# Patient Record
Sex: FEMALE | Race: WHITE | NOT HISPANIC OR LATINO | URBAN - NONMETROPOLITAN AREA
[De-identification: names, ages, dates, MRNs, and addresses within clinical notes are randomized per-mention and may not be internally consistent; named-entity substitution may affect disease eponyms.]

---

## 2017-01-04 ENCOUNTER — OFFICE VISIT (OUTPATIENT)
Dept: FAMILY MEDICINE CLINIC | Facility: CLINIC | Age: 57
End: 2017-01-04

## 2017-01-04 VITALS
BODY MASS INDEX: 36.02 KG/M2 | HEART RATE: 76 BPM | HEIGHT: 64 IN | WEIGHT: 211 LBS | DIASTOLIC BLOOD PRESSURE: 75 MMHG | OXYGEN SATURATION: 97 % | TEMPERATURE: 99 F | SYSTOLIC BLOOD PRESSURE: 115 MMHG

## 2017-01-04 DIAGNOSIS — M25.552 LEFT HIP PAIN: Primary | ICD-10-CM

## 2017-01-04 PROCEDURE — 99213 OFFICE O/P EST LOW 20 MIN: CPT | Performed by: NURSE PRACTITIONER

## 2017-01-04 PROCEDURE — 96372 THER/PROPH/DIAG INJ SC/IM: CPT | Performed by: NURSE PRACTITIONER

## 2017-01-04 RX ORDER — KETOROLAC TROMETHAMINE 30 MG/ML
60 INJECTION, SOLUTION INTRAMUSCULAR; INTRAVENOUS ONCE
Status: COMPLETED | OUTPATIENT
Start: 2017-01-04 | End: 2017-01-04

## 2017-01-04 RX ORDER — DEXAMETHASONE SODIUM PHOSPHATE 4 MG/ML
8 INJECTION, SOLUTION INTRA-ARTICULAR; INTRALESIONAL; INTRAMUSCULAR; INTRAVENOUS; SOFT TISSUE ONCE
Status: COMPLETED | OUTPATIENT
Start: 2017-01-04 | End: 2017-01-04

## 2017-01-04 RX ADMIN — DEXAMETHASONE SODIUM PHOSPHATE 8 MG: 4 INJECTION, SOLUTION INTRA-ARTICULAR; INTRALESIONAL; INTRAMUSCULAR; INTRAVENOUS; SOFT TISSUE at 12:22

## 2017-01-04 RX ADMIN — KETOROLAC TROMETHAMINE 60 MG: 30 INJECTION, SOLUTION INTRAMUSCULAR; INTRAVENOUS at 12:23

## 2017-01-04 NOTE — PROGRESS NOTES
"Alesia Horne is a 56 y.o. female.     Chief Complaint   Patient presents with   • Hip Pain     left        History of Present Illness     Left hip pain-recurrent in nature but no recent exacerbation.  She did get a joint injection per DEVI Coffman that helped with the discomfort.  She is having problems with weight bearing, prolonged sitting, and lying on there side during the night, and negotiating stairs are becoming difficult.  She is unable to take NSAIDS due to gastric bypass surgery.  She has tolerated Toradol in the past.  She has not had any recent injury.  Past Xray positive for arthritis changes.  She reports she did take Zanaflex last night that did help give her some relief.  Not at goal.     The following portions of the patient's history were reviewed and updated as appropriate: allergies, current medications, past family history, past medical history, past social history, past surgical history and problem list.    Review of Systems   Constitutional: Negative for appetite change and unexpected weight change.   Respiratory: Negative for cough and shortness of breath.    Cardiovascular: Negative for chest pain.   Gastrointestinal:        No changes in usual habits   Genitourinary:        No changes in urinary habits   Musculoskeletal: Positive for arthralgias (left hip.  but reports right shoulder, hands and elbow with generalized achiness). Negative for gait problem.   Neurological: Positive for numbness.   Psychiatric/Behavioral: Positive for sleep disturbance (due to discomfort).   All other systems reviewed and are negative.      Objective     Visit Vitals   • /75 (BP Location: Left arm, Patient Position: Sitting)   • Pulse 76   • Temp 99 °F (37.2 °C) (Tympanic)   • Ht 64\" (162.6 cm)   • Wt 211 lb (95.7 kg)   • SpO2 97%   • BMI 36.22 kg/m2       Physical Exam   Constitutional: She is oriented to person, place, and time. She appears well-developed and well-nourished.   HENT:   Head: " Normocephalic and atraumatic.   Nose: Nose normal.   Mouth/Throat: Oropharynx is clear and moist.   Eyes: Conjunctivae and EOM are normal. Pupils are equal, round, and reactive to light.   Neck: Normal range of motion. Neck supple. No tracheal deviation present. No thyromegaly present.   Cardiovascular: Normal rate, regular rhythm, normal heart sounds and intact distal pulses.    No murmur heard.  Pulmonary/Chest: Effort normal and breath sounds normal. No respiratory distress. She has no wheezes.   Abdominal: Soft. Bowel sounds are normal. There is no tenderness. There is no guarding.   Musculoskeletal: She exhibits no edema.        Left hip: She exhibits decreased range of motion and tenderness.   Lymphadenopathy:     She has no cervical adenopathy.   Neurological: She is alert and oriented to person, place, and time.   Skin: Skin is warm and dry. No rash noted.   Psychiatric: She has a normal mood and affect. Her behavior is normal.   Nursing note and vitals reviewed.      Assessment/Plan     Jennifer was seen today for hip pain.    Diagnoses and all orders for this visit:    Left hip pain  -     diclofenac (FLECTOR) 1.3 % patch patch; Apply 1 patch topically 2 (Two) Times a Day.  -     ketorolac (TORADOL) injection 60 mg; Inject 60 mg into the shoulder, thigh, or buttocks 1 (One) Time.  -     dexamethasone (DECADRON) injection 8 mg; Inject 2 mL into the shoulder, thigh, or buttocks 1 (One) Time.    Injections today for acute pain relief.  Will arrange for large joint injection with DEVI Coffman.    Understands disease processes and need for medications.  Understands reasons for urgent and emergent care.  Patient (& family) verbalized agreement for treatment plan.   No NSAIDS due to surgery.   RTC PRN 3-5 days for worsening or non resolving symptoms

## 2017-01-04 NOTE — PROGRESS NOTES
"Subjective   Jennifer Horne is a 56 y.o. female.     Chief Complaint   Patient presents with   • Hip Pain     left        History of Present Illness     {Common H&P Review Areas:76559}    Review of Systems    Objective     Visit Vitals   • /75 (BP Location: Left arm, Patient Position: Sitting)   • Pulse 76   • Temp 99 °F (37.2 °C) (Tympanic)   • Ht 64\" (162.6 cm)   • Wt 211 lb (95.7 kg)   • SpO2 97%   • BMI 36.22 kg/m2       Physical Exam    Assessment/Plan     {Assess/PlanSmartLinks:89419}           "

## 2017-01-10 ENCOUNTER — OFFICE VISIT (OUTPATIENT)
Dept: FAMILY MEDICINE CLINIC | Facility: CLINIC | Age: 57
End: 2017-01-10

## 2017-01-10 VITALS
WEIGHT: 210 LBS | HEART RATE: 98 BPM | SYSTOLIC BLOOD PRESSURE: 110 MMHG | DIASTOLIC BLOOD PRESSURE: 60 MMHG | TEMPERATURE: 99.6 F | BODY MASS INDEX: 35.85 KG/M2 | HEIGHT: 64 IN | OXYGEN SATURATION: 98 %

## 2017-01-10 DIAGNOSIS — M70.72 HIP BURSITIS, LEFT: Primary | ICD-10-CM

## 2017-01-10 PROCEDURE — 20610 DRAIN/INJ JOINT/BURSA W/O US: CPT | Performed by: PHYSICIAN ASSISTANT

## 2017-01-10 PROCEDURE — 99213 OFFICE O/P EST LOW 20 MIN: CPT | Performed by: PHYSICIAN ASSISTANT

## 2017-01-10 NOTE — MR AVS SNAPSHOT
Jennifer Horne   1/10/2017 1:00 PM   Office Visit    Dept Phone:  [phone]   Encounter #:  [id number]    Provider:  MAIK An   Department:  [facility]                Your Full Care Plan              Your Updated Medication List          This list is accurate as of: 1/10/17  1:44 PM.  Always use your most recent med list.                amoxicillin-clavulanate 875-125 MG per tablet   Commonly known as:  AUGMENTIN   Take 1 tablet by mouth 2 (two) times a day.       diclofenac 1 % gel gel   Commonly known as:  VOLTAREN       diclofenac 1.3 % patch patch   Commonly known as:  FLECTOR   Apply 1 patch topically 2 (Two) Times a Day.       levocetirizine 5 MG tablet   Commonly known as:  XYZAL   Take 1 tablet by mouth every evening.       pantoprazole 40 MG EC tablet   Commonly known as:  PROTONIX   TAKE ONE TABLET BY MOUTH DAILY       tiZANidine 4 MG tablet   Commonly known as:  ZANAFLEX   Take 1 tablet by mouth at night as needed for muscle spasms.               Instructions     None    Patient Instructions History      Upcoming Appointments     Visit Type Date Time Department    OFFICE VISIT 1/10/2017  1:00 PM [facility]    OFFICE VISIT 2/3/2017  9:00 AM [facility]      MyChart Signup     Our records indicate that you have declined [facility] sign[facility]. If you would like to sign up for [facility], please email [email or web address] or call [phone] to obtain an activation code.             Other Info from Your Visit           Your Appointments     Feb 03, 2017  9:00 AM EST   Office Visit with MAIK An   [facility] (--)    [address] [place] [id number] [phone]           Please arrive 10 minutes early, bring a complete list of all medications and bring any previous records or diagnostic testing results.              Allergies     Compazine  "[Prochlorperazine]  Myalgia      Vital Signs     Blood Pressure Pulse Temperature Height Weight Oxygen Saturation    110/60 98 99.6 °F (37.6 °C) (Tympanic) 64\" (162.6 cm) 210 lb (95.3 kg) 98%    Body Mass Index Smoking Status                36.05 kg/m2 Former Smoker            "

## 2017-01-10 NOTE — PROGRESS NOTES
Subjective   Jennifer Horne is a 56 y.o. female.     History of Present Illness     Left hip pain-  She is here today with complaints of left hip pain that began 6 days ago.  Minimal relief with Toradol injection last week.  Pain is described as moderate and achy for the past month.  Worse with going up or down stairs and with getting in or out of a car.    The following portions of the patient's history were reviewed and updated as appropriate: allergies, current medications, past family history, past medical history, past social history, past surgical history and problem list.    Review of Systems   Constitutional: Negative for activity change, appetite change and fever.   HENT: Negative for ear pain, sinus pressure and sore throat.    Eyes: Negative for pain and visual disturbance.   Respiratory: Negative for cough and chest tightness.    Cardiovascular: Negative for chest pain and palpitations.   Gastrointestinal: Negative for abdominal pain, constipation, diarrhea, nausea and vomiting.   Endocrine: Negative for polydipsia and polyuria.   Genitourinary: Negative for dysuria and frequency.   Musculoskeletal: Positive for arthralgias (left hip pain). Negative for back pain and myalgias.   Skin: Negative for color change and rash.   Allergic/Immunologic: Negative for food allergies and immunocompromised state.   Neurological: Negative for dizziness, syncope and headaches.   Hematological: Negative for adenopathy. Does not bruise/bleed easily.   Psychiatric/Behavioral: Negative for hallucinations and suicidal ideas. The patient is not nervous/anxious.        Objective   Physical Exam   Constitutional: She is oriented to person, place, and time. She appears well-developed and well-nourished.   Cardiovascular: Normal rate, regular rhythm and normal heart sounds.    No murmur heard.  Pulmonary/Chest: Effort normal and breath sounds normal. She has no wheezes.   Musculoskeletal: She exhibits tenderness.   Left hip tender  laterally.  Decreased range of joint motion in left hip with abduction flexion and extension.   Neurological: She is alert and oriented to person, place, and time.   Skin: Skin is warm and dry.   Psychiatric: She has a normal mood and affect. Her behavior is normal.   Nursing note and vitals reviewed.      Assessment/Plan   Diagnoses and all orders for this visit:    Hip bursitis, left     procedure: Left hip injection/large joint injection  The procedure risks and benefits were explained to patient she gave verbal consent to have the procedure performed.    Indication: Left hip bursitis  Provider: Aisha Coffman PA-C  Description: The patient was placed on her right side.  An injection was given into the left hip joint using 2 cc of 1% lidocaine and 1 cc of Kenalog.  Pressure was held and a sterile dressing was placed.  No complications  Estimated blood loss: Minimal  Patient tolerated the procedure well.    1% lidocaine 200 mg per 20 mL NDC #0409-427 6-1 6  Kenalog 400 mg per 10 mL NDC #0003-029 3-2 8

## 2017-02-03 ENCOUNTER — OFFICE VISIT (OUTPATIENT)
Dept: FAMILY MEDICINE CLINIC | Facility: CLINIC | Age: 57
End: 2017-02-03

## 2017-02-03 VITALS
BODY MASS INDEX: 35.68 KG/M2 | OXYGEN SATURATION: 98 % | DIASTOLIC BLOOD PRESSURE: 82 MMHG | WEIGHT: 209 LBS | SYSTOLIC BLOOD PRESSURE: 138 MMHG | TEMPERATURE: 98.4 F | HEART RATE: 74 BPM | HEIGHT: 64 IN

## 2017-02-03 DIAGNOSIS — E55.9 VITAMIN D DEFICIENCY: ICD-10-CM

## 2017-02-03 DIAGNOSIS — M15.9 PRIMARY OSTEOARTHRITIS INVOLVING MULTIPLE JOINTS: Primary | ICD-10-CM

## 2017-02-03 DIAGNOSIS — K21.9 GERD WITHOUT ESOPHAGITIS: ICD-10-CM

## 2017-02-03 DIAGNOSIS — J30.1 NON-SEASONAL ALLERGIC RHINITIS DUE TO POLLEN: ICD-10-CM

## 2017-02-03 DIAGNOSIS — Z11.59 NEED FOR HEPATITIS C SCREENING TEST: ICD-10-CM

## 2017-02-03 PROCEDURE — 99214 OFFICE O/P EST MOD 30 MIN: CPT | Performed by: PHYSICIAN ASSISTANT

## 2017-02-03 PROCEDURE — 90715 TDAP VACCINE 7 YRS/> IM: CPT | Performed by: PHYSICIAN ASSISTANT

## 2017-02-03 PROCEDURE — 90471 IMMUNIZATION ADMIN: CPT | Performed by: PHYSICIAN ASSISTANT

## 2017-02-03 RX ORDER — PANTOPRAZOLE SODIUM 40 MG/1
40 TABLET, DELAYED RELEASE ORAL DAILY
Qty: 90 TABLET | Refills: 3 | Status: SHIPPED | OUTPATIENT
Start: 2017-02-03 | End: 2017-08-18 | Stop reason: SDUPTHER

## 2017-02-03 NOTE — PROGRESS NOTES
Subjective   Jennifer Horne is a 57 y.o. female.     History of Present Illness     Left hip pain-  She complains of intermittent moderate achy left hip pain that is worse when going up and down stairs.  Some relief with Kenalog injection in the recent past.  Minimal relief with Voltaren gel.  Onset was over a year ago.  No known injury.  X-rays have confirmed osteoarthritis in the left hip in 2016.  Not at goal    GERD-stable with the use of pantoprazole    Allergic rhinitis-chronic and stable with his eyes all daily    The following portions of the patient's history were reviewed and updated as appropriate: allergies, current medications, past family history, past medical history, past social history, past surgical history and problem list.    Review of Systems   Constitutional: Negative for activity change, appetite change and fever.   HENT: Negative for ear pain, sinus pressure and sore throat.    Eyes: Negative for pain and visual disturbance.   Respiratory: Negative for cough and chest tightness.    Cardiovascular: Negative for chest pain and palpitations.   Gastrointestinal: Negative for abdominal pain, constipation, diarrhea, nausea and vomiting.   Endocrine: Negative for polydipsia and polyuria.   Genitourinary: Negative for dysuria and frequency.   Musculoskeletal: Positive for arthralgias. Negative for back pain and myalgias.   Skin: Negative for color change and rash.   Allergic/Immunologic: Negative for food allergies and immunocompromised state.   Neurological: Negative for dizziness, syncope and headaches.   Hematological: Negative for adenopathy. Does not bruise/bleed easily.   Psychiatric/Behavioral: Negative for hallucinations and suicidal ideas. The patient is not nervous/anxious.        Objective   Physical Exam   Constitutional: She is oriented to person, place, and time. She appears well-developed and well-nourished.   HENT:   Head: Normocephalic and atraumatic.   Nose: Nose normal.    Mouth/Throat: Oropharynx is clear and moist.   Eyes: Conjunctivae and EOM are normal. Pupils are equal, round, and reactive to light.   Neck: Normal range of motion. Neck supple. No tracheal deviation present. No thyromegaly present.   Cardiovascular: Normal rate, regular rhythm, normal heart sounds and intact distal pulses.    No murmur heard.  Pulmonary/Chest: Effort normal and breath sounds normal. No respiratory distress. She has no wheezes.   Abdominal: Soft. Bowel sounds are normal. There is no tenderness. There is no guarding.   Musculoskeletal: Normal range of motion. She exhibits no edema or tenderness.   Lymphadenopathy:     She has no cervical adenopathy.   Neurological: She is alert and oriented to person, place, and time.   Skin: Skin is warm and dry. No rash noted.   Psychiatric: She has a normal mood and affect. Her behavior is normal.   Nursing note and vitals reviewed.      Assessment/Plan   Diagnoses and all orders for this visit:    Primary osteoarthritis involving multiple joints  -     Comprehensive Metabolic Panel  -     Lipid Panel    GERD without esophagitis  -     pantoprazole (PROTONIX) 40 MG EC tablet; Take 1 tablet by mouth Daily.  -     CBC & Differential    Non-seasonal allergic rhinitis due to pollen    Need for hepatitis C screening test  -     Hepatitis C Antibody; Future    Vitamin D deficiency  -     Vitamin D 25 Hydroxy    Other orders  -     Tdap Vaccine Greater Than or Equal To 8yo IM     prescription was written for compounded pain therapy including Nabumetone, gabapentin, lidocaine, and prilocaine.

## 2017-05-30 ENCOUNTER — HOSPITAL ENCOUNTER (OUTPATIENT)
Dept: GENERAL RADIOLOGY | Facility: HOSPITAL | Age: 57
Discharge: HOME | End: 2017-05-30
Admitting: PHYSICIAN ASSISTANT

## 2017-05-30 DIAGNOSIS — M79.672 LEFT FOOT PAIN: Primary | ICD-10-CM

## 2017-05-30 DIAGNOSIS — M79.672 LEFT FOOT PAIN: ICD-10-CM

## 2017-05-30 PROCEDURE — 73630 X-RAY EXAM OF FOOT: CPT

## 2017-05-30 PROCEDURE — 73630 X-RAY EXAM OF FOOT: CPT | Performed by: RADIOLOGY

## 2017-05-31 ENCOUNTER — TELEPHONE (OUTPATIENT)
Dept: FAMILY MEDICINE CLINIC | Facility: CLINIC | Age: 57
End: 2017-05-31

## 2017-07-28 ENCOUNTER — LAB (OUTPATIENT)
Dept: FAMILY MEDICINE CLINIC | Facility: CLINIC | Age: 57
End: 2017-07-28

## 2017-07-28 DIAGNOSIS — Z11.59 NEED FOR HEPATITIS C SCREENING TEST: ICD-10-CM

## 2017-07-28 LAB
25(OH)D3 SERPL-MCNC: 45 NG/ML
ALBUMIN SERPL-MCNC: 4.8 G/DL (ref 3.5–5)
ALBUMIN/GLOB SERPL: 1.6 G/DL (ref 1.5–2.5)
ALP SERPL-CCNC: 98 U/L (ref 35–104)
ALT SERPL W P-5'-P-CCNC: 25 U/L (ref 10–36)
ANION GAP SERPL CALCULATED.3IONS-SCNC: 5.5 MMOL/L (ref 3.6–11.2)
AST SERPL-CCNC: 29 U/L (ref 10–30)
BASOPHILS # BLD AUTO: 0.02 10*3/MM3 (ref 0–0.3)
BASOPHILS NFR BLD AUTO: 0.3 % (ref 0–2)
BILIRUB SERPL-MCNC: 0.5 MG/DL (ref 0.2–1.8)
BUN BLD-MCNC: 10 MG/DL (ref 7–21)
BUN/CREAT SERPL: 14.7 (ref 7–25)
CALCIUM SPEC-SCNC: 9.8 MG/DL (ref 7.7–10)
CHLORIDE SERPL-SCNC: 107 MMOL/L (ref 99–112)
CHOLEST SERPL-MCNC: 188 MG/DL (ref 0–200)
CO2 SERPL-SCNC: 32.5 MMOL/L (ref 24.3–31.9)
CREAT BLD-MCNC: 0.68 MG/DL (ref 0.43–1.29)
DEPRECATED RDW RBC AUTO: 41.9 FL (ref 37–54)
EOSINOPHIL # BLD AUTO: 0.12 10*3/MM3 (ref 0–0.7)
EOSINOPHIL NFR BLD AUTO: 2.1 % (ref 0–5)
ERYTHROCYTE [DISTWIDTH] IN BLOOD BY AUTOMATED COUNT: 13.6 % (ref 11.5–14.5)
GFR SERPL CREATININE-BSD FRML MDRD: 90 ML/MIN/1.73
GLOBULIN UR ELPH-MCNC: 3 GM/DL
GLUCOSE BLD-MCNC: 98 MG/DL (ref 70–110)
HCT VFR BLD AUTO: 41.3 % (ref 37–47)
HCV AB SER DONR QL: NORMAL
HDLC SERPL-MCNC: 62 MG/DL (ref 60–100)
HGB BLD-MCNC: 13.1 G/DL (ref 12–16)
IMM GRANULOCYTES # BLD: 0.01 10*3/MM3 (ref 0–0.03)
IMM GRANULOCYTES NFR BLD: 0.2 % (ref 0–0.5)
LDLC SERPL CALC-MCNC: 109 MG/DL (ref 0–100)
LDLC/HDLC SERPL: 1.75 {RATIO}
LYMPHOCYTES # BLD AUTO: 1.43 10*3/MM3 (ref 1–3)
LYMPHOCYTES NFR BLD AUTO: 24.4 % (ref 21–51)
MCH RBC QN AUTO: 27.6 PG (ref 27–33)
MCHC RBC AUTO-ENTMCNC: 31.7 G/DL (ref 33–37)
MCV RBC AUTO: 86.9 FL (ref 80–94)
MONOCYTES # BLD AUTO: 0.48 10*3/MM3 (ref 0.1–0.9)
MONOCYTES NFR BLD AUTO: 8.2 % (ref 0–10)
NEUTROPHILS # BLD AUTO: 3.79 10*3/MM3 (ref 1.4–6.5)
NEUTROPHILS NFR BLD AUTO: 64.8 % (ref 30–70)
OSMOLALITY SERPL CALC.SUM OF ELEC: 287.7 MOSM/KG (ref 273–305)
PLATELET # BLD AUTO: 234 10*3/MM3 (ref 130–400)
PMV BLD AUTO: 11.2 FL (ref 6–10)
POTASSIUM BLD-SCNC: 4.2 MMOL/L (ref 3.5–5.3)
PROT SERPL-MCNC: 7.8 G/DL (ref 6–8)
RBC # BLD AUTO: 4.75 10*6/MM3 (ref 4.2–5.4)
SODIUM BLD-SCNC: 145 MMOL/L (ref 135–153)
TRIGL SERPL-MCNC: 87 MG/DL (ref 0–150)
VLDLC SERPL-MCNC: 17.4 MG/DL
WBC NRBC COR # BLD: 5.85 10*3/MM3 (ref 4.5–12.5)

## 2017-07-28 PROCEDURE — 80053 COMPREHEN METABOLIC PANEL: CPT | Performed by: PHYSICIAN ASSISTANT

## 2017-07-28 PROCEDURE — 82306 VITAMIN D 25 HYDROXY: CPT | Performed by: PHYSICIAN ASSISTANT

## 2017-07-28 PROCEDURE — 86803 HEPATITIS C AB TEST: CPT | Performed by: PHYSICIAN ASSISTANT

## 2017-07-28 PROCEDURE — 80061 LIPID PANEL: CPT | Performed by: PHYSICIAN ASSISTANT

## 2017-07-28 PROCEDURE — 36415 COLL VENOUS BLD VENIPUNCTURE: CPT

## 2017-07-28 PROCEDURE — 85025 COMPLETE CBC W/AUTO DIFF WBC: CPT | Performed by: PHYSICIAN ASSISTANT

## 2017-08-18 ENCOUNTER — OFFICE VISIT (OUTPATIENT)
Dept: FAMILY MEDICINE CLINIC | Facility: CLINIC | Age: 57
End: 2017-08-18

## 2017-08-18 VITALS
DIASTOLIC BLOOD PRESSURE: 78 MMHG | HEIGHT: 64 IN | WEIGHT: 213 LBS | SYSTOLIC BLOOD PRESSURE: 128 MMHG | HEART RATE: 80 BPM | BODY MASS INDEX: 36.37 KG/M2 | OXYGEN SATURATION: 97 % | TEMPERATURE: 98.2 F

## 2017-08-18 DIAGNOSIS — M15.9 PRIMARY OSTEOARTHRITIS INVOLVING MULTIPLE JOINTS: Primary | ICD-10-CM

## 2017-08-18 DIAGNOSIS — M62.838 MUSCLE SPASM OF BOTH LOWER LEGS: ICD-10-CM

## 2017-08-18 DIAGNOSIS — J30.1 NON-SEASONAL ALLERGIC RHINITIS DUE TO POLLEN: ICD-10-CM

## 2017-08-18 DIAGNOSIS — K21.9 GERD WITHOUT ESOPHAGITIS: ICD-10-CM

## 2017-08-18 PROCEDURE — 99214 OFFICE O/P EST MOD 30 MIN: CPT | Performed by: PHYSICIAN ASSISTANT

## 2017-08-18 RX ORDER — PANTOPRAZOLE SODIUM 40 MG/1
40 TABLET, DELAYED RELEASE ORAL DAILY
Qty: 90 TABLET | Refills: 3 | Status: SHIPPED | OUTPATIENT
Start: 2017-08-18 | End: 2018-02-16 | Stop reason: SDUPTHER

## 2017-08-18 RX ORDER — LEVOCETIRIZINE DIHYDROCHLORIDE 5 MG/1
5 TABLET, FILM COATED ORAL EVERY EVENING
Qty: 90 TABLET | Refills: 3 | Status: SHIPPED | OUTPATIENT
Start: 2017-08-18 | End: 2018-02-16 | Stop reason: SDUPTHER

## 2017-08-18 RX ORDER — TIZANIDINE 4 MG/1
4 TABLET ORAL NIGHTLY PRN
Qty: 90 TABLET | Refills: 3 | Status: SHIPPED | OUTPATIENT
Start: 2017-08-18 | End: 2018-02-16 | Stop reason: SDUPTHER

## 2017-08-23 NOTE — PROGRESS NOTES
Subjective   Jennifer Horne is a 57 y.o. female.     History of Present Illness     Allergic rhinitis-  She complains of runny nose and allergies.  Stable with Xyzal    GERD - stable with protonix    Muscle spasm- She complains of bilateral leg moderate throbbing with associated muscle spasms. Onsets > 1 month. Improved with zanaflex qhs.    OA- joint pain stable with otc tylenol prn.    The following portions of the patient's history were reviewed and updated as appropriate: allergies, current medications, past family history, past medical history, past social history, past surgical history and problem list.    Review of Systems   Constitutional: Negative for activity change, appetite change and fever.   HENT: Negative for ear pain, sinus pressure and sore throat.    Eyes: Negative for pain and visual disturbance.   Respiratory: Negative for cough and chest tightness.    Cardiovascular: Negative for chest pain and palpitations.   Gastrointestinal: Negative for abdominal pain, constipation, diarrhea, nausea and vomiting.   Endocrine: Negative for polydipsia and polyuria.   Genitourinary: Negative for dysuria and frequency.   Musculoskeletal: Negative for back pain and myalgias.   Skin: Negative for color change and rash.   Allergic/Immunologic: Negative for food allergies and immunocompromised state.   Neurological: Negative for dizziness, syncope and headaches.   Hematological: Negative for adenopathy. Does not bruise/bleed easily.   Psychiatric/Behavioral: Negative for hallucinations and suicidal ideas. The patient is not nervous/anxious.         Objective   Physical Exam   Constitutional: She is oriented to person, place, and time. She appears well-developed and well-nourished.   HENT:   Head: Normocephalic and atraumatic.   Nose: Nose normal.   Mouth/Throat: Oropharynx is clear and moist.   Eyes: Conjunctivae and EOM are normal. Pupils are equal, round, and reactive to light.   Neck: Normal range of motion. Neck  supple. No tracheal deviation present. No thyromegaly present.   Cardiovascular: Normal rate, regular rhythm, normal heart sounds and intact distal pulses.    No murmur heard.  Pulmonary/Chest: Effort normal and breath sounds normal. No respiratory distress. She has no wheezes.   Abdominal: Soft. Bowel sounds are normal. There is no tenderness. There is no guarding.   Musculoskeletal: Normal range of motion. She exhibits no edema or tenderness.   Lymphadenopathy:     She has no cervical adenopathy.   Neurological: She is alert and oriented to person, place, and time.   Skin: Skin is warm and dry. No rash noted.   Psychiatric: She has a normal mood and affect. Her behavior is normal.   Nursing note and vitals reviewed.      Assessment/Plan   Diagnoses and all orders for this visit:    Primary osteoarthritis involving multiple joints    Non-seasonal allergic rhinitis due to pollen  -     levocetirizine (XYZAL) 5 MG tablet; Take 1 tablet by mouth Every Evening.    GERD without esophagitis  -     pantoprazole (PROTONIX) 40 MG EC tablet; Take 1 tablet by mouth Daily.    Muscle spasm of both lower legs  -     tiZANidine (ZANAFLEX) 4 MG tablet; Take 1 tablet by mouth At Night As Needed for Muscle Spasms.

## 2017-11-13 ENCOUNTER — OFFICE VISIT (OUTPATIENT)
Dept: FAMILY MEDICINE CLINIC | Facility: CLINIC | Age: 57
End: 2017-11-13

## 2017-11-13 VITALS
DIASTOLIC BLOOD PRESSURE: 78 MMHG | TEMPERATURE: 98.4 F | HEART RATE: 85 BPM | BODY MASS INDEX: 36.7 KG/M2 | SYSTOLIC BLOOD PRESSURE: 126 MMHG | WEIGHT: 215 LBS | HEIGHT: 64 IN | OXYGEN SATURATION: 96 %

## 2017-11-13 DIAGNOSIS — J01.10 ACUTE NON-RECURRENT FRONTAL SINUSITIS: Primary | ICD-10-CM

## 2017-11-13 PROCEDURE — 99213 OFFICE O/P EST LOW 20 MIN: CPT | Performed by: PHYSICIAN ASSISTANT

## 2017-11-13 RX ORDER — FLUCONAZOLE 150 MG/1
150 TABLET ORAL ONCE
Qty: 1 TABLET | Refills: 0 | Status: SHIPPED | OUTPATIENT
Start: 2017-11-13 | End: 2017-11-13

## 2017-11-13 RX ORDER — LEVOFLOXACIN 500 MG/1
500 TABLET, FILM COATED ORAL DAILY
Qty: 10 TABLET | Refills: 0 | Status: SHIPPED | OUTPATIENT
Start: 2017-11-13 | End: 2017-11-23

## 2017-11-13 NOTE — PROGRESS NOTES
Subjective   Jennifer Horne is a 57 y.o. female.     History of Present Illness     Acute illness-  She is here today with complaints of headache, bilateral frontal sinus tenderness, dry cough and fatigue for the past week.  Minimal relief with Flonase or DayQuil.    The following portions of the patient's history were reviewed and updated as appropriate: allergies, current medications, past family history, past medical history, past social history, past surgical history and problem list.    Review of Systems   Constitutional: Negative for activity change, appetite change and fever.   HENT: Positive for congestion, sinus pain and sinus pressure. Negative for ear pain and sore throat.    Eyes: Negative for pain and visual disturbance.   Respiratory: Positive for cough. Negative for chest tightness.    Cardiovascular: Negative for chest pain and palpitations.   Gastrointestinal: Negative for abdominal pain, constipation, diarrhea, nausea and vomiting.   Endocrine: Negative for polydipsia and polyuria.   Genitourinary: Negative for dysuria and frequency.   Musculoskeletal: Negative for back pain and myalgias.   Skin: Negative for color change and rash.   Allergic/Immunologic: Negative for food allergies and immunocompromised state.   Neurological: Positive for headaches. Negative for dizziness and syncope.   Hematological: Negative for adenopathy. Does not bruise/bleed easily.   Psychiatric/Behavioral: Negative for hallucinations and suicidal ideas. The patient is not nervous/anxious.        Objective   Physical Exam   Constitutional: She is oriented to person, place, and time. She appears well-developed and well-nourished. No distress.   HENT:   Head: Normocephalic and atraumatic.   Right Ear: Tympanic membrane, external ear and ear canal normal.   Left Ear: Tympanic membrane, external ear and ear canal normal.   Mouth/Throat: Oropharynx is clear and moist.   Frontal sinus tenderness to palpation noted   Neck: Normal  range of motion. Neck supple. No thyromegaly present.   Cardiovascular: Normal rate and regular rhythm.    No murmur heard.  Pulmonary/Chest: Effort normal. She has no wheezes. She has no rales.   Bronchovesicular breath sounds noted bilaterally   Neurological: She is alert and oriented to person, place, and time.   Skin: Skin is warm and dry.   Psychiatric: She has a normal mood and affect. Her behavior is normal.       Assessment/Plan   Diagnoses and all orders for this visit:    Acute non-recurrent frontal sinusitis  -     levoFLOXacin (LEVAQUIN) 500 MG tablet; Take 1 tablet by mouth Daily for 10 days.    Other orders  -     fluconazole (DIFLUCAN) 150 MG tablet; Take 1 tablet by mouth 1 (One) Time for 1 dose.

## 2018-02-16 ENCOUNTER — OFFICE VISIT (OUTPATIENT)
Dept: FAMILY MEDICINE CLINIC | Facility: CLINIC | Age: 58
End: 2018-02-16

## 2018-02-16 VITALS
HEIGHT: 64 IN | HEART RATE: 78 BPM | BODY MASS INDEX: 36.37 KG/M2 | SYSTOLIC BLOOD PRESSURE: 140 MMHG | OXYGEN SATURATION: 96 % | WEIGHT: 213 LBS | DIASTOLIC BLOOD PRESSURE: 80 MMHG | TEMPERATURE: 98.5 F

## 2018-02-16 DIAGNOSIS — E55.9 VITAMIN D DEFICIENCY: ICD-10-CM

## 2018-02-16 DIAGNOSIS — J30.89 CHRONIC NONSEASONAL ALLERGIC RHINITIS DUE TO POLLEN: ICD-10-CM

## 2018-02-16 DIAGNOSIS — R73.9 HYPERGLYCEMIA: ICD-10-CM

## 2018-02-16 DIAGNOSIS — Z12.31 ENCOUNTER FOR SCREENING MAMMOGRAM FOR BREAST CANCER: ICD-10-CM

## 2018-02-16 DIAGNOSIS — K21.9 GERD WITHOUT ESOPHAGITIS: Primary | ICD-10-CM

## 2018-02-16 DIAGNOSIS — Z13.220 SCREENING FOR HYPERLIPIDEMIA: ICD-10-CM

## 2018-02-16 DIAGNOSIS — M62.838 MUSCLE SPASM OF BOTH LOWER LEGS: ICD-10-CM

## 2018-02-16 PROCEDURE — 99214 OFFICE O/P EST MOD 30 MIN: CPT | Performed by: PHYSICIAN ASSISTANT

## 2018-02-16 RX ORDER — LEVOCETIRIZINE DIHYDROCHLORIDE 5 MG/1
5 TABLET, FILM COATED ORAL EVERY EVENING
Qty: 90 TABLET | Refills: 3 | Status: SHIPPED | OUTPATIENT
Start: 2018-02-16 | End: 2018-08-17 | Stop reason: SDUPTHER

## 2018-02-16 RX ORDER — TIZANIDINE 4 MG/1
4 TABLET ORAL NIGHTLY PRN
Qty: 90 TABLET | Refills: 3 | Status: SHIPPED | OUTPATIENT
Start: 2018-02-16 | End: 2018-08-17 | Stop reason: SDUPTHER

## 2018-02-16 RX ORDER — PANTOPRAZOLE SODIUM 40 MG/1
40 TABLET, DELAYED RELEASE ORAL DAILY
Qty: 90 TABLET | Refills: 3 | Status: SHIPPED | OUTPATIENT
Start: 2018-02-16 | End: 2018-08-17 | Stop reason: SDUPTHER

## 2018-02-17 NOTE — PROGRESS NOTES
"Subjective   Jennifer Horne is a 58 y.o. female.     Chief complaint-acid reflux disease    History of Present Illness     Vanda soft reflux disease-  She's here today to follow-up on chronic issues including GERD.  Stable with pantoprazole    Chronic allergic rhinitis-stable with Xyzal    Muscle spasms in lower legs-stable with use of Zanaflex when necessary    Vitamin D deficiency-stable with vitamin D supplementation    The following portions of the patient's history were reviewed and updated as appropriate: allergies, current medications, past family history, past medical history, past social history, past surgical history and problem list.    Review of Systems   Constitutional: Negative for activity change, appetite change and fever.   HENT: Negative for ear pain, sinus pressure and sore throat.    Eyes: Negative for pain and visual disturbance.   Respiratory: Negative for cough and chest tightness.    Cardiovascular: Negative for chest pain and palpitations.   Gastrointestinal: Negative for abdominal pain, constipation, diarrhea, nausea and vomiting.   Endocrine: Negative for polydipsia and polyuria.   Genitourinary: Negative for dysuria and frequency.   Musculoskeletal: Negative for back pain and myalgias.   Skin: Negative for color change and rash.   Allergic/Immunologic: Negative for food allergies and immunocompromised state.   Neurological: Negative for dizziness, syncope and headaches.   Hematological: Negative for adenopathy. Does not bruise/bleed easily.   Psychiatric/Behavioral: Negative for hallucinations and suicidal ideas. The patient is not nervous/anxious.        /80  Pulse 78  Temp 98.5 °F (36.9 °C) (Oral)   Ht 162.6 cm (64.02\")  Wt 96.6 kg (213 lb)  SpO2 96%  BMI 36.54 kg/m2  Lab on 07/28/2017   Component Date Value Ref Range Status   • Hepatitis C Ab 07/28/2017 Non-Reactive  Non-Reactive Final       Physical Exam   Constitutional: She is oriented to person, place, and time. She " appears well-developed and well-nourished.   HENT:   Head: Normocephalic and atraumatic.   Nose: Nose normal.   Mouth/Throat: Oropharynx is clear and moist.   Eyes: Conjunctivae and EOM are normal. Pupils are equal, round, and reactive to light.   Neck: Normal range of motion. Neck supple. No tracheal deviation present. No thyromegaly present.   Cardiovascular: Normal rate, regular rhythm, normal heart sounds and intact distal pulses.    No murmur heard.  Pulmonary/Chest: Effort normal and breath sounds normal. No respiratory distress. She has no wheezes.   Abdominal: Soft. Bowel sounds are normal. There is no tenderness. There is no guarding.   Musculoskeletal: Normal range of motion. She exhibits no edema or tenderness.   Lymphadenopathy:     She has no cervical adenopathy.   Neurological: She is alert and oriented to person, place, and time.   Skin: Skin is warm and dry. No rash noted.   Psychiatric: She has a normal mood and affect. Her behavior is normal.   Nursing note and vitals reviewed.      Assessment/Plan     Diagnoses and all orders for this visit:    GERD without esophagitis  -     pantoprazole (PROTONIX) 40 MG EC tablet; Take 1 tablet by mouth Daily.  -     Comprehensive Metabolic Panel; Future    Chronic nonseasonal allergic rhinitis due to pollen  -     levocetirizine (XYZAL) 5 MG tablet; Take 1 tablet by mouth Every Evening.    Muscle spasm of both lower legs  -     tiZANidine (ZANAFLEX) 4 MG tablet; Take 1 tablet by mouth At Night As Needed for Muscle Spasms.    Encounter for screening mammogram for breast cancer  -     Mammo Screening Digital Tomosynthesis Bilateral With CAD; Future    Vitamin D deficiency  -     Vitamin D 25 Hydroxy; Future    Hyperglycemia  -     Hemoglobin A1c; Future    Screening for hyperlipidemia  -     Lipid Panel; Future                 This document has been electronically signed by:  Aisha Coffman PA-C

## 2018-03-19 ENCOUNTER — APPOINTMENT (OUTPATIENT)
Dept: MAMMOGRAPHY | Facility: HOSPITAL | Age: 58
End: 2018-03-19

## 2018-04-11 ENCOUNTER — HOSPITAL ENCOUNTER (OUTPATIENT)
Dept: MAMMOGRAPHY | Facility: HOSPITAL | Age: 58
Discharge: HOME | End: 2018-04-11
Admitting: PHYSICIAN ASSISTANT

## 2018-04-11 DIAGNOSIS — Z12.31 ENCOUNTER FOR SCREENING MAMMOGRAM FOR BREAST CANCER: ICD-10-CM

## 2018-04-11 PROCEDURE — 77067 SCR MAMMO BI INCL CAD: CPT

## 2018-04-11 PROCEDURE — 77063 BREAST TOMOSYNTHESIS BI: CPT | Performed by: RADIOLOGY

## 2018-04-11 PROCEDURE — 77067 SCR MAMMO BI INCL CAD: CPT | Performed by: RADIOLOGY

## 2018-04-11 PROCEDURE — 77063 BREAST TOMOSYNTHESIS BI: CPT

## 2018-04-23 ENCOUNTER — TELEPHONE (OUTPATIENT)
Dept: FAMILY MEDICINE CLINIC | Facility: CLINIC | Age: 58
End: 2018-04-23

## 2018-04-23 ENCOUNTER — OFFICE VISIT (OUTPATIENT)
Dept: FAMILY MEDICINE CLINIC | Facility: CLINIC | Age: 58
End: 2018-04-23

## 2018-04-23 VITALS
BODY MASS INDEX: 36.19 KG/M2 | HEIGHT: 64 IN | TEMPERATURE: 98.2 F | HEART RATE: 77 BPM | DIASTOLIC BLOOD PRESSURE: 60 MMHG | WEIGHT: 212 LBS | SYSTOLIC BLOOD PRESSURE: 116 MMHG | OXYGEN SATURATION: 97 %

## 2018-04-23 DIAGNOSIS — E66.09 CLASS 2 OBESITY DUE TO EXCESS CALORIES WITHOUT SERIOUS COMORBIDITY WITH BODY MASS INDEX (BMI) OF 36.0 TO 36.9 IN ADULT: Primary | ICD-10-CM

## 2018-04-23 PROCEDURE — 99214 OFFICE O/P EST MOD 30 MIN: CPT | Performed by: PHYSICIAN ASSISTANT

## 2018-04-23 NOTE — TELEPHONE ENCOUNTER
---I called adriana informed her mammogram was normal      -- Message from MAIK An sent at 4/20/2018  5:16 PM EDT -----  Informed patient that her mammogram was benign

## 2018-04-24 NOTE — PROGRESS NOTES
"Subjective   Jennifer Horne is a 58 y.o. female.     Chief complaint-obesity    History of Present Illness     Obesity-  She is here today to follow-up on her medically managed weight loss program.  She has lost 1 pound in the past month with the use of Contrave.  She reports that she just started the maintenance dose within the past week.  She does report side effect of constipation and heartburn.  Some relief with MiraLAX.  She states that she has been eating healthier food options and has noticed in the past week that her food portions have decreased.  Not at goal    The following portions of the patient's history were reviewed and updated as appropriate: allergies, current medications, past family history, past medical history, past social history, past surgical history and problem list.    Review of Systems   Constitutional: Positive for activity change and appetite change. Negative for fever.   HENT: Negative for ear pain, sinus pressure and sore throat.    Eyes: Negative for pain and visual disturbance.   Respiratory: Negative for cough and chest tightness.    Cardiovascular: Negative for chest pain and palpitations.   Gastrointestinal: Negative for abdominal pain, constipation, diarrhea, nausea and vomiting.   Endocrine: Negative for polydipsia and polyuria.   Genitourinary: Negative for dysuria and frequency.   Musculoskeletal: Negative for back pain and myalgias.   Skin: Negative for color change and rash.   Allergic/Immunologic: Negative for food allergies and immunocompromised state.   Neurological: Negative for dizziness, syncope and headaches.   Hematological: Negative for adenopathy. Does not bruise/bleed easily.   Psychiatric/Behavioral: Negative for hallucinations and suicidal ideas. The patient is not nervous/anxious.        /60   Pulse 77   Temp 98.2 °F (36.8 °C) (Oral)   Ht 162.6 cm (64.02\")   Wt 96.2 kg (212 lb)   SpO2 97%   BMI 36.37 kg/m²     Physical Exam   Constitutional: She is " oriented to person, place, and time. She appears well-developed and well-nourished.   Cardiovascular: Normal rate, regular rhythm and normal heart sounds.    No murmur heard.  Pulmonary/Chest: Effort normal and breath sounds normal. She has no wheezes.   Abdominal: Soft. She exhibits distension (slight). There is no tenderness.   Neurological: She is alert and oriented to person, place, and time.   Skin: Skin is warm. No rash noted. No erythema.   Psychiatric: She has a normal mood and affect. Her behavior is normal.   Nursing note and vitals reviewed.      Assessment/Plan     Diagnoses and all orders for this visit:    Class 2 obesity due to excess calories without serious comorbidity with body mass index (BMI) of 36.0 to 36.9 in adult  -     naltrexone-bupropion ER (CONTRAVE) 8-90 MG tablet; Take 2 tablets by mouth Every 12 (Twelve) Hours.    Constipation-  She was advised to take MiraLAX and Colace as needed for constipation    15 minutes of total face to face 25 minute office visit spent counseling patient on diet and exercise as well as the use of Contrave.             This document has been electronically signed by:  Aisha Coffman PA-C

## 2018-05-11 ENCOUNTER — OFFICE VISIT (OUTPATIENT)
Dept: FAMILY MEDICINE CLINIC | Facility: CLINIC | Age: 58
End: 2018-05-11

## 2018-05-11 VITALS
OXYGEN SATURATION: 98 % | WEIGHT: 210 LBS | HEART RATE: 87 BPM | HEIGHT: 64 IN | BODY MASS INDEX: 35.85 KG/M2 | DIASTOLIC BLOOD PRESSURE: 70 MMHG | TEMPERATURE: 98.3 F | SYSTOLIC BLOOD PRESSURE: 120 MMHG

## 2018-05-11 DIAGNOSIS — E66.09 CLASS 2 OBESITY DUE TO EXCESS CALORIES WITHOUT SERIOUS COMORBIDITY WITH BODY MASS INDEX (BMI) OF 36.0 TO 36.9 IN ADULT: Primary | ICD-10-CM

## 2018-05-11 PROCEDURE — 99213 OFFICE O/P EST LOW 20 MIN: CPT | Performed by: PHYSICIAN ASSISTANT

## 2018-05-11 NOTE — PROGRESS NOTES
"Subjective   Jennifer Horne is a 58 y.o. female.     Chief complaint-obesity    History of Present Illness     Obesity-  She is here today to follow-up on her medically managed weight loss program.  She has lost 2 more pounds in the past month with the use of Contrave.  She states that she has been eating healthier food options and smaller food portions.  Not at goal    The following portions of the patient's history were reviewed and updated as appropriate: allergies, current medications, past family history, past medical history, past social history, past surgical history and problem list.    Review of Systems   Constitutional: Positive for activity change and appetite change. Negative for fever.   HENT: Negative for ear pain, sinus pressure and sore throat.    Eyes: Negative for pain and visual disturbance.   Respiratory: Negative for cough and chest tightness.    Cardiovascular: Negative for chest pain and palpitations.   Gastrointestinal: Negative for abdominal pain, constipation, diarrhea, nausea and vomiting.   Endocrine: Negative for polydipsia and polyuria.   Genitourinary: Negative for dysuria and frequency.   Musculoskeletal: Negative for back pain and myalgias.   Skin: Negative for color change and rash.   Allergic/Immunologic: Negative for food allergies and immunocompromised state.   Neurological: Negative for dizziness, syncope and headaches.   Hematological: Negative for adenopathy. Does not bruise/bleed easily.   Psychiatric/Behavioral: Negative for hallucinations and suicidal ideas. The patient is not nervous/anxious.        /70   Pulse 87   Temp 98.3 °F (36.8 °C) (Oral)   Ht 162.6 cm (64.02\")   Wt 95.3 kg (210 lb)   SpO2 98%   BMI 36.03 kg/m²     Physical Exam   Constitutional: She is oriented to person, place, and time. She appears well-developed and well-nourished.   Cardiovascular: Normal rate, regular rhythm and normal heart sounds.    No murmur heard.  Pulmonary/Chest: Effort " normal and breath sounds normal. She has no wheezes.   Abdominal: Soft. She exhibits no distension. There is no tenderness.   Neurological: She is alert and oriented to person, place, and time.   Skin: Skin is warm. No rash noted. No erythema.   Psychiatric: She has a normal mood and affect. Her behavior is normal.   Nursing note and vitals reviewed.      Assessment/Plan     Diagnoses and all orders for this visit:    Class 2 obesity due to excess calories without serious comorbidity with body mass index (BMI) of 36.0 to 36.9 in adult    continue contrave  Continue diet and exercise         This document has been electronically signed by:  Aisha Coffman PA-C

## 2018-08-02 DIAGNOSIS — R63.4 WEIGHT LOSS: Primary | ICD-10-CM

## 2018-08-02 DIAGNOSIS — Z13.220 SCREENING FOR HYPERLIPIDEMIA: ICD-10-CM

## 2018-08-02 DIAGNOSIS — K21.00 GASTROESOPHAGEAL REFLUX DISEASE WITH ESOPHAGITIS: ICD-10-CM

## 2018-08-02 DIAGNOSIS — E55.9 VITAMIN D DEFICIENCY: ICD-10-CM

## 2018-08-10 ENCOUNTER — LAB (OUTPATIENT)
Dept: FAMILY MEDICINE CLINIC | Facility: CLINIC | Age: 58
End: 2018-08-10

## 2018-08-10 DIAGNOSIS — Z13.220 SCREENING FOR HYPERLIPIDEMIA: ICD-10-CM

## 2018-08-10 DIAGNOSIS — R73.9 HYPERGLYCEMIA: ICD-10-CM

## 2018-08-10 DIAGNOSIS — R63.4 WEIGHT LOSS: ICD-10-CM

## 2018-08-10 DIAGNOSIS — K21.9 GERD WITHOUT ESOPHAGITIS: ICD-10-CM

## 2018-08-10 DIAGNOSIS — E55.9 VITAMIN D DEFICIENCY: ICD-10-CM

## 2018-08-10 LAB
25(OH)D3 SERPL-MCNC: 41 NG/ML
ALBUMIN SERPL-MCNC: 4.4 G/DL (ref 3.5–5)
ALBUMIN/GLOB SERPL: 1.5 G/DL (ref 1.5–2.5)
ALP SERPL-CCNC: 82 U/L (ref 35–104)
ALT SERPL W P-5'-P-CCNC: 24 U/L (ref 10–36)
ANION GAP SERPL CALCULATED.3IONS-SCNC: 3.1 MMOL/L (ref 3.6–11.2)
AST SERPL-CCNC: 24 U/L (ref 10–30)
BASOPHILS # BLD AUTO: 0.02 10*3/MM3 (ref 0–0.3)
BASOPHILS NFR BLD AUTO: 0.4 % (ref 0–2)
BILIRUB SERPL-MCNC: 0.4 MG/DL (ref 0.2–1.8)
BUN BLD-MCNC: 10 MG/DL (ref 7–21)
BUN/CREAT SERPL: 14.3 (ref 7–25)
CALCIUM SPEC-SCNC: 9.4 MG/DL (ref 7.7–10)
CHLORIDE SERPL-SCNC: 107 MMOL/L (ref 99–112)
CHOLEST SERPL-MCNC: 161 MG/DL (ref 0–200)
CO2 SERPL-SCNC: 30.9 MMOL/L (ref 24.3–31.9)
CREAT BLD-MCNC: 0.7 MG/DL (ref 0.43–1.29)
DEPRECATED RDW RBC AUTO: 42.1 FL (ref 37–54)
EOSINOPHIL # BLD AUTO: 0.1 10*3/MM3 (ref 0–0.7)
EOSINOPHIL NFR BLD AUTO: 1.9 % (ref 0–5)
ERYTHROCYTE [DISTWIDTH] IN BLOOD BY AUTOMATED COUNT: 13.6 % (ref 11.5–14.5)
GFR SERPL CREATININE-BSD FRML MDRD: 87 ML/MIN/1.73
GLOBULIN UR ELPH-MCNC: 2.9 GM/DL
GLUCOSE BLD-MCNC: 99 MG/DL (ref 70–110)
HBA1C MFR BLD: 5.6 % (ref 4.5–5.7)
HCT VFR BLD AUTO: 39.7 % (ref 37–47)
HDLC SERPL-MCNC: 61 MG/DL (ref 60–100)
HGB BLD-MCNC: 13 G/DL (ref 12–16)
IMM GRANULOCYTES # BLD: 0.01 10*3/MM3 (ref 0–0.03)
IMM GRANULOCYTES NFR BLD: 0.2 % (ref 0–0.5)
LDLC SERPL CALC-MCNC: 85 MG/DL (ref 0–100)
LDLC/HDLC SERPL: 1.39 {RATIO}
LYMPHOCYTES # BLD AUTO: 1.29 10*3/MM3 (ref 1–3)
LYMPHOCYTES NFR BLD AUTO: 25.1 % (ref 21–51)
MCH RBC QN AUTO: 28.3 PG (ref 27–33)
MCHC RBC AUTO-ENTMCNC: 32.7 G/DL (ref 33–37)
MCV RBC AUTO: 86.3 FL (ref 80–94)
MONOCYTES # BLD AUTO: 0.44 10*3/MM3 (ref 0.1–0.9)
MONOCYTES NFR BLD AUTO: 8.6 % (ref 0–10)
NEUTROPHILS # BLD AUTO: 3.27 10*3/MM3 (ref 1.4–6.5)
NEUTROPHILS NFR BLD AUTO: 63.8 % (ref 30–70)
OSMOLALITY SERPL CALC.SUM OF ELEC: 280.3 MOSM/KG (ref 273–305)
PLATELET # BLD AUTO: 229 10*3/MM3 (ref 130–400)
PMV BLD AUTO: 10.8 FL (ref 6–10)
POTASSIUM BLD-SCNC: 4.5 MMOL/L (ref 3.5–5.3)
PROT SERPL-MCNC: 7.3 G/DL (ref 6–8)
RBC # BLD AUTO: 4.6 10*6/MM3 (ref 4.2–5.4)
SODIUM BLD-SCNC: 141 MMOL/L (ref 135–153)
TRIGL SERPL-MCNC: 76 MG/DL (ref 0–150)
TSH SERPL DL<=0.05 MIU/L-ACNC: 0.98 MIU/ML (ref 0.55–4.78)
VLDLC SERPL-MCNC: 15.2 MG/DL
WBC NRBC COR # BLD: 5.13 10*3/MM3 (ref 4.5–12.5)

## 2018-08-10 PROCEDURE — 80061 LIPID PANEL: CPT | Performed by: PHYSICIAN ASSISTANT

## 2018-08-10 PROCEDURE — 82306 VITAMIN D 25 HYDROXY: CPT | Performed by: PHYSICIAN ASSISTANT

## 2018-08-10 PROCEDURE — 36415 COLL VENOUS BLD VENIPUNCTURE: CPT

## 2018-08-10 PROCEDURE — 85025 COMPLETE CBC W/AUTO DIFF WBC: CPT | Performed by: PHYSICIAN ASSISTANT

## 2018-08-10 PROCEDURE — 83036 HEMOGLOBIN GLYCOSYLATED A1C: CPT | Performed by: PHYSICIAN ASSISTANT

## 2018-08-10 PROCEDURE — 84443 ASSAY THYROID STIM HORMONE: CPT | Performed by: PHYSICIAN ASSISTANT

## 2018-08-10 PROCEDURE — 80053 COMPREHEN METABOLIC PANEL: CPT | Performed by: PHYSICIAN ASSISTANT

## 2018-08-17 ENCOUNTER — OFFICE VISIT (OUTPATIENT)
Dept: FAMILY MEDICINE CLINIC | Facility: CLINIC | Age: 58
End: 2018-08-17

## 2018-08-17 VITALS
OXYGEN SATURATION: 97 % | HEIGHT: 64 IN | DIASTOLIC BLOOD PRESSURE: 62 MMHG | TEMPERATURE: 97.2 F | BODY MASS INDEX: 35 KG/M2 | SYSTOLIC BLOOD PRESSURE: 116 MMHG | HEART RATE: 76 BPM | WEIGHT: 205 LBS

## 2018-08-17 DIAGNOSIS — M62.838 MUSCLE SPASM OF BOTH LOWER LEGS: ICD-10-CM

## 2018-08-17 DIAGNOSIS — K21.9 GERD WITHOUT ESOPHAGITIS: ICD-10-CM

## 2018-08-17 DIAGNOSIS — G47.62 NOCTURNAL LEG CRAMPS: Primary | ICD-10-CM

## 2018-08-17 DIAGNOSIS — J30.89 CHRONIC NONSEASONAL ALLERGIC RHINITIS DUE TO POLLEN: ICD-10-CM

## 2018-08-17 PROCEDURE — 99214 OFFICE O/P EST MOD 30 MIN: CPT | Performed by: PHYSICIAN ASSISTANT

## 2018-08-17 PROCEDURE — 93000 ELECTROCARDIOGRAM COMPLETE: CPT | Performed by: PHYSICIAN ASSISTANT

## 2018-08-17 PROCEDURE — 90471 IMMUNIZATION ADMIN: CPT | Performed by: PHYSICIAN ASSISTANT

## 2018-08-17 PROCEDURE — 90632 HEPA VACCINE ADULT IM: CPT | Performed by: PHYSICIAN ASSISTANT

## 2018-08-17 RX ORDER — LEVOCETIRIZINE DIHYDROCHLORIDE 5 MG/1
5 TABLET, FILM COATED ORAL EVERY EVENING
Qty: 90 TABLET | Refills: 3 | Status: SHIPPED | OUTPATIENT
Start: 2018-08-17 | End: 2019-02-08 | Stop reason: SDUPTHER

## 2018-08-17 RX ORDER — QUININE SULFATE 324 MG/1
648 CAPSULE ORAL NIGHTLY PRN
Qty: 30 CAPSULE | Refills: 3 | Status: SHIPPED | OUTPATIENT
Start: 2018-08-17 | End: 2018-11-29

## 2018-08-17 RX ORDER — PANTOPRAZOLE SODIUM 40 MG/1
40 TABLET, DELAYED RELEASE ORAL DAILY
Qty: 90 TABLET | Refills: 3 | Status: SHIPPED | OUTPATIENT
Start: 2018-08-17 | End: 2019-02-08 | Stop reason: SDUPTHER

## 2018-08-17 RX ORDER — TIZANIDINE 4 MG/1
4 TABLET ORAL NIGHTLY PRN
Qty: 90 TABLET | Refills: 3 | Status: SHIPPED | OUTPATIENT
Start: 2018-08-17 | End: 2019-02-08 | Stop reason: SDUPTHER

## 2018-08-18 NOTE — PROGRESS NOTES
"Alesia Horne is a 58 y.o. female.     Chief complaint-leg cramps    History of Present Illness     Leg cramps-  She complains of moderate to severe nocturnal leg cramps in bilateral lower extremities.  Onset several years ago.  No known injury.  She has had minimal relief with Sinemet, gabapentin, and Requip in the past.  Potassium level checked on 8/10/2018 normal.    Muscle spasms in legs-  Improved with Zanaflex but not at goal    Gastroesophageal reflux disease-controlled with pantoprazole    Allergic rhinitis-stable with xyzal    The following portions of the patient's history were reviewed and updated as appropriate: allergies, current medications, past family history, past medical history, past social history, past surgical history and problem list.    Review of Systems   Constitutional: Negative for activity change, appetite change and fever.   HENT: Negative for ear pain, sinus pressure and sore throat.    Eyes: Negative for pain and visual disturbance.   Respiratory: Negative for cough and chest tightness.    Cardiovascular: Negative for chest pain and palpitations.   Gastrointestinal: Negative for abdominal pain, constipation, diarrhea, nausea and vomiting.   Endocrine: Negative for polydipsia and polyuria.   Genitourinary: Negative for dysuria and frequency.   Musculoskeletal: Positive for myalgias. Negative for back pain.        Leg cramps   Skin: Negative for color change and rash.   Allergic/Immunologic: Negative for food allergies and immunocompromised state.   Neurological: Negative for dizziness, syncope and headaches.   Hematological: Negative for adenopathy. Does not bruise/bleed easily.   Psychiatric/Behavioral: Negative for hallucinations and suicidal ideas. The patient is not nervous/anxious.        /62   Pulse 76   Temp 97.2 °F (36.2 °C) (Oral)   Ht 162.6 cm (64.02\")   Wt 93 kg (205 lb)   SpO2 97%   BMI 35.17 kg/m²   Lab on 08/10/2018   Component Date Value Ref Range " Status   • Glucose 08/10/2018 99  70 - 110 mg/dL Final   • BUN 08/10/2018 10  7 - 21 mg/dL Final   • Creatinine 08/10/2018 0.70  0.43 - 1.29 mg/dL Final   • Sodium 08/10/2018 141  135 - 153 mmol/L Final   • Potassium 08/10/2018 4.5  3.5 - 5.3 mmol/L Final   • Chloride 08/10/2018 107  99 - 112 mmol/L Final   • CO2 08/10/2018 30.9  24.3 - 31.9 mmol/L Final   • Calcium 08/10/2018 9.4  7.7 - 10.0 mg/dL Final   • Total Protein 08/10/2018 7.3  6.0 - 8.0 g/dL Final   • Albumin 08/10/2018 4.40  3.50 - 5.00 g/dL Final   • ALT (SGPT) 08/10/2018 24  10 - 36 U/L Final   • AST (SGOT) 08/10/2018 24  10 - 30 U/L Final   • Alkaline Phosphatase 08/10/2018 82  35 - 104 U/L Final   • Total Bilirubin 08/10/2018 0.4  0.2 - 1.8 mg/dL Final   • eGFR Non  Amer 08/10/2018 87  >60 mL/min/1.73 Final   • Globulin 08/10/2018 2.9  gm/dL Final   • A/G Ratio 08/10/2018 1.5  1.5 - 2.5 g/dL Final   • BUN/Creatinine Ratio 08/10/2018 14.3  7.0 - 25.0 Final   • Anion Gap 08/10/2018 3.1* 3.6 - 11.2 mmol/L Final   • Total Cholesterol 08/10/2018 161  0 - 200 mg/dL Final   • Triglycerides 08/10/2018 76  0 - 150 mg/dL Final   • HDL Cholesterol 08/10/2018 61  60 - 100 mg/dL Final   • LDL Cholesterol  08/10/2018 85  0 - 100 mg/dL Final   • VLDL Cholesterol 08/10/2018 15.2  mg/dL Final   • LDL/HDL Ratio 08/10/2018 1.39   Final   • 25 Hydroxy, Vitamin D 08/10/2018 41.0  ng/ml Final   • Hemoglobin A1C 08/10/2018 5.60  4.50 - 5.70 % Final   • TSH 08/10/2018 0.983  0.550 - 4.780 mIU/mL Final   • WBC 08/10/2018 5.13  4.50 - 12.50 10*3/mm3 Final   • RBC 08/10/2018 4.60  4.20 - 5.40 10*6/mm3 Final   • Hemoglobin 08/10/2018 13.0  12.0 - 16.0 g/dL Final   • Hematocrit 08/10/2018 39.7  37.0 - 47.0 % Final   • MCV 08/10/2018 86.3  80.0 - 94.0 fL Final   • MCH 08/10/2018 28.3  27.0 - 33.0 pg Final   • MCHC 08/10/2018 32.7* 33.0 - 37.0 g/dL Final   • RDW 08/10/2018 13.6  11.5 - 14.5 % Final   • RDW-SD 08/10/2018 42.1  37.0 - 54.0 fl Final   • MPV 08/10/2018 10.8*  6.0 - 10.0 fL Final   • Platelets 08/10/2018 229  130 - 400 10*3/mm3 Final   • Neutrophil % 08/10/2018 63.8  30.0 - 70.0 % Final   • Lymphocyte % 08/10/2018 25.1  21.0 - 51.0 % Final   • Monocyte % 08/10/2018 8.6  0.0 - 10.0 % Final   • Eosinophil % 08/10/2018 1.9  0.0 - 5.0 % Final   • Basophil % 08/10/2018 0.4  0.0 - 2.0 % Final   • Immature Grans % 08/10/2018 0.2  0.0 - 0.5 % Final   • Neutrophils, Absolute 08/10/2018 3.27  1.40 - 6.50 10*3/mm3 Final   • Lymphocytes, Absolute 08/10/2018 1.29  1.00 - 3.00 10*3/mm3 Final   • Monocytes, Absolute 08/10/2018 0.44  0.10 - 0.90 10*3/mm3 Final   • Eosinophils, Absolute 08/10/2018 0.10  0.00 - 0.70 10*3/mm3 Final   • Basophils, Absolute 08/10/2018 0.02  0.00 - 0.30 10*3/mm3 Final   • Immature Grans, Absolute 08/10/2018 0.01  0.00 - 0.03 10*3/mm3 Final   • Osmolality Calc 08/10/2018 280.3  273.0 - 305.0 mOsm/kg Final       Physical Exam   Constitutional: She is oriented to person, place, and time. She appears well-developed and well-nourished.   HENT:   Head: Normocephalic and atraumatic.   Nose: Nose normal.   Mouth/Throat: Oropharynx is clear and moist.   Eyes: Pupils are equal, round, and reactive to light. Conjunctivae and EOM are normal.   Neck: Normal range of motion. Neck supple. No tracheal deviation present. No thyromegaly present.   Cardiovascular: Normal rate, regular rhythm, normal heart sounds and intact distal pulses.    No murmur heard.  Pulmonary/Chest: Effort normal and breath sounds normal. No respiratory distress. She has no wheezes.   Abdominal: Soft. Bowel sounds are normal. There is no tenderness. There is no guarding.   Musculoskeletal: Normal range of motion. She exhibits no edema or tenderness.   Lymphadenopathy:     She has no cervical adenopathy.   Neurological: She is alert and oriented to person, place, and time.   Skin: Skin is warm and dry. No rash noted.   Psychiatric: She has a normal mood and affect. Her behavior is normal.   Nursing note  and vitals reviewed.      Assessment/Plan     Diagnoses and all orders for this visit:    Nocturnal leg cramps  -     quiNINE (QUALAQUIN) 324 MG capsule; Take 2 capsules by mouth At Night As Needed (leg cramps).  -     ECG 12 Lead  -     CBC & Differential; Future  -     CBC & Differential; Standing    Muscle spasm of both lower legs  -     tiZANidine (ZANAFLEX) 4 MG tablet; Take 1 tablet by mouth At Night As Needed for Muscle Spasms.    GERD without esophagitis  -     pantoprazole (PROTONIX) 40 MG EC tablet; Take 1 tablet by mouth Daily.    Chronic nonseasonal allergic rhinitis due to pollen  -     levocetirizine (XYZAL) 5 MG tablet; Take 1 tablet by mouth Every Evening.    Other orders  -     Hepatitis A Vaccine Adult IM      Start quinine  We discussed the potential side effects including thrombocytopenia and the need for regular CBC checks.  EKG performed today as baseline  Repeat CBC in 2 weeks  I will follow-up with her in 6 weeks' time and recheck CBC again at that time.    August 17, 2018 at 11:40 AM  EKG  Performed by: Mariaa Frank CMA  Authorize spine interpreted by: Aisha Coffman PA-C  Indications: Baseline prior to starting quinine therapy  Not compared with previous EKG  No previous EKG available  Sinus rhythm  Normal rate at 70 bpm  Normal QRS axis  Normal conduction  ST segments and T waves normal  Clinical impression: Normal EKG         This document has been electronically signed by:  Aisha Coffman PA-C

## 2018-11-29 ENCOUNTER — OFFICE VISIT (OUTPATIENT)
Dept: FAMILY MEDICINE CLINIC | Facility: CLINIC | Age: 58
End: 2018-11-29

## 2018-11-29 VITALS
TEMPERATURE: 98.3 F | SYSTOLIC BLOOD PRESSURE: 137 MMHG | BODY MASS INDEX: 36.19 KG/M2 | HEART RATE: 65 BPM | DIASTOLIC BLOOD PRESSURE: 84 MMHG | OXYGEN SATURATION: 97 % | HEIGHT: 64 IN | WEIGHT: 212 LBS

## 2018-11-29 DIAGNOSIS — R42 VERTIGO: Primary | ICD-10-CM

## 2018-11-29 DIAGNOSIS — M62.838 MUSCLE SPASM OF BOTH LOWER LEGS: ICD-10-CM

## 2018-11-29 DIAGNOSIS — J30.1 NON-SEASONAL ALLERGIC RHINITIS DUE TO POLLEN: ICD-10-CM

## 2018-11-29 DIAGNOSIS — K21.9 GERD WITHOUT ESOPHAGITIS: ICD-10-CM

## 2018-11-29 PROCEDURE — 99214 OFFICE O/P EST MOD 30 MIN: CPT | Performed by: PHYSICIAN ASSISTANT

## 2018-11-29 PROCEDURE — 96372 THER/PROPH/DIAG INJ SC/IM: CPT | Performed by: PHYSICIAN ASSISTANT

## 2018-11-29 RX ORDER — SCOLOPAMINE TRANSDERMAL SYSTEM 1 MG/1
1 PATCH, EXTENDED RELEASE TRANSDERMAL
Qty: 10 EACH | Refills: 0 | Status: SHIPPED | OUTPATIENT
Start: 2018-11-29 | End: 2019-02-08

## 2018-11-29 RX ORDER — MECLIZINE HYDROCHLORIDE 25 MG/1
25 TABLET ORAL 3 TIMES DAILY PRN
Qty: 90 TABLET | Refills: 0 | Status: SHIPPED | OUTPATIENT
Start: 2018-11-29 | End: 2020-04-03 | Stop reason: SDUPTHER

## 2018-11-29 RX ORDER — FLUTICASONE PROPIONATE 50 MCG
SPRAY, SUSPENSION (ML) NASAL
Qty: 1 BOTTLE | Refills: 5 | Status: SHIPPED | OUTPATIENT
Start: 2018-11-29 | End: 2019-02-08 | Stop reason: SDUPTHER

## 2018-11-29 RX ORDER — METHYLPREDNISOLONE ACETATE 80 MG/ML
80 INJECTION, SUSPENSION INTRA-ARTICULAR; INTRALESIONAL; INTRAMUSCULAR; SOFT TISSUE ONCE
Status: COMPLETED | OUTPATIENT
Start: 2018-11-29 | End: 2018-11-29

## 2018-11-29 RX ADMIN — METHYLPREDNISOLONE ACETATE 80 MG: 80 INJECTION, SUSPENSION INTRA-ARTICULAR; INTRALESIONAL; INTRAMUSCULAR; SOFT TISSUE at 10:12

## 2018-11-29 NOTE — PROGRESS NOTES
Subjective   Jennifer Horne is a 58 y.o. female.     Chief complaint-dizzy    History of Present Illness       Dizzy-  She complains of room spinning with closing her eyes in shower or when laying down.  X 4 days.  Some relief with bonine otc for motion sickness.    Leg cramps-  She complains of moderate to severe nocturnal leg cramps in bilateral lower extremities.  Onset several years ago.  No known injury.  She has had minimal relief with Sinemet, gabapentin, and Requip in the past.  Potassium level checked on 8/10/2018 normal.  Some relief with peppermint oil.    Muscle spasms in legs-  Improved with Zanaflex     Gastroesophageal reflux disease-controlled with pantoprazole    Allergic rhinitis-not at goal due to vertigo    The following portions of the patient's history were reviewed and updated as appropriate: allergies, current medications, past family history, past medical history, past social history, past surgical history and problem list.    Review of Systems   Constitutional: Negative for activity change, appetite change and fever.   HENT: Negative for ear pain, sinus pressure and sore throat.    Eyes: Negative for pain and visual disturbance.   Respiratory: Negative for cough and chest tightness.    Cardiovascular: Negative for chest pain and palpitations.   Gastrointestinal: Negative for abdominal pain, constipation, diarrhea, nausea and vomiting.   Endocrine: Negative for polydipsia and polyuria.   Genitourinary: Negative for dysuria and frequency.   Musculoskeletal: Positive for myalgias (leg cramps). Negative for back pain.        Leg cramps   Skin: Negative for color change and rash.   Allergic/Immunologic: Negative for food allergies and immunocompromised state.   Neurological: Positive for dizziness. Negative for syncope and headaches.   Hematological: Negative for adenopathy. Does not bruise/bleed easily.   Psychiatric/Behavioral: Negative for hallucinations and suicidal ideas. The patient is not  "nervous/anxious.        /84   Pulse 65   Temp 98.3 °F (36.8 °C) (Oral)   Ht 162.6 cm (64.02\")   Wt 96.2 kg (212 lb)   SpO2 97%   BMI 36.37 kg/m²   Lab on 08/10/2018   Component Date Value Ref Range Status   • Glucose 08/10/2018 99  70 - 110 mg/dL Final   • BUN 08/10/2018 10  7 - 21 mg/dL Final   • Creatinine 08/10/2018 0.70  0.43 - 1.29 mg/dL Final   • Sodium 08/10/2018 141  135 - 153 mmol/L Final   • Potassium 08/10/2018 4.5  3.5 - 5.3 mmol/L Final   • Chloride 08/10/2018 107  99 - 112 mmol/L Final   • CO2 08/10/2018 30.9  24.3 - 31.9 mmol/L Final   • Calcium 08/10/2018 9.4  7.7 - 10.0 mg/dL Final   • Total Protein 08/10/2018 7.3  6.0 - 8.0 g/dL Final   • Albumin 08/10/2018 4.40  3.50 - 5.00 g/dL Final   • ALT (SGPT) 08/10/2018 24  10 - 36 U/L Final   • AST (SGOT) 08/10/2018 24  10 - 30 U/L Final   • Alkaline Phosphatase 08/10/2018 82  35 - 104 U/L Final   • Total Bilirubin 08/10/2018 0.4  0.2 - 1.8 mg/dL Final   • eGFR Non  Amer 08/10/2018 87  >60 mL/min/1.73 Final   • Globulin 08/10/2018 2.9  gm/dL Final   • A/G Ratio 08/10/2018 1.5  1.5 - 2.5 g/dL Final   • BUN/Creatinine Ratio 08/10/2018 14.3  7.0 - 25.0 Final   • Anion Gap 08/10/2018 3.1* 3.6 - 11.2 mmol/L Final   • Total Cholesterol 08/10/2018 161  0 - 200 mg/dL Final   • Triglycerides 08/10/2018 76  0 - 150 mg/dL Final   • HDL Cholesterol 08/10/2018 61  60 - 100 mg/dL Final   • LDL Cholesterol  08/10/2018 85  0 - 100 mg/dL Final   • VLDL Cholesterol 08/10/2018 15.2  mg/dL Final   • LDL/HDL Ratio 08/10/2018 1.39   Final   • 25 Hydroxy, Vitamin D 08/10/2018 41.0  ng/ml Final   • Hemoglobin A1C 08/10/2018 5.60  4.50 - 5.70 % Final   • TSH 08/10/2018 0.983  0.550 - 4.780 mIU/mL Final   • WBC 08/10/2018 5.13  4.50 - 12.50 10*3/mm3 Final   • RBC 08/10/2018 4.60  4.20 - 5.40 10*6/mm3 Final   • Hemoglobin 08/10/2018 13.0  12.0 - 16.0 g/dL Final   • Hematocrit 08/10/2018 39.7  37.0 - 47.0 % Final   • MCV 08/10/2018 86.3  80.0 - 94.0 fL Final   • " MCH 08/10/2018 28.3  27.0 - 33.0 pg Final   • MCHC 08/10/2018 32.7* 33.0 - 37.0 g/dL Final   • RDW 08/10/2018 13.6  11.5 - 14.5 % Final   • RDW-SD 08/10/2018 42.1  37.0 - 54.0 fl Final   • MPV 08/10/2018 10.8* 6.0 - 10.0 fL Final   • Platelets 08/10/2018 229  130 - 400 10*3/mm3 Final   • Neutrophil % 08/10/2018 63.8  30.0 - 70.0 % Final   • Lymphocyte % 08/10/2018 25.1  21.0 - 51.0 % Final   • Monocyte % 08/10/2018 8.6  0.0 - 10.0 % Final   • Eosinophil % 08/10/2018 1.9  0.0 - 5.0 % Final   • Basophil % 08/10/2018 0.4  0.0 - 2.0 % Final   • Immature Grans % 08/10/2018 0.2  0.0 - 0.5 % Final   • Neutrophils, Absolute 08/10/2018 3.27  1.40 - 6.50 10*3/mm3 Final   • Lymphocytes, Absolute 08/10/2018 1.29  1.00 - 3.00 10*3/mm3 Final   • Monocytes, Absolute 08/10/2018 0.44  0.10 - 0.90 10*3/mm3 Final   • Eosinophils, Absolute 08/10/2018 0.10  0.00 - 0.70 10*3/mm3 Final   • Basophils, Absolute 08/10/2018 0.02  0.00 - 0.30 10*3/mm3 Final   • Immature Grans, Absolute 08/10/2018 0.01  0.00 - 0.03 10*3/mm3 Final   • Osmolality Calc 08/10/2018 280.3  273.0 - 305.0 mOsm/kg Final       Physical Exam   Constitutional: She is oriented to person, place, and time. She appears well-developed and well-nourished.   HENT:   Head: Normocephalic and atraumatic.   Nose: Nose normal.   Mouth/Throat: Oropharynx is clear and moist.   Clear fluid behind tm's bilaterally   Eyes: Conjunctivae and EOM are normal. Pupils are equal, round, and reactive to light.   Neck: Normal range of motion. Neck supple. No tracheal deviation present. No thyromegaly present.   Cardiovascular: Normal rate, regular rhythm, normal heart sounds and intact distal pulses.   No murmur heard.  Pulmonary/Chest: Effort normal and breath sounds normal. No respiratory distress. She has no wheezes.   Abdominal: Soft. Bowel sounds are normal. There is no tenderness. There is no guarding.   Musculoskeletal: Normal range of motion. She exhibits no edema or tenderness.    Lymphadenopathy:     She has no cervical adenopathy.   Neurological: She is alert and oriented to person, place, and time.   Skin: Skin is warm and dry. No rash noted.   Psychiatric: She has a normal mood and affect. Her behavior is normal.   Nursing note and vitals reviewed.      Assessment/Plan     Diagnoses and all orders for this visit:    Vertigo  -     Scopolamine (TRANSDERM-SCOP, 1.5 MG,) 1.5 MG/3DAYS patch; Place 1 patch on the skin as directed by provider Every 72 (Seventy-Two) Hours.  -     methylPREDNISolone acetate (DEPO-medrol) injection 80 mg; Inject 1 mL into the appropriate muscle as directed by prescriber 1 (One) Time.    Non-seasonal allergic rhinitis due to pollen    GERD without esophagitis    Muscle spasm of both lower legs    Other orders  -     fluticasone (FLONASE) 50 MCG/ACT nasal spray; Administer 2 sprays both nostrils once daily  -     meclizine (ANTIVERT) 25 MG tablet; Take 1 tablet by mouth 3 (Three) Times a Day As Needed for dizziness.                 This document has been electronically signed by:  Aisha Coffman PA-C

## 2019-02-08 ENCOUNTER — OFFICE VISIT (OUTPATIENT)
Dept: FAMILY MEDICINE CLINIC | Facility: CLINIC | Age: 59
End: 2019-02-08

## 2019-02-08 VITALS
WEIGHT: 209 LBS | TEMPERATURE: 97.6 F | HEIGHT: 64 IN | OXYGEN SATURATION: 97 % | SYSTOLIC BLOOD PRESSURE: 118 MMHG | DIASTOLIC BLOOD PRESSURE: 70 MMHG | BODY MASS INDEX: 35.68 KG/M2 | HEART RATE: 83 BPM

## 2019-02-08 DIAGNOSIS — Z13.220 SCREENING FOR HYPERLIPIDEMIA: ICD-10-CM

## 2019-02-08 DIAGNOSIS — K21.9 GERD WITHOUT ESOPHAGITIS: ICD-10-CM

## 2019-02-08 DIAGNOSIS — E55.9 VITAMIN D DEFICIENCY: ICD-10-CM

## 2019-02-08 DIAGNOSIS — J30.89 CHRONIC NONSEASONAL ALLERGIC RHINITIS DUE TO POLLEN: ICD-10-CM

## 2019-02-08 DIAGNOSIS — M62.838 MUSCLE SPASM OF BOTH LOWER LEGS: ICD-10-CM

## 2019-02-08 DIAGNOSIS — J06.9 VIRAL UPPER RESPIRATORY TRACT INFECTION: Primary | ICD-10-CM

## 2019-02-08 PROCEDURE — 99214 OFFICE O/P EST MOD 30 MIN: CPT | Performed by: PHYSICIAN ASSISTANT

## 2019-02-08 RX ORDER — FLUTICASONE PROPIONATE 50 MCG
SPRAY, SUSPENSION (ML) NASAL
Qty: 1 BOTTLE | Refills: 5 | Status: SHIPPED | OUTPATIENT
Start: 2019-02-08 | End: 2019-09-23

## 2019-02-08 RX ORDER — TIZANIDINE 4 MG/1
4 TABLET ORAL NIGHTLY PRN
Qty: 90 TABLET | Refills: 3 | Status: SHIPPED | OUTPATIENT
Start: 2019-02-08 | End: 2019-08-16 | Stop reason: SDUPTHER

## 2019-02-08 RX ORDER — PANTOPRAZOLE SODIUM 40 MG/1
40 TABLET, DELAYED RELEASE ORAL DAILY
Qty: 90 TABLET | Refills: 3 | Status: SHIPPED | OUTPATIENT
Start: 2019-02-08 | End: 2019-08-16 | Stop reason: SDUPTHER

## 2019-02-08 RX ORDER — LEVOCETIRIZINE DIHYDROCHLORIDE 5 MG/1
5 TABLET, FILM COATED ORAL EVERY EVENING
Qty: 90 TABLET | Refills: 3 | Status: SHIPPED | OUTPATIENT
Start: 2019-02-08 | End: 2019-08-16 | Stop reason: SDUPTHER

## 2019-02-08 RX ORDER — PSEUDOEPHEDRINE HCL 120 MG/1
120 TABLET, FILM COATED, EXTENDED RELEASE ORAL EVERY 12 HOURS
Qty: 60 TABLET | Refills: 0 | Status: SHIPPED | OUTPATIENT
Start: 2019-02-08 | End: 2019-09-23

## 2019-02-08 NOTE — PROGRESS NOTES
"Subjective   Jennifer Horne is a 59 y.o. female.       Chief Complaint -  nasal congestion    History of Present Illness -      Nasal congestion-  She complains of nasal congestion and intermittent headache for the last 2 weeks.  Worse with seasonal allergens and weather changes.    Chronic allergic rhinitis-not at goal due to acute respiratory congestion    Gastroesophageal reflux disease-improved with pantoprazole    Muscle spasms in legs-currently controlled with conservative measures such as massage, heat, and Zanaflex muscle relaxant daily at bedtime    Vitamin D deficiency-stable with vitamin D supplementation    The following portions of the patient's history were reviewed and updated as appropriate: allergies, current medications, past family history, past medical history, past social history, past surgical history and problem list.    Review of Systems   Constitutional: Negative for activity change, appetite change and fever.   HENT: Positive for congestion. Negative for ear pain, sinus pressure and sore throat.    Eyes: Negative for pain and visual disturbance.   Respiratory: Negative for cough and chest tightness.    Cardiovascular: Negative for chest pain and palpitations.   Gastrointestinal: Negative for abdominal pain, constipation, diarrhea, nausea and vomiting.   Endocrine: Negative for polydipsia and polyuria.   Genitourinary: Negative for dysuria and frequency.   Musculoskeletal: Negative for back pain and myalgias.   Skin: Negative for color change and rash.   Allergic/Immunologic: Negative for food allergies and immunocompromised state.   Neurological: Negative for dizziness, syncope and headaches.   Hematological: Negative for adenopathy. Does not bruise/bleed easily.   Psychiatric/Behavioral: Negative for hallucinations and suicidal ideas. The patient is not nervous/anxious.        /70   Pulse 83   Temp 97.6 °F (36.4 °C) (Oral)   Ht 162.6 cm (64.02\")   Wt 94.8 kg (209 lb)   SpO2 97%  "  BMI 35.86 kg/m²   Lab on 08/10/2018   Component Date Value Ref Range Status   • Glucose 08/10/2018 99  70 - 110 mg/dL Final   • BUN 08/10/2018 10  7 - 21 mg/dL Final   • Creatinine 08/10/2018 0.70  0.43 - 1.29 mg/dL Final   • Sodium 08/10/2018 141  135 - 153 mmol/L Final   • Potassium 08/10/2018 4.5  3.5 - 5.3 mmol/L Final   • Chloride 08/10/2018 107  99 - 112 mmol/L Final   • CO2 08/10/2018 30.9  24.3 - 31.9 mmol/L Final   • Calcium 08/10/2018 9.4  7.7 - 10.0 mg/dL Final   • Total Protein 08/10/2018 7.3  6.0 - 8.0 g/dL Final   • Albumin 08/10/2018 4.40  3.50 - 5.00 g/dL Final   • ALT (SGPT) 08/10/2018 24  10 - 36 U/L Final   • AST (SGOT) 08/10/2018 24  10 - 30 U/L Final   • Alkaline Phosphatase 08/10/2018 82  35 - 104 U/L Final   • Total Bilirubin 08/10/2018 0.4  0.2 - 1.8 mg/dL Final   • eGFR Non  Amer 08/10/2018 87  >60 mL/min/1.73 Final   • Globulin 08/10/2018 2.9  gm/dL Final   • A/G Ratio 08/10/2018 1.5  1.5 - 2.5 g/dL Final   • BUN/Creatinine Ratio 08/10/2018 14.3  7.0 - 25.0 Final   • Anion Gap 08/10/2018 3.1* 3.6 - 11.2 mmol/L Final   • Total Cholesterol 08/10/2018 161  0 - 200 mg/dL Final   • Triglycerides 08/10/2018 76  0 - 150 mg/dL Final   • HDL Cholesterol 08/10/2018 61  60 - 100 mg/dL Final   • LDL Cholesterol  08/10/2018 85  0 - 100 mg/dL Final   • VLDL Cholesterol 08/10/2018 15.2  mg/dL Final   • LDL/HDL Ratio 08/10/2018 1.39   Final   • 25 Hydroxy, Vitamin D 08/10/2018 41.0  ng/ml Final   • Hemoglobin A1C 08/10/2018 5.60  4.50 - 5.70 % Final   • TSH 08/10/2018 0.983  0.550 - 4.780 mIU/mL Final   • WBC 08/10/2018 5.13  4.50 - 12.50 10*3/mm3 Final   • RBC 08/10/2018 4.60  4.20 - 5.40 10*6/mm3 Final   • Hemoglobin 08/10/2018 13.0  12.0 - 16.0 g/dL Final   • Hematocrit 08/10/2018 39.7  37.0 - 47.0 % Final   • MCV 08/10/2018 86.3  80.0 - 94.0 fL Final   • MCH 08/10/2018 28.3  27.0 - 33.0 pg Final   • MCHC 08/10/2018 32.7* 33.0 - 37.0 g/dL Final   • RDW 08/10/2018 13.6  11.5 - 14.5 % Final   •  RDW-SD 08/10/2018 42.1  37.0 - 54.0 fl Final   • MPV 08/10/2018 10.8* 6.0 - 10.0 fL Final   • Platelets 08/10/2018 229  130 - 400 10*3/mm3 Final   • Neutrophil % 08/10/2018 63.8  30.0 - 70.0 % Final   • Lymphocyte % 08/10/2018 25.1  21.0 - 51.0 % Final   • Monocyte % 08/10/2018 8.6  0.0 - 10.0 % Final   • Eosinophil % 08/10/2018 1.9  0.0 - 5.0 % Final   • Basophil % 08/10/2018 0.4  0.0 - 2.0 % Final   • Immature Grans % 08/10/2018 0.2  0.0 - 0.5 % Final   • Neutrophils, Absolute 08/10/2018 3.27  1.40 - 6.50 10*3/mm3 Final   • Lymphocytes, Absolute 08/10/2018 1.29  1.00 - 3.00 10*3/mm3 Final   • Monocytes, Absolute 08/10/2018 0.44  0.10 - 0.90 10*3/mm3 Final   • Eosinophils, Absolute 08/10/2018 0.10  0.00 - 0.70 10*3/mm3 Final   • Basophils, Absolute 08/10/2018 0.02  0.00 - 0.30 10*3/mm3 Final   • Immature Grans, Absolute 08/10/2018 0.01  0.00 - 0.03 10*3/mm3 Final   • Osmolality Calc 08/10/2018 280.3  273.0 - 305.0 mOsm/kg Final       Physical Exam   Constitutional: She is oriented to person, place, and time. She appears well-developed and well-nourished.   HENT:   Head: Normocephalic and atraumatic.   Mouth/Throat: Oropharynx is clear and moist.   Bilateral boggy nasal mucosa noted today   Eyes: Conjunctivae and EOM are normal. Pupils are equal, round, and reactive to light.   Neck: Normal range of motion. Neck supple. No tracheal deviation present. No thyromegaly present.   Cardiovascular: Normal rate, regular rhythm, normal heart sounds and intact distal pulses.   No murmur heard.  Pulmonary/Chest: Effort normal and breath sounds normal. No respiratory distress. She has no wheezes.   Abdominal: Soft. Bowel sounds are normal. There is no tenderness. There is no guarding.   Musculoskeletal: Normal range of motion. She exhibits no edema or tenderness.   Lymphadenopathy:     She has no cervical adenopathy.   Neurological: She is alert and oriented to person, place, and time.   Skin: Skin is warm and dry. No rash  noted.   Psychiatric: She has a normal mood and affect. Her behavior is normal.   Nursing note and vitals reviewed.      Assessment/Plan     Diagnoses and all orders for this visit:    Viral upper respiratory tract infection  Comments:  advised sudafed prn    Chronic nonseasonal allergic rhinitis due to pollen  -     levocetirizine (XYZAL) 5 MG tablet; Take 1 tablet by mouth Every Evening.    GERD without esophagitis  -     pantoprazole (PROTONIX) 40 MG EC tablet; Take 1 tablet by mouth Daily.  -     Comprehensive Metabolic Panel; Future    Muscle spasm of both lower legs  -     tiZANidine (ZANAFLEX) 4 MG tablet; Take 1 tablet by mouth At Night As Needed for Muscle Spasms.    Vitamin D deficiency  -     Vitamin D 25 Hydroxy; Future    Screening for hyperlipidemia  -     Lipid Panel; Future    Other orders  -     pseudoephedrine (SUDAFED 12 HOUR) 120 MG 12 hr tablet; Take 1 tablet by mouth Every 12 (Twelve) Hours.  -     fluticasone (FLONASE) 50 MCG/ACT nasal spray; Administer 2 sprays both nostrils once daily                 This document has been electronically signed by:  Aisha Coffman PA-C

## 2019-02-28 ENCOUNTER — CLINICAL SUPPORT (OUTPATIENT)
Dept: FAMILY MEDICINE CLINIC | Facility: CLINIC | Age: 59
End: 2019-02-28

## 2019-02-28 DIAGNOSIS — Z23 ENCOUNTER FOR IMMUNIZATION: Primary | ICD-10-CM

## 2019-02-28 PROCEDURE — 90632 HEPA VACCINE ADULT IM: CPT | Performed by: GENERAL PRACTICE

## 2019-02-28 PROCEDURE — 90471 IMMUNIZATION ADMIN: CPT | Performed by: GENERAL PRACTICE

## 2019-06-21 ENCOUNTER — HOSPITAL ENCOUNTER (OUTPATIENT)
Dept: MAMMOGRAPHY | Facility: HOSPITAL | Age: 59
Discharge: HOME | End: 2019-06-21
Admitting: GENERAL PRACTICE

## 2019-06-21 DIAGNOSIS — Z12.39 SCREENING BREAST EXAMINATION: ICD-10-CM

## 2019-06-21 PROCEDURE — 77063 BREAST TOMOSYNTHESIS BI: CPT | Performed by: RADIOLOGY

## 2019-06-21 PROCEDURE — 77063 BREAST TOMOSYNTHESIS BI: CPT

## 2019-06-21 PROCEDURE — 77067 SCR MAMMO BI INCL CAD: CPT

## 2019-06-21 PROCEDURE — 77067 SCR MAMMO BI INCL CAD: CPT | Performed by: RADIOLOGY

## 2019-06-27 ENCOUNTER — TELEPHONE (OUTPATIENT)
Dept: FAMILY MEDICINE CLINIC | Facility: CLINIC | Age: 59
End: 2019-06-27

## 2019-06-27 ENCOUNTER — HOSPITAL ENCOUNTER (OUTPATIENT)
Dept: MAMMOGRAPHY | Facility: HOSPITAL | Age: 59
Discharge: HOME | End: 2019-06-27
Admitting: RADIOLOGY

## 2019-06-27 DIAGNOSIS — R92.8 ABNORMAL MAMMOGRAM OF RIGHT BREAST: ICD-10-CM

## 2019-06-27 PROCEDURE — G0279 TOMOSYNTHESIS, MAMMO: HCPCS

## 2019-06-27 PROCEDURE — 77061 BREAST TOMOSYNTHESIS UNI: CPT | Performed by: RADIOLOGY

## 2019-06-27 PROCEDURE — 77065 DX MAMMO INCL CAD UNI: CPT | Performed by: RADIOLOGY

## 2019-06-27 PROCEDURE — 77065 DX MAMMO INCL CAD UNI: CPT

## 2019-06-27 NOTE — TELEPHONE ENCOUNTER
I called adriana spoke with her about mammogram and    She is having her spot compression today.                    ----- Message from MAIK An sent at 6/24/2019 11:05 AM EDT -----  Please inform the patient that her mammogram showed an irregularity in the right breast but further imaging was advised with spot compression of that asymmetric in her right breast area.  Has she had this additional imaging already?

## 2019-08-02 ENCOUNTER — LAB (OUTPATIENT)
Dept: FAMILY MEDICINE CLINIC | Facility: CLINIC | Age: 59
End: 2019-08-02

## 2019-08-02 DIAGNOSIS — Z13.220 SCREENING FOR HYPERLIPIDEMIA: ICD-10-CM

## 2019-08-02 DIAGNOSIS — E55.9 VITAMIN D DEFICIENCY: ICD-10-CM

## 2019-08-02 DIAGNOSIS — K21.9 GERD WITHOUT ESOPHAGITIS: ICD-10-CM

## 2019-08-02 DIAGNOSIS — G47.62 NOCTURNAL LEG CRAMPS: ICD-10-CM

## 2019-08-02 LAB
25(OH)D3 SERPL-MCNC: 51.1 NG/ML (ref 30–100)
ALBUMIN SERPL-MCNC: 4.7 G/DL (ref 3.5–5.2)
ALBUMIN/GLOB SERPL: 1.7 G/DL
ALP SERPL-CCNC: 77 U/L (ref 39–117)
ALT SERPL W P-5'-P-CCNC: 21 U/L (ref 1–33)
ANION GAP SERPL CALCULATED.3IONS-SCNC: 12.6 MMOL/L (ref 5–15)
AST SERPL-CCNC: 20 U/L (ref 1–32)
BASOPHILS # BLD AUTO: 0.02 10*3/MM3 (ref 0–0.2)
BASOPHILS NFR BLD AUTO: 0.3 % (ref 0–1.5)
BILIRUB SERPL-MCNC: 0.5 MG/DL (ref 0.2–1.2)
BUN BLD-MCNC: 13 MG/DL (ref 6–20)
BUN/CREAT SERPL: 19.1 (ref 7–25)
CALCIUM SPEC-SCNC: 9.8 MG/DL (ref 8.6–10.5)
CHLORIDE SERPL-SCNC: 103 MMOL/L (ref 98–107)
CHOLEST SERPL-MCNC: 143 MG/DL (ref 0–200)
CO2 SERPL-SCNC: 26.4 MMOL/L (ref 22–29)
CREAT BLD-MCNC: 0.68 MG/DL (ref 0.57–1)
DEPRECATED RDW RBC AUTO: 43.9 FL (ref 37–54)
EOSINOPHIL # BLD AUTO: 0.17 10*3/MM3 (ref 0–0.4)
EOSINOPHIL NFR BLD AUTO: 2.6 % (ref 0.3–6.2)
ERYTHROCYTE [DISTWIDTH] IN BLOOD BY AUTOMATED COUNT: 13.6 % (ref 12.3–15.4)
GFR SERPL CREATININE-BSD FRML MDRD: 89 ML/MIN/1.73
GLOBULIN UR ELPH-MCNC: 2.7 GM/DL
GLUCOSE BLD-MCNC: 92 MG/DL (ref 65–99)
HCT VFR BLD AUTO: 44.8 % (ref 34–46.6)
HDLC SERPL-MCNC: 53 MG/DL (ref 40–60)
HGB BLD-MCNC: 13.8 G/DL (ref 12–15.9)
IMM GRANULOCYTES # BLD AUTO: 0.02 10*3/MM3 (ref 0–0.05)
IMM GRANULOCYTES NFR BLD AUTO: 0.3 % (ref 0–0.5)
LDLC SERPL CALC-MCNC: 80 MG/DL (ref 0–100)
LDLC/HDLC SERPL: 1.5 {RATIO}
LYMPHOCYTES # BLD AUTO: 1.28 10*3/MM3 (ref 0.7–3.1)
LYMPHOCYTES NFR BLD AUTO: 19.7 % (ref 19.6–45.3)
MCH RBC QN AUTO: 27.2 PG (ref 26.6–33)
MCHC RBC AUTO-ENTMCNC: 30.8 G/DL (ref 31.5–35.7)
MCV RBC AUTO: 88.2 FL (ref 79–97)
MONOCYTES # BLD AUTO: 0.56 10*3/MM3 (ref 0.1–0.9)
MONOCYTES NFR BLD AUTO: 8.6 % (ref 5–12)
NEUTROPHILS # BLD AUTO: 4.45 10*3/MM3 (ref 1.7–7)
NEUTROPHILS NFR BLD AUTO: 68.5 % (ref 42.7–76)
NRBC BLD AUTO-RTO: 0 /100 WBC (ref 0–0.2)
PLATELET # BLD AUTO: 230 10*3/MM3 (ref 140–450)
PMV BLD AUTO: 11.2 FL (ref 6–12)
POTASSIUM BLD-SCNC: 4.6 MMOL/L (ref 3.5–5.2)
PROT SERPL-MCNC: 7.4 G/DL (ref 6–8.5)
RBC # BLD AUTO: 5.08 10*6/MM3 (ref 3.77–5.28)
SODIUM BLD-SCNC: 142 MMOL/L (ref 136–145)
TRIGL SERPL-MCNC: 52 MG/DL (ref 0–150)
VLDLC SERPL-MCNC: 10.4 MG/DL (ref 5–40)
WBC NRBC COR # BLD: 6.5 10*3/MM3 (ref 3.4–10.8)

## 2019-08-02 PROCEDURE — 80053 COMPREHEN METABOLIC PANEL: CPT | Performed by: PHYSICIAN ASSISTANT

## 2019-08-02 PROCEDURE — 85025 COMPLETE CBC W/AUTO DIFF WBC: CPT | Performed by: PHYSICIAN ASSISTANT

## 2019-08-02 PROCEDURE — 82306 VITAMIN D 25 HYDROXY: CPT | Performed by: PHYSICIAN ASSISTANT

## 2019-08-02 PROCEDURE — 80061 LIPID PANEL: CPT | Performed by: PHYSICIAN ASSISTANT

## 2019-08-16 ENCOUNTER — TELEPHONE (OUTPATIENT)
Dept: FAMILY MEDICINE CLINIC | Facility: CLINIC | Age: 59
End: 2019-08-16

## 2019-08-16 ENCOUNTER — OFFICE VISIT (OUTPATIENT)
Dept: FAMILY MEDICINE CLINIC | Facility: CLINIC | Age: 59
End: 2019-08-16

## 2019-08-16 VITALS
TEMPERATURE: 97.9 F | WEIGHT: 204 LBS | SYSTOLIC BLOOD PRESSURE: 118 MMHG | BODY MASS INDEX: 34.83 KG/M2 | OXYGEN SATURATION: 96 % | HEIGHT: 64 IN | HEART RATE: 84 BPM | DIASTOLIC BLOOD PRESSURE: 68 MMHG

## 2019-08-16 DIAGNOSIS — M62.838 MUSCLE SPASM OF BOTH LOWER LEGS: ICD-10-CM

## 2019-08-16 DIAGNOSIS — J30.89 CHRONIC NONSEASONAL ALLERGIC RHINITIS DUE TO POLLEN: ICD-10-CM

## 2019-08-16 DIAGNOSIS — K21.9 GERD WITHOUT ESOPHAGITIS: ICD-10-CM

## 2019-08-16 DIAGNOSIS — B07.8 OTHER VIRAL WARTS: ICD-10-CM

## 2019-08-16 DIAGNOSIS — I20.8 ANGINA AT REST (HCC): Primary | ICD-10-CM

## 2019-08-16 PROCEDURE — 11420 EXC H-F-NK-SP B9+MARG 0.5/<: CPT | Performed by: PHYSICIAN ASSISTANT

## 2019-08-16 PROCEDURE — 99214 OFFICE O/P EST MOD 30 MIN: CPT | Performed by: PHYSICIAN ASSISTANT

## 2019-08-16 RX ORDER — TIZANIDINE 4 MG/1
4 TABLET ORAL NIGHTLY PRN
Qty: 90 TABLET | Refills: 3 | Status: SHIPPED | OUTPATIENT
Start: 2019-08-16 | End: 2020-04-03 | Stop reason: SDUPTHER

## 2019-08-16 RX ORDER — LEVOCETIRIZINE DIHYDROCHLORIDE 5 MG/1
5 TABLET, FILM COATED ORAL EVERY EVENING
Qty: 90 TABLET | Refills: 3 | Status: SHIPPED | OUTPATIENT
Start: 2019-08-16 | End: 2020-04-03 | Stop reason: SDUPTHER

## 2019-08-16 RX ORDER — PANTOPRAZOLE SODIUM 40 MG/1
40 TABLET, DELAYED RELEASE ORAL DAILY
Qty: 90 TABLET | Refills: 3 | Status: SHIPPED | OUTPATIENT
Start: 2019-08-16 | End: 2020-04-03 | Stop reason: SDUPTHER

## 2019-08-16 RX ORDER — NITROGLYCERIN 0.4 MG/1
0.4 TABLET SUBLINGUAL
Qty: 30 TABLET | Refills: 5 | Status: SHIPPED | OUTPATIENT
Start: 2019-08-16 | End: 2020-01-10

## 2019-08-16 NOTE — PROGRESS NOTES
Subjective   Jennifer Horne is a 59 y.o. female.       Chief Complaint -chest pain    History of Present Illness -      Chest pain-  She was seen at [facility] on 8/7/2019 in emergency room due to chest pain.  She describes intermittent moderate to severe mid chest pressure without radiation.  She denies any associated nausea or diaphoresis.  She was started on Imdur but was unable to tolerate due to significant nausea and dizziness.  Not at goal    8/7/2019 echocardiogram revealed left ventricular ejection fraction 60% - 65%.,  Grade 1 diastolic dysfunction, and otherwise unremarkable    8/7/2019 exercise stress test revealed abnormal study with small area of moderate ischemia in the mid anterior wall.  Normal left ventricular systolic function.  Ventricular ejection fraction 52%    Wart-  She complains of a wart on her left neck that gets irritated and inflamed when caught on her necklace.  Onset greater than 6 months.    Gastroesophageal reflux disease-stable with Protonix    Chronic allergic rhinitis-improved with Xyzal and Flonase    Muscle spasm bilateral legs-stable with tizanidine as needed    The following portions of the patient's history were reviewed and updated as appropriate: allergies, current medications, past family history, past medical history, past social history, past surgical history and problem list.    Review of Systems   Constitutional: Negative for activity change, appetite change and fever.   HENT: Negative for ear pain, sinus pressure and sore throat.    Eyes: Negative for pain and visual disturbance.   Respiratory: Negative for cough and chest tightness.    Cardiovascular: Positive for chest pain. Negative for palpitations.   Gastrointestinal: Positive for nausea. Negative for abdominal pain, constipation, diarrhea and vomiting.   Endocrine: Negative for polydipsia and polyuria.   Genitourinary: Negative for dysuria and frequency.   Musculoskeletal: Positive for myalgias. Negative  "for back pain.   Skin: Negative for color change and rash.        Wart left neck   Allergic/Immunologic: Negative for food allergies and immunocompromised state.   Neurological: Negative for dizziness, syncope and headaches.   Hematological: Negative for adenopathy. Does not bruise/bleed easily.   Psychiatric/Behavioral: Negative for hallucinations and suicidal ideas. The patient is not nervous/anxious.        /68   Pulse 84   Temp 97.9 °F (36.6 °C) (Oral)   Ht 162.6 cm (64.02\")   Wt 92.5 kg (204 lb)   SpO2 96%   BMI 35.00 kg/m²   Lab on 08/02/2019   Component Date Value Ref Range Status   • Glucose 08/02/2019 92  65 - 99 mg/dL Final   • BUN 08/02/2019 13  6 - 20 mg/dL Final   • Creatinine 08/02/2019 0.68  0.57 - 1.00 mg/dL Final   • Sodium 08/02/2019 142  136 - 145 mmol/L Final   • Potassium 08/02/2019 4.6  3.5 - 5.2 mmol/L Final   • Chloride 08/02/2019 103  98 - 107 mmol/L Final   • CO2 08/02/2019 26.4  22.0 - 29.0 mmol/L Final   • Calcium 08/02/2019 9.8  8.6 - 10.5 mg/dL Final   • Total Protein 08/02/2019 7.4  6.0 - 8.5 g/dL Final   • Albumin 08/02/2019 4.70  3.50 - 5.20 g/dL Final   • ALT (SGPT) 08/02/2019 21  1 - 33 U/L Final   • AST (SGOT) 08/02/2019 20  1 - 32 U/L Final   • Alkaline Phosphatase 08/02/2019 77  39 - 117 U/L Final   • Total Bilirubin 08/02/2019 0.5  0.2 - 1.2 mg/dL Final   • eGFR Non African Amer 08/02/2019 89  >60 mL/min/1.73 Final   • Globulin 08/02/2019 2.7  gm/dL Final   • A/G Ratio 08/02/2019 1.7  g/dL Final   • BUN/Creatinine Ratio 08/02/2019 19.1  7.0 - 25.0 Final   • Anion Gap 08/02/2019 12.6  5.0 - 15.0 mmol/L Final   • Total Cholesterol 08/02/2019 143  0 - 200 mg/dL Final   • Triglycerides 08/02/2019 52  0 - 150 mg/dL Final   • HDL Cholesterol 08/02/2019 53  40 - 60 mg/dL Final   • LDL Cholesterol  08/02/2019 80  0 - 100 mg/dL Final   • VLDL Cholesterol 08/02/2019 10.4  5 - 40 mg/dL Final   • LDL/HDL Ratio 08/02/2019 1.50   Final   • 25 Hydroxy, Vitamin D 08/02/2019 51.1  " 30.0 - 100.0 ng/ml Final   • WBC 08/02/2019 6.50  3.40 - 10.80 10*3/mm3 Final   • RBC 08/02/2019 5.08  3.77 - 5.28 10*6/mm3 Final   • Hemoglobin 08/02/2019 13.8  12.0 - 15.9 g/dL Final   • Hematocrit 08/02/2019 44.8  34.0 - 46.6 % Final   • MCV 08/02/2019 88.2  79.0 - 97.0 fL Final   • MCH 08/02/2019 27.2  26.6 - 33.0 pg Final   • MCHC 08/02/2019 30.8* 31.5 - 35.7 g/dL Final   • RDW 08/02/2019 13.6  12.3 - 15.4 % Final   • RDW-SD 08/02/2019 43.9  37.0 - 54.0 fl Final   • MPV 08/02/2019 11.2  6.0 - 12.0 fL Final   • Platelets 08/02/2019 230  140 - 450 10*3/mm3 Final   • Neutrophil % 08/02/2019 68.5  42.7 - 76.0 % Final   • Lymphocyte % 08/02/2019 19.7  19.6 - 45.3 % Final   • Monocyte % 08/02/2019 8.6  5.0 - 12.0 % Final   • Eosinophil % 08/02/2019 2.6  0.3 - 6.2 % Final   • Basophil % 08/02/2019 0.3  0.0 - 1.5 % Final   • Immature Grans % 08/02/2019 0.3  0.0 - 0.5 % Final   • Neutrophils, Absolute 08/02/2019 4.45  1.70 - 7.00 10*3/mm3 Final   • Lymphocytes, Absolute 08/02/2019 1.28  0.70 - 3.10 10*3/mm3 Final   • Monocytes, Absolute 08/02/2019 0.56  0.10 - 0.90 10*3/mm3 Final   • Eosinophils, Absolute 08/02/2019 0.17  0.00 - 0.40 10*3/mm3 Final   • Basophils, Absolute 08/02/2019 0.02  0.00 - 0.20 10*3/mm3 Final   • Immature Grans, Absolute 08/02/2019 0.02  0.00 - 0.05 10*3/mm3 Final   • [facility] 08/02/2019 0.0  0.0 - 0.2 /100 WBC Final       Physical Exam   Constitutional: She is oriented to person, place, and time. She appears well-developed and well-nourished.   HENT:   Head: Normocephalic and atraumatic.   Nose: Nose normal.   Mouth/Throat: Oropharynx is clear and moist.   Eyes: Conjunctivae and EOM are normal. Pupils are equal, round, and reactive to light.   Neck: Normal range of motion. Neck supple. No tracheal deviation present. No thyromegaly present.   Cardiovascular: Normal rate, regular rhythm, normal heart sounds and intact distal pulses.   No murmur heard.  Pulmonary/Chest: Effort normal and breath sounds  normal. No respiratory distress. She has no wheezes.   Abdominal: Soft. Bowel sounds are normal. There is no tenderness. There is no guarding.   Musculoskeletal: Normal range of motion. She exhibits no edema or tenderness.   Lymphadenopathy:     She has no cervical adenopathy.   Neurological: She is alert and oriented to person, place, and time.   Skin: Skin is warm and dry. No rash noted.   Approximately 0.3 x 0.3cm wart noted on left medial neck with inflammation   Psychiatric: She has a normal mood and affect. Her behavior is normal.   Nursing note and vitals reviewed.      Assessment/Plan     Diagnoses and all orders for this visit:    Angina at rest (CMS/[facility])  Comments:  Discontinue Imdur due to nausea  Prescription written for Nitrostat as needed and advised to go to ER if chest pain persists  Orders:  -     aspirin 81 MG tablet; Take 1 tablet by mouth Daily.  -     nitroglycerin (NITROSTAT) 0.4 MG SL tablet; Place 1 tablet under the tongue Every 5 (Five) Minutes As Needed for Chest Pain. Take no more than 3 doses in 15 minutes.  -     Ambulatory Referral to Cardiology    GERD without esophagitis  Comments:  Continue Protonix  Orders:  -     pantoprazole (PROTONIX) 40 MG EC tablet; Take 1 tablet by mouth Daily.    Chronic nonseasonal allergic rhinitis due to pollen  Comments:  Continue Xyzal and Flonase  Orders:  -     levocetirizine (XYZAL) 5 MG tablet; Take 1 tablet by mouth Every Evening.    Muscle spasm of both lower legs  Comments:  Continue tizanidine  Orders:  -     tiZANidine (ZANAFLEX) 4 MG tablet; Take 1 tablet by mouth At Night As Needed for Muscle Spasms.    Other viral warts  Comments:  Wart excised from left neck.  Patient tolerated the procedure well.    [facility] emergency room records, stress, echo testing, EKG and labs were reviewed today    Procedure: Wart excision (0.3 x 0.3cm)  Provider: Aisha Coffman PA-C  Indication: Wart- inflamed and growing larger  Description: The left neck  area was prepped and draped in sterile fashion.  The wart was excised using a scalpel.  Pressure was held and sterile dressing was placed.  No convocation  Estimate blood loss: Minimal  Patient tolerated procedure well.         This document has been electronically signed by:  Aisha Coffman PA-C

## 2019-08-16 NOTE — TELEPHONE ENCOUNTER
CALLED [facility] IN [facility]      ----- Message from AMIK An sent at 8/16/2019  8:31 AM EDT -----   Please get her cardiac stress test and [facility] records from august 2019

## 2019-08-16 NOTE — TELEPHONE ENCOUNTER
-I called and receives records.          ---- Message from MAIK An sent at 8/16/2019  8:31 AM EDT -----   Please get her cardiac stress test and [facility] records from august 2019

## 2019-09-23 ENCOUNTER — CONSULT (OUTPATIENT)
Dept: CARDIOLOGY | Facility: CLINIC | Age: 59
End: 2019-09-23

## 2019-09-23 ENCOUNTER — PREP FOR SURGERY (OUTPATIENT)
Dept: OTHER | Facility: HOSPITAL | Age: 59
End: 2019-09-23

## 2019-09-23 VITALS
DIASTOLIC BLOOD PRESSURE: 74 MMHG | SYSTOLIC BLOOD PRESSURE: 132 MMHG | WEIGHT: 211 LBS | HEART RATE: 72 BPM | HEIGHT: 64 IN | BODY MASS INDEX: 36.02 KG/M2

## 2019-09-23 DIAGNOSIS — I20.0 UNSTABLE ANGINA (HCC): ICD-10-CM

## 2019-09-23 DIAGNOSIS — R07.2 PRECORDIAL PAIN: Primary | ICD-10-CM

## 2019-09-23 DIAGNOSIS — I20.0 UNSTABLE ANGINA (HCC): Primary | ICD-10-CM

## 2019-09-23 PROCEDURE — 99204 OFFICE O/P NEW MOD 45 MIN: CPT | Performed by: INTERNAL MEDICINE

## 2019-09-23 RX ORDER — ASPIRIN 325 MG
325 TABLET, DELAYED RELEASE (ENTERIC COATED) ORAL DAILY
Status: CANCELLED | OUTPATIENT
Start: 2019-09-24

## 2019-09-23 RX ORDER — ONDANSETRON 2 MG/ML
4 INJECTION INTRAMUSCULAR; INTRAVENOUS EVERY 8 HOURS PRN
Status: CANCELLED | OUTPATIENT
Start: 2019-09-23

## 2019-09-23 RX ORDER — ASPIRIN 325 MG
325 TABLET ORAL ONCE
Status: CANCELLED | OUTPATIENT
Start: 2019-09-23 | End: 2019-09-23

## 2019-09-23 RX ORDER — LANOLIN ALCOHOL/MO/W.PET/CERES
1000 CREAM (GRAM) TOPICAL EVERY MORNING
COMMUNITY

## 2019-09-23 RX ORDER — SODIUM CHLORIDE 0.9 % (FLUSH) 0.9 %
10 SYRINGE (ML) INJECTION AS NEEDED
Status: CANCELLED | OUTPATIENT
Start: 2019-09-23

## 2019-09-23 RX ORDER — AMLODIPINE BESYLATE 2.5 MG/1
2.5 TABLET ORAL DAILY
Qty: 30 TABLET | Refills: 11 | Status: SHIPPED | OUTPATIENT
Start: 2019-09-23 | End: 2019-10-04

## 2019-09-23 RX ORDER — SODIUM CHLORIDE 0.9 % (FLUSH) 0.9 %
3 SYRINGE (ML) INJECTION EVERY 12 HOURS SCHEDULED
Status: CANCELLED | OUTPATIENT
Start: 2019-09-23

## 2019-09-23 RX ORDER — NITROGLYCERIN 0.4 MG/1
0.4 TABLET SUBLINGUAL
Status: CANCELLED | OUTPATIENT
Start: 2019-09-23

## 2019-09-23 RX ORDER — CLOPIDOGREL BISULFATE 75 MG/1
75 TABLET ORAL DAILY
Qty: 30 TABLET | Refills: 3 | Status: SHIPPED | OUTPATIENT
Start: 2019-09-23 | End: 2019-09-27 | Stop reason: HOSPADM

## 2019-09-23 RX ORDER — MULTIVIT WITH MINERALS/LUTEIN
250 TABLET ORAL DAILY
Status: ON HOLD | COMMUNITY
End: 2019-09-27

## 2019-09-23 RX ORDER — MELATONIN
1000 EVERY MORNING
COMMUNITY

## 2019-09-23 NOTE — PROGRESS NOTES
[facility] at [facility]  Consultation H&P  Jennifer Horne  1960    There is no work phone number on file..    VISIT DATE:  09/23/2019    PCP: Camilo Armenta MD  [address] [facility] [id number]    CC:  Chief Complaint   Patient presents with   • Chest Pain       ASSESSMENT:   Diagnosis Plan   1. Precordial pain           PLAN:  Unstable angina: Myocardial perfusion imaging concerning for LAD distribution ischemia.  Exercise treadmill testing was also clinically and electrically abnormal.  Previous intolerance to aspirin due to dyspepsia, starting clopidogrel 75 mg p.o. daily.  Potential previous intolerance to Imdur, starting amlodipine 2.5 mg p.o. daily.  Arranging for more definitive evaluation of coronary anatomy with cardiac catheterization.  Risk, benefits, alternatives to cardiac catheterization discussed in detail.  The patient verbalized understanding these concepts and agrees to proceed with procedure.    History of Present Illness   59-year-old female who has had recurrent episodes of precordial chest discomfort.  Describes a dull smothering sensation in her chest which often radiates to her back between her scapula.  Intermittently triggered by exertion, relieved with rest.  Also intermittently improved with sublingual nitroglycerin.  Onset over the previous 6 to 8 weeks.  Some mild fatigue and shortness of breath, at least one episode of moderate severe chest discomfort with associated diaphoresis and nausea.  She underwent recent echo which revealed normal LV systolic function with no underlying valvular heart disease.  She underwent an exercise myocardial perfusion imaging which revealed preserved functional capacity but did have dynamic ST repolarization abnormalities in addition to an anterior reversible perfusion defect.  She has had previous issues with dyspepsia and gastroesophageal reflux disease however these episodes have not consistently been triggered with  "certain types of food.    PHYSICAL EXAMINATION:  Vitals:    09/23/19 1041   BP: 132/74   BP Location: Right arm   Patient Position: Sitting   Pulse: 72   Weight: 95.7 kg (211 lb)   Height: 162.6 cm (64\")     General Appearance:    Alert, cooperative, no distress, appears stated age   Head:    Normocephalic, without obvious abnormality, atraumatic   Eyes:    conjunctiva/corneas clear, EOM's intact, fundi     benign, both eyes   Ears:    Normal TM's and external ear canals, both ears   Nose:   Nares normal, septum midline, mucosa normal, no drainage    or sinus tenderness   Throat:   Lips, mucosa, and tongue normal; teeth and gums normal   Neck:   Supple, symmetrical, trachea midline, no adenopathy;     thyroid:  no enlargement/tenderness/nodules; no carotid    bruit or JVD   Back:     Symmetric, no curvature, ROM normal, no CVA tenderness   Lungs:     Clear to auscultation bilaterally, respirations unlabored   Chest Wall:    No tenderness or deformity    Heart:    Regular rate and rhythm, S1 and S2 normal, no murmur, rub   or gallop, normal carotid impulse bilaterally without bruit.   Abdomen:     Soft, non-tender, bowel sounds active all four quadrants,     no masses, no organomegaly   Extremities:   Extremities normal, atraumatic, no cyanosis or edema   Pulses:   2+ and symmetric all extremities   Skin:   Skin color, texture, turgor normal, no rashes or lesions   Lymph nodes:   Cervical, supraclavicular, and axillary nodes normal   Neurologic:   normal strength, sensation intact     throughout       Diagnostic Data:  Procedures  Lab Results   Component Value Date    CHLPL 181 07/06/2016    TRIG 52 08/02/2019    HDL 53 08/02/2019     Lab Results   Component Value Date    GLUCOSE 92 08/02/2019    BUN 13 08/02/2019    CREATININE 0.68 08/02/2019     08/02/2019    K 4.6 08/02/2019     08/02/2019    CO2 26.4 08/02/2019     Lab Results   Component Value Date    HGBA1C 5.60 08/10/2018     Lab Results "   Component Value Date    WBC 6.50 08/02/2019    HGB 13.8 08/02/2019    HCT 44.8 08/02/2019     08/02/2019       PROBLEM LIST:  Patient Active Problem List   Diagnosis   • Primary osteoarthritis involving multiple joints   • GERD without esophagitis   • Allergic rhinitis due to pollen   • Muscle spasm of both lower legs   • Vitamin D deficiency   • Precordial pain   • Unstable angina (CMS/HCC)       PAST MEDICAL HX  Past Medical History:   Diagnosis Date   • Allergic    • GERD (gastroesophageal reflux disease)        Allergies  Allergies   Allergen Reactions   • Compazine [Prochlorperazine] Myalgia   • Morphine Other (See Comments)     Chest pain       Current Medications    Current Outpatient Medications:   •  cholecalciferol (VITAMIN D3) 1000 units tablet, Take 1,000 Units by mouth Daily., Disp: , Rfl:   •  levocetirizine (XYZAL) 5 MG tablet, Take 1 tablet by mouth Every Evening., Disp: 90 tablet, Rfl: 3  •  meclizine (ANTIVERT) 25 MG tablet, Take 1 tablet by mouth 3 (Three) Times a Day As Needed for dizziness., Disp: 90 tablet, Rfl: 0  •  nitroglycerin (NITROSTAT) 0.4 MG SL tablet, Place 1 tablet under the tongue Every 5 (Five) Minutes As Needed for Chest Pain. Take no more than 3 doses in 15 minutes., Disp: 30 tablet, Rfl: 5  •  pantoprazole (PROTONIX) 40 MG EC tablet, Take 1 tablet by mouth Daily., Disp: 90 tablet, Rfl: 3  •  tiZANidine (ZANAFLEX) 4 MG tablet, Take 1 tablet by mouth At Night As Needed for Muscle Spasms., Disp: 90 tablet, Rfl: 3  •  vitamin B-12 (CYANOCOBALAMIN) 1000 MCG tablet, Take 1,000 mcg by mouth Daily., Disp: , Rfl:   •  vitamin C (ASCORBIC ACID) 250 MG tablet, Take 250 mg by mouth Daily., Disp: , Rfl:   •  amLODIPine (NORVASC) 2.5 MG tablet, Take 1 tablet by mouth Daily., Disp: 30 tablet, Rfl: 11  •  clopidogrel (PLAVIX) 75 MG tablet, Take 1 tablet by mouth Daily., Disp: 30 tablet, Rfl: 3         ROS  Review of Systems   Constitution: Positive for malaise/fatigue.    Musculoskeletal: Positive for arthritis and muscle weakness.   Gastrointestinal: Positive for heartburn.       All other body systems reviewed and are negative    SOCIAL HX  Social History     Socioeconomic History   • Marital status:      Spouse name: moe   • Number of children: 1   • Years of education: 18   • Highest education level: Not on file   Occupational History   • Occupation: office inspected general   Social Needs   • Financial resource strain: Not very hard   • Food insecurity:     Worry: Never true     Inability: Never true   • Transportation needs:     Medical: No     Non-medical: No   Tobacco Use   • Smoking status: Former Smoker     Types: Cigarettes     Last attempt to quit: 2007     Years since quittin.0   • Smokeless tobacco: Never Used   Substance and Sexual Activity   • Alcohol use: No   • Drug use: No   • Sexual activity: Defer       FAMILY HX  Family History   Problem Relation Age of Onset   • Cancer Mother    • Hypertension Father    • No Known Problems Brother    • Breast cancer Neg Hx              Torsten Sandoval III, MD, FACC

## 2019-09-27 ENCOUNTER — HOSPITAL ENCOUNTER (OUTPATIENT)
Facility: HOSPITAL | Age: 59
Setting detail: HOSPITAL OUTPATIENT SURGERY
Discharge: HOME | End: 2019-09-27
Attending: INTERNAL MEDICINE | Admitting: INTERNAL MEDICINE

## 2019-09-27 VITALS
RESPIRATION RATE: 18 BRPM | SYSTOLIC BLOOD PRESSURE: 130 MMHG | BODY MASS INDEX: 35.54 KG/M2 | HEIGHT: 64 IN | WEIGHT: 208.19 LBS | OXYGEN SATURATION: 95 % | DIASTOLIC BLOOD PRESSURE: 72 MMHG | HEART RATE: 76 BPM

## 2019-09-27 DIAGNOSIS — I20.0 UNSTABLE ANGINA (HCC): ICD-10-CM

## 2019-09-27 LAB
ALBUMIN SERPL-MCNC: 4.5 G/DL (ref 3.5–5.2)
ALBUMIN/GLOB SERPL: 1.6 G/DL
ALP SERPL-CCNC: 79 U/L (ref 39–117)
ALT SERPL W P-5'-P-CCNC: 21 U/L (ref 1–33)
ANION GAP SERPL CALCULATED.3IONS-SCNC: 11 MMOL/L (ref 5–15)
AST SERPL-CCNC: 25 U/L (ref 1–32)
BILIRUB SERPL-MCNC: 0.5 MG/DL (ref 0.2–1.2)
BUN BLD-MCNC: 10 MG/DL (ref 6–20)
BUN/CREAT SERPL: 15.4 (ref 7–25)
CALCIUM SPEC-SCNC: 9.5 MG/DL (ref 8.6–10.5)
CHLORIDE SERPL-SCNC: 103 MMOL/L (ref 98–107)
CHOLEST SERPL-MCNC: 172 MG/DL (ref 0–200)
CO2 SERPL-SCNC: 29 MMOL/L (ref 22–29)
CREAT BLD-MCNC: 0.65 MG/DL (ref 0.57–1)
DEPRECATED RDW RBC AUTO: 42.2 FL (ref 37–54)
ERYTHROCYTE [DISTWIDTH] IN BLOOD BY AUTOMATED COUNT: 13.4 % (ref 12.3–15.4)
GFR SERPL CREATININE-BSD FRML MDRD: 94 ML/MIN/1.73
GLOBULIN UR ELPH-MCNC: 2.9 GM/DL
GLUCOSE BLD-MCNC: 99 MG/DL (ref 65–99)
HBA1C MFR BLD: 5.3 % (ref 4.8–5.6)
HCT VFR BLD AUTO: 40 % (ref 34–46.6)
HDLC SERPL-MCNC: 53 MG/DL (ref 40–60)
HGB BLD-MCNC: 12.6 G/DL (ref 12–15.9)
LDLC SERPL CALC-MCNC: 97 MG/DL (ref 0–100)
LDLC/HDLC SERPL: 1.83 {RATIO}
MCH RBC QN AUTO: 27.2 PG (ref 26.6–33)
MCHC RBC AUTO-ENTMCNC: 31.5 G/DL (ref 31.5–35.7)
MCV RBC AUTO: 86.4 FL (ref 79–97)
PLATELET # BLD AUTO: 208 10*3/MM3 (ref 140–450)
PMV BLD AUTO: 10.2 FL (ref 6–12)
POTASSIUM BLD-SCNC: 4.1 MMOL/L (ref 3.5–5.2)
PROT SERPL-MCNC: 7.4 G/DL (ref 6–8.5)
RBC # BLD AUTO: 4.63 10*6/MM3 (ref 3.77–5.28)
SODIUM BLD-SCNC: 143 MMOL/L (ref 136–145)
TRIGL SERPL-MCNC: 110 MG/DL (ref 0–150)
VLDLC SERPL-MCNC: 22 MG/DL
WBC NRBC COR # BLD: 5.61 10*3/MM3 (ref 3.4–10.8)

## 2019-09-27 PROCEDURE — 85027 COMPLETE CBC AUTOMATED: CPT | Performed by: INTERNAL MEDICINE

## 2019-09-27 PROCEDURE — 93458 L HRT ARTERY/VENTRICLE ANGIO: CPT | Performed by: INTERNAL MEDICINE

## 2019-09-27 PROCEDURE — 36415 COLL VENOUS BLD VENIPUNCTURE: CPT

## 2019-09-27 PROCEDURE — 25010000002 MIDAZOLAM PER 1 MG: Performed by: INTERNAL MEDICINE

## 2019-09-27 PROCEDURE — 83036 HEMOGLOBIN GLYCOSYLATED A1C: CPT | Performed by: PHYSICIAN ASSISTANT

## 2019-09-27 PROCEDURE — 25010000002 FENTANYL CITRATE (PF) 100 MCG/2ML SOLUTION: Performed by: INTERNAL MEDICINE

## 2019-09-27 PROCEDURE — C1894 INTRO/SHEATH, NON-LASER: HCPCS | Performed by: INTERNAL MEDICINE

## 2019-09-27 PROCEDURE — 80061 LIPID PANEL: CPT | Performed by: PHYSICIAN ASSISTANT

## 2019-09-27 PROCEDURE — 80053 COMPREHEN METABOLIC PANEL: CPT | Performed by: INTERNAL MEDICINE

## 2019-09-27 PROCEDURE — 0 IOPAMIDOL PER 1 ML: Performed by: INTERNAL MEDICINE

## 2019-09-27 PROCEDURE — C1769 GUIDE WIRE: HCPCS | Performed by: INTERNAL MEDICINE

## 2019-09-27 PROCEDURE — 25010000003 LIDOCAINE 1 % SOLUTION: Performed by: INTERNAL MEDICINE

## 2019-09-27 RX ORDER — SODIUM CHLORIDE 0.9 % (FLUSH) 0.9 %
3 SYRINGE (ML) INJECTION EVERY 12 HOURS SCHEDULED
Status: DISCONTINUED | OUTPATIENT
Start: 2019-09-27 | End: 2019-09-27 | Stop reason: HOSPADM

## 2019-09-27 RX ORDER — LIDOCAINE HYDROCHLORIDE 10 MG/ML
INJECTION, SOLUTION INFILTRATION; PERINEURAL AS NEEDED
Status: DISCONTINUED | OUTPATIENT
Start: 2019-09-27 | End: 2019-09-27 | Stop reason: HOSPADM

## 2019-09-27 RX ORDER — MIDAZOLAM HYDROCHLORIDE 1 MG/ML
INJECTION INTRAMUSCULAR; INTRAVENOUS AS NEEDED
Status: DISCONTINUED | OUTPATIENT
Start: 2019-09-27 | End: 2019-09-27 | Stop reason: HOSPADM

## 2019-09-27 RX ORDER — ONDANSETRON 2 MG/ML
4 INJECTION INTRAMUSCULAR; INTRAVENOUS EVERY 8 HOURS PRN
Status: DISCONTINUED | OUTPATIENT
Start: 2019-09-27 | End: 2019-09-27 | Stop reason: HOSPADM

## 2019-09-27 RX ORDER — ACETAMINOPHEN 325 MG/1
650 TABLET ORAL EVERY 6 HOURS PRN
Status: DISCONTINUED | OUTPATIENT
Start: 2019-09-27 | End: 2019-09-27 | Stop reason: HOSPADM

## 2019-09-27 RX ORDER — SODIUM CHLORIDE 9 MG/ML
1-3 INJECTION, SOLUTION INTRAVENOUS CONTINUOUS
Status: DISCONTINUED | OUTPATIENT
Start: 2019-09-27 | End: 2019-09-27 | Stop reason: HOSPADM

## 2019-09-27 RX ORDER — ASPIRIN 325 MG
325 TABLET, DELAYED RELEASE (ENTERIC COATED) ORAL DAILY
Status: DISCONTINUED | OUTPATIENT
Start: 2019-09-28 | End: 2019-09-27 | Stop reason: HOSPADM

## 2019-09-27 RX ORDER — FENTANYL CITRATE 50 UG/ML
INJECTION, SOLUTION INTRAMUSCULAR; INTRAVENOUS AS NEEDED
Status: DISCONTINUED | OUTPATIENT
Start: 2019-09-27 | End: 2019-09-27 | Stop reason: HOSPADM

## 2019-09-27 RX ORDER — NITROGLYCERIN 0.4 MG/1
0.4 TABLET SUBLINGUAL
Status: DISCONTINUED | OUTPATIENT
Start: 2019-09-27 | End: 2019-09-27 | Stop reason: HOSPADM

## 2019-09-27 RX ORDER — ASCORBIC ACID 500 MG
500 TABLET ORAL EVERY MORNING
COMMUNITY

## 2019-09-27 RX ORDER — SODIUM CHLORIDE 0.9 % (FLUSH) 0.9 %
10 SYRINGE (ML) INJECTION AS NEEDED
Status: DISCONTINUED | OUTPATIENT
Start: 2019-09-27 | End: 2019-09-27 | Stop reason: HOSPADM

## 2019-09-27 RX ORDER — ASPIRIN 325 MG
325 TABLET ORAL ONCE
Status: COMPLETED | OUTPATIENT
Start: 2019-09-27 | End: 2019-09-27

## 2019-09-27 RX ADMIN — ACETAMINOPHEN 650 MG: 325 TABLET ORAL at 11:25

## 2019-09-27 RX ADMIN — SODIUM CHLORIDE 3 ML/KG/HR: 9 INJECTION, SOLUTION INTRAVENOUS at 08:50

## 2019-09-27 RX ADMIN — ASPIRIN 325 MG ORAL TABLET 325 MG: 325 PILL ORAL at 09:03

## 2019-10-04 ENCOUNTER — OFFICE VISIT (OUTPATIENT)
Dept: FAMILY MEDICINE CLINIC | Facility: CLINIC | Age: 59
End: 2019-10-04

## 2019-10-04 VITALS
TEMPERATURE: 98.6 F | OXYGEN SATURATION: 96 % | HEIGHT: 64 IN | WEIGHT: 208 LBS | DIASTOLIC BLOOD PRESSURE: 76 MMHG | HEART RATE: 96 BPM | SYSTOLIC BLOOD PRESSURE: 130 MMHG | BODY MASS INDEX: 35.51 KG/M2

## 2019-10-04 DIAGNOSIS — I10 ESSENTIAL HYPERTENSION: ICD-10-CM

## 2019-10-04 DIAGNOSIS — E55.9 VITAMIN D DEFICIENCY: ICD-10-CM

## 2019-10-04 DIAGNOSIS — K21.9 GERD WITHOUT ESOPHAGITIS: ICD-10-CM

## 2019-10-04 DIAGNOSIS — J30.1 NON-SEASONAL ALLERGIC RHINITIS DUE TO POLLEN: ICD-10-CM

## 2019-10-04 DIAGNOSIS — R07.89 ATYPICAL CHEST PAIN: Primary | ICD-10-CM

## 2019-10-04 DIAGNOSIS — Z98.890 STATUS POST GASTRIC SURGERY: ICD-10-CM

## 2019-10-04 PROBLEM — I20.0 UNSTABLE ANGINA: Status: RESOLVED | Noted: 2019-09-23 | Resolved: 2019-10-04

## 2019-10-04 PROCEDURE — 90471 IMMUNIZATION ADMIN: CPT | Performed by: PHYSICIAN ASSISTANT

## 2019-10-04 PROCEDURE — 90674 CCIIV4 VAC NO PRSV 0.5 ML IM: CPT | Performed by: PHYSICIAN ASSISTANT

## 2019-10-04 PROCEDURE — 99214 OFFICE O/P EST MOD 30 MIN: CPT | Performed by: PHYSICIAN ASSISTANT

## 2019-10-06 PROBLEM — Z98.890 STATUS POST GASTRIC SURGERY: Status: ACTIVE | Noted: 2019-10-06

## 2019-10-06 NOTE — PROGRESS NOTES
Subjective   Jennifer Horne is a 59 y.o. female.       Chief Complaint -chest pain    History of Present Illness -      Chest pain-  She complains of mild to moderate pressure described as a full feeling in the mid chest and epigastric region with radiation to the back.  She was seen by cardiology recently and her cardiac catheterization was reviewed and unremarkable.  Not at goal    Status post gastric surgery-  Since she has had gastric surgery in the past we discussed the possibility that her fullness/pressure could be related to gastric issues.  She states that the bariatric surgeon has retired but she will try to contact that group and get reestablished for evaluation.    Allergic rhinitis-stable with Xyzal    Gastroesophageal reflux disease- currently seems stable with pantoprazole and listed chest pressure is related to GERD issues.    Vitamin D deficiency-stable with vitamin D supplementation    Hypertension-stable.  She states that she does feel very weak when she takes her Norvasc.      The following portions of the patient's history were reviewed and updated as appropriate: allergies, current medications, past family history, past medical history, past social history, past surgical history and problem list.    Review of Systems   Constitutional: Positive for fatigue. Negative for activity change, appetite change and fever.   HENT: Negative for ear pain, sinus pressure and sore throat.    Eyes: Negative for pain and visual disturbance.   Respiratory: Negative for cough and chest tightness.    Cardiovascular: Positive for chest pain. Negative for palpitations.   Gastrointestinal: Negative for abdominal pain, constipation, diarrhea, nausea and vomiting.   Endocrine: Negative for polydipsia and polyuria.   Genitourinary: Negative for dysuria and frequency.   Musculoskeletal: Negative for back pain and myalgias.   Skin: Negative for color change and rash.   Allergic/Immunologic: Negative for food allergies  "and immunocompromised state.   Neurological: Negative for dizziness, syncope and headaches.   Hematological: Negative for adenopathy. Does not bruise/bleed easily.   Psychiatric/Behavioral: Negative for hallucinations and suicidal ideas. The patient is not nervous/anxious.        /76   Pulse 96   Temp 98.6 °F (37 °C) (Oral)   Ht 162.6 cm (64.02\")   Wt 94.3 kg (208 lb)   SpO2 96%   BMI 35.69 kg/m²    Admission on 09/27/2019, Discharged on 09/27/2019   Component Date Value Ref Range Status   • Hemoglobin A1C 09/27/2019 5.30  4.80 - 5.60 % Final   • Total Cholesterol 09/27/2019 172  0 - 200 mg/dL Final   • Triglycerides 09/27/2019 110  0 - 150 mg/dL Final   • HDL Cholesterol 09/27/2019 53  40 - 60 mg/dL Final   • LDL Cholesterol  09/27/2019 97  0 - 100 mg/dL Final   • VLDL Cholesterol 09/27/2019 22  mg/dL Final   • LDL/HDL Ratio 09/27/2019 1.83   Final   • Glucose 09/27/2019 99  65 - 99 mg/dL Final   • BUN 09/27/2019 10  6 - 20 mg/dL Final   • Creatinine 09/27/2019 0.65  0.57 - 1.00 mg/dL Final   • Sodium 09/27/2019 143  136 - 145 mmol/L Final   • Potassium 09/27/2019 4.1  3.5 - 5.2 mmol/L Final   • Chloride 09/27/2019 103  98 - 107 mmol/L Final   • CO2 09/27/2019 29.0  22.0 - 29.0 mmol/L Final   • Calcium 09/27/2019 9.5  8.6 - 10.5 mg/dL Final   • Total Protein 09/27/2019 7.4  6.0 - 8.5 g/dL Final   • Albumin 09/27/2019 4.50  3.50 - 5.20 g/dL Final   • ALT (SGPT) 09/27/2019 21  1 - 33 U/L Final   • AST (SGOT) 09/27/2019 25  1 - 32 U/L Final   • Alkaline Phosphatase 09/27/2019 79  39 - 117 U/L Final   • Total Bilirubin 09/27/2019 0.5  0.2 - 1.2 mg/dL Final   • eGFR Non African Amer 09/27/2019 94  >60 mL/min/1.73 Final   • Globulin 09/27/2019 2.9  gm/dL Final   • A/G Ratio 09/27/2019 1.6  g/dL Final   • BUN/Creatinine Ratio 09/27/2019 15.4  7.0 - 25.0 Final   • Anion Gap 09/27/2019 11.0  5.0 - 15.0 mmol/L Final   • WBC 09/27/2019 5.61  3.40 - 10.80 10*3/mm3 Final   • RBC 09/27/2019 4.63  3.77 - 5.28 10*6/mm3 " Final   • Hemoglobin 09/27/2019 12.6  12.0 - 15.9 g/dL Final   • Hematocrit 09/27/2019 40.0  34.0 - 46.6 % Final   • MCV 09/27/2019 86.4  79.0 - 97.0 fL Final   • MCH 09/27/2019 27.2  26.6 - 33.0 pg Final   • MCHC 09/27/2019 31.5  31.5 - 35.7 g/dL Final   • RDW 09/27/2019 13.4  12.3 - 15.4 % Final   • RDW-SD 09/27/2019 42.2  37.0 - 54.0 fl Final   • MPV 09/27/2019 10.2  6.0 - 12.0 fL Final   • Platelets 09/27/2019 208  140 - 450 10*3/mm3 Final       Physical Exam   Constitutional: She is oriented to person, place, and time. She appears well-developed and well-nourished.   HENT:   Head: Normocephalic and atraumatic.   Nose: Nose normal.   Mouth/Throat: Oropharynx is clear and moist.   Eyes: Conjunctivae and EOM are normal. Pupils are equal, round, and reactive to light.   Neck: Normal range of motion. Neck supple. No tracheal deviation present. No thyromegaly present.   Cardiovascular: Normal rate, regular rhythm, normal heart sounds and intact distal pulses.   No murmur heard.  Pulmonary/Chest: Effort normal and breath sounds normal. No respiratory distress. She has no wheezes.   Abdominal: Soft. Bowel sounds are normal. There is no tenderness. There is no guarding.   Musculoskeletal: Normal range of motion. She exhibits no edema or tenderness.   Lymphadenopathy:     She has no cervical adenopathy.   Neurological: She is alert and oriented to person, place, and time.   Skin: Skin is warm and dry. No rash noted.   Psychiatric: She has a normal mood and affect. Her behavior is normal.   Nursing note and vitals reviewed.      Assessment/Plan     Diagnoses and all orders for this visit:    Atypical chest pain  Comments:  Cardiac cath normal 9/27/19  discontinue norvasc  start gas x prn  Orders:  -     CT Chest With & Without Contrast; Future    Non-seasonal allergic rhinitis due to pollen  Comments:  Continue Xyzal    GERD without esophagitis  Comments:  Continue pantoprazole    Vitamin D deficiency  Comments:  Continue  vitamin D supplementation    Status post gastric surgery  Comments:  Advised patient to contact her bariatric surgeon to see if pressure is related to gastric issues    Essential hypertension  Comments:  Discontinue Norvasc due to hypotension and weakness    Other orders  -     Flucelvax Quad=>4Years (PFS)                       This document has been electronically signed by:  Aisha Coffman PA-C

## 2019-10-12 ENCOUNTER — OFFICE VISIT (OUTPATIENT)
Dept: FAMILY MEDICINE CLINIC | Facility: CLINIC | Age: 59
End: 2019-10-12

## 2019-10-12 VITALS
WEIGHT: 209 LBS | OXYGEN SATURATION: 98 % | HEART RATE: 84 BPM | SYSTOLIC BLOOD PRESSURE: 128 MMHG | TEMPERATURE: 98.3 F | DIASTOLIC BLOOD PRESSURE: 76 MMHG | RESPIRATION RATE: 14 BRPM | HEIGHT: 64 IN | BODY MASS INDEX: 35.68 KG/M2

## 2019-10-12 DIAGNOSIS — J40 BRONCHITIS: ICD-10-CM

## 2019-10-12 DIAGNOSIS — J01.10 ACUTE NON-RECURRENT FRONTAL SINUSITIS: Primary | ICD-10-CM

## 2019-10-12 PROCEDURE — 99214 OFFICE O/P EST MOD 30 MIN: CPT | Performed by: NURSE PRACTITIONER

## 2019-10-12 PROCEDURE — 96372 THER/PROPH/DIAG INJ SC/IM: CPT | Performed by: NURSE PRACTITIONER

## 2019-10-12 RX ORDER — DEXTROMETHORPHAN HYDROBROMIDE AND PROMETHAZINE HYDROCHLORIDE 15; 6.25 MG/5ML; MG/5ML
5 SYRUP ORAL 2 TIMES DAILY PRN
Qty: 180 ML | Refills: 0 | Status: SHIPPED | OUTPATIENT
Start: 2019-10-12 | End: 2020-01-10

## 2019-10-12 RX ORDER — ALBUTEROL SULFATE 90 UG/1
2 AEROSOL, METERED RESPIRATORY (INHALATION) EVERY 6 HOURS PRN
Qty: 1 INHALER | Refills: 1 | Status: SHIPPED | OUTPATIENT
Start: 2019-10-12 | End: 2020-04-03 | Stop reason: SDUPTHER

## 2019-10-12 RX ORDER — DOXYCYCLINE HYCLATE 100 MG/1
100 CAPSULE ORAL 2 TIMES DAILY
Qty: 20 CAPSULE | Refills: 0 | Status: SHIPPED | OUTPATIENT
Start: 2019-10-12 | End: 2019-10-22

## 2019-10-12 RX ORDER — METHYLPREDNISOLONE ACETATE 80 MG/ML
80 INJECTION, SUSPENSION INTRA-ARTICULAR; INTRALESIONAL; INTRAMUSCULAR; SOFT TISSUE ONCE
Status: COMPLETED | OUTPATIENT
Start: 2019-10-12 | End: 2019-10-12

## 2019-10-12 RX ORDER — BENZONATATE 200 MG/1
200 CAPSULE ORAL 3 TIMES DAILY PRN
Qty: 25 CAPSULE | Refills: 0 | Status: SHIPPED | OUTPATIENT
Start: 2019-10-12 | End: 2020-01-10

## 2019-10-12 RX ORDER — FLUCONAZOLE 150 MG/1
150 TABLET ORAL ONCE
Qty: 1 TABLET | Refills: 0 | Status: SHIPPED | OUTPATIENT
Start: 2019-10-12 | End: 2019-10-12

## 2019-10-12 RX ADMIN — METHYLPREDNISOLONE ACETATE 80 MG: 80 INJECTION, SUSPENSION INTRA-ARTICULAR; INTRALESIONAL; INTRAMUSCULAR; SOFT TISSUE at 10:29

## 2019-10-12 NOTE — PATIENT INSTRUCTIONS
Acute Bronchitis, Adult  Acute bronchitis is when air tubes (bronchi) in the lungs suddenly get swollen. The condition can make it hard to breathe. It can also cause these symptoms:  · A cough.  · Coughing up clear, yellow, or green mucus.  · Wheezing.  · Chest congestion.  · Shortness of breath.  · A fever.  · Body aches.  · Chills.  · A sore throat.  Follow these instructions at home:    Medicines  · Take over-the-counter and prescription medicines only as told by your doctor.  · If you were prescribed an antibiotic medicine, take it as told by your doctor. Do not stop taking the antibiotic even if you start to feel better.  General instructions  · Rest.  · Drink enough fluids to keep your pee (urine) pale yellow.  · Avoid smoking and secondhand smoke. If you smoke and you need help quitting, ask your doctor. Quitting will help your lungs heal faster.  · Use an inhaler, cool mist vaporizer, or humidifier as told by your doctor.  · Keep all follow-up visits as told by your doctor. This is important.  How is this prevented?  To lower your risk of getting this condition again:  · Wash your hands often with soap and water. If you cannot use soap and water, use hand .  · Avoid contact with people who have cold symptoms.  · Try not to touch your hands to your mouth, nose, or eyes.  · Make sure to get the flu shot every year.  Contact a doctor if:  · Your symptoms do not get better in 2 weeks.  Get help right away if:  · You cough up blood.  · You have chest pain.  · You have very bad shortness of breath.  · You become dehydrated.  · You faint (pass out) or keep feeling like you are going to pass out.  · You keep throwing up (vomiting).  · You have a very bad headache.  · Your fever or chills gets worse.  This information is not intended to replace advice given to you by your health care provider. Make sure you discuss any questions you have with your health care provider.  Document Released: 06/05/2009 Document  Revised: 08/01/2018 Document Reviewed: 06/07/2017  [employer] Interactive Patient Education © 2019 [employer]  Sinusitis, Adult  Sinusitis is soreness and swelling (inflammation) of your sinuses. Sinuses are hollow spaces in the bones around your face. They are located:  · Around your eyes.  · In the middle of your forehead.  · Behind your nose.  · In your cheekbones.  Your sinuses and nasal passages are lined with a stringy fluid (mucus). Mucus normally drains out of your sinuses. Swelling can trap mucus in your sinuses. This lets germs like bacteria or viruses grow, and that leads to infection. Most of the time, sinusitis is caused by a virus.  If bacteria is causing your infection, your doctor may have you wait and see if you get better without antibiotic medicine. You may be prescribed antibiotics if you have:  · A very bad infection.  · A weak disease-fighting (immune) system.  Follow these instructions at home:  Medicines  · Take, use, or apply over-the-counter and prescription medicines only as told by your doctor. These may include nasal sprays.  · If you were prescribed an antibiotic, take it as told by your doctor. Do not stop taking the antibiotic even if you start to feel better.  Hydrate and Humidify  · Drink enough water to keep your pee (urine) pale yellow.  · Use a cool mist humidifier to keep the humidity level in your home above 50%.  · Breathe in steam for 10-15 minutes, 3-4 times a day or as told by your doctor. You can do this in the bathroom while a hot shower is running.  · Try not to spend time in cool or dry air.  Rest  · Rest as much as possible.  · Sleep with your head raised (elevated).  · Make sure to get enough sleep each night.  General instructions  · Put a warm, moist washcloth on your face 3-4 times a day, or as often as told by your doctor. This will help with discomfort.  · Wash your hands often with soap and water. If there is no soap and water, use hand .  · Do not  smoke. Avoid being around people who are smoking (secondhand smoke).  · Keep all follow-up visits as told by your doctor. This is important.  Contact a doctor if:  · You have a fever.  · Your symptoms get worse.  · Your symptoms do not get better within 10 days.  Get help right away if:  · You have a very bad headache.  · You cannot stop throwing up (vomiting).  · You have pain or swelling around your face or eyes.  · You have trouble seeing.  · You feel confused.  · Your neck is stiff.  · You have trouble breathing.  This information is not intended to replace advice given to you by your health care provider. Make sure you discuss any questions you have with your health care provider.  Document Released: 06/05/2009 Document Revised: 06/29/2018 Document Reviewed: 10/12/2016  [employer] Interactive Patient Education © 2019 [employer]

## 2019-10-12 NOTE — PROGRESS NOTES
Jennifer Horne is a 59 y.o. female who presents to the clinic today c/o upper respiratory symptoms  which started over a week ago.  Associated symptoms include sinus pressure/pain especially in the frontal region, cough with associated wheezing, congestion, fatigue and worsening symptoms. She has tried Flonase, otc Robitussin DM. Last night, she could not sleep due to the cough and she tried her 's nebulizer which provided some relief.     URI    The current episode started 1 to 4 weeks ago. The problem has been gradually worsening. There has been no fever. Associated symptoms include congestion, coughing, headaches, rhinorrhea, sinus pain and wheezing. Pertinent negatives include no chest pain, ear pain, nausea, rash, sneezing, sore throat, swollen glands or vomiting. She has tried antihistamine (Flonase/ Neb treatment) for the symptoms. The treatment provided mild relief.   Refer to ROS for additional information.    The following portions of the patient's history were reviewed and updated as appropriate: allergies, current medications, past family history, past medical history, past social history, past surgical history and problem list.    Current Outpatient Medications:   •  cholecalciferol (VITAMIN D3) 1000 units tablet, Take 1,000 Units by mouth Every Morning., Disp: , Rfl:   •  Docusate Calcium (STOOL SOFTENER PO), Take 1 tablet by mouth Every Morning., Disp: , Rfl:   •  levocetirizine (XYZAL) 5 MG tablet, Take 1 tablet by mouth Every Evening. (Patient taking differently: Take 5 mg by mouth Every Morning.), Disp: 90 tablet, Rfl: 3  •  meclizine (ANTIVERT) 25 MG tablet, Take 1 tablet by mouth 3 (Three) Times a Day As Needed for dizziness., Disp: 90 tablet, Rfl: 0  •  Multiple Vitamins-Minerals (MULTIVITAMIN ADULT PO), Take 1 tablet by mouth Every Morning., Disp: , Rfl:   •  nitroglycerin (NITROSTAT) 0.4 MG SL tablet, Place 1 tablet under the tongue Every 5 (Five) Minutes As Needed for Chest Pain.  Take no more than 3 doses in 15 minutes., Disp: 30 tablet, Rfl: 5  •  pantoprazole (PROTONIX) 40 MG EC tablet, Take 1 tablet by mouth Daily. (Patient taking differently: Take 40 mg by mouth Every Morning.), Disp: 90 tablet, Rfl: 3  •  tiZANidine (ZANAFLEX) 4 MG tablet, Take 1 tablet by mouth At Night As Needed for Muscle Spasms., Disp: 90 tablet, Rfl: 3  •  vitamin B-12 (CYANOCOBALAMIN) 1000 MCG tablet, Take 1,000 mcg by mouth Every Morning., Disp: , Rfl:   •  vitamin C (ASCORBIC ACID) 500 MG tablet, Take 500 mg by mouth Every Morning., Disp: , Rfl:   •  albuterol sulfate  (90 Base) MCG/ACT inhaler, Inhale 2 puffs Every 6 (Six) Hours As Needed for Wheezing or Shortness of Air., Disp: 1 inhaler, Rfl: 1  •  benzonatate (TESSALON) 200 MG capsule, Take 1 capsule by mouth 3 (Three) Times a Day As Needed for Cough., Disp: 25 capsule, Rfl: 0  •  doxycycline (VIBRAMYCIN) 100 MG capsule, Take 1 capsule by mouth 2 (Two) Times a Day for 10 days., Disp: 20 capsule, Rfl: 0  •  fluconazole (DIFLUCAN) 150 MG tablet, Take 1 tablet by mouth 1 (One) Time for 1 dose., Disp: 1 tablet, Rfl: 0  •  promethazine-dextromethorphan (PROMETHAZINE-DM) 6.25-15 MG/5ML syrup, Take 5 mL by mouth 2 (Two) Times a Day As Needed for Cough., Disp: 180 mL, Rfl: 0  No current facility-administered medications for this visit.     Allergies   Allergen Reactions   • Compazine [Prochlorperazine] Myalgia     Muscle spasms specifically that resembled seizure activity   • Morphine Nausea And Vomiting and Other (See Comments)     Abdominal pains     Review of Systems   Constitutional: Positive for activity change, appetite change and fatigue. Negative for fever.   HENT: Positive for congestion, postnasal drip, rhinorrhea, sinus pressure and sinus pain. Negative for ear discharge, ear pain, sneezing, sore throat and trouble swallowing.    Eyes: Negative for discharge and redness.   Respiratory: Positive for cough, chest tightness, shortness of breath and  "wheezing.    Cardiovascular: Negative for chest pain.   Gastrointestinal: Negative for nausea and vomiting.   Skin: Negative for rash.   Neurological: Positive for headaches.   Hematological: Negative for adenopathy.   Psychiatric/Behavioral: Positive for sleep disturbance.   All other systems reviewed and are negative.    Visit Vitals  /76 (BP Location: Left arm, Patient Position: Sitting, Cuff Size: Adult)   Pulse 84   Temp 98.3 °F (36.8 °C) (Temporal)   Resp 14   Ht 162.6 cm (64\")   Wt 94.8 kg (209 lb)   SpO2 98%   BMI 35.87 kg/m²     Physical Exam   Constitutional: She is oriented to person, place, and time. She appears well-developed and well-nourished. No distress.   HENT:   Head: Normocephalic.   Right Ear: Ear canal normal. A middle ear effusion is present.   Left Ear: Ear canal normal. A middle ear effusion is present.   Nose: Mucosal edema and rhinorrhea present. Right sinus exhibits frontal sinus tenderness. Right sinus exhibits no maxillary sinus tenderness. Left sinus exhibits frontal sinus tenderness. Left sinus exhibits no maxillary sinus tenderness.   Mouth/Throat: Oropharynx is clear and moist and mucous membranes are normal. No oropharyngeal exudate.   Eyes: Conjunctivae and EOM are normal. Pupils are equal, round, and reactive to light. Right eye exhibits no discharge. Left eye exhibits no discharge. No scleral icterus.   Neck: Neck supple. No tracheal tenderness present.   Cardiovascular: Normal rate, regular rhythm and normal heart sounds. Exam reveals no friction rub.   No murmur heard.  Pulmonary/Chest: Effort normal. No respiratory distress. She has no decreased breath sounds. She has wheezes in the right upper field and the left upper field. She has rhonchi in the right upper field and the left upper field. She has no rales.   Musculoskeletal: She exhibits no edema.   Lymphadenopathy:        Head (right side): No tonsillar and no preauricular adenopathy present.        Head (left " side): No tonsillar and no preauricular adenopathy present.     She has no cervical adenopathy.   Neurological: She is alert and oriented to person, place, and time.   Skin: Skin is warm and dry. Capillary refill takes less than 2 seconds. No rash noted. No erythema.   Psychiatric: She has a normal mood and affect. Her behavior is normal. Thought content normal.   Vitals reviewed.    Assessment/Plan   Diagnoses and all orders for this visit:    Acute non-recurrent frontal sinusitis  -     doxycycline (VIBRAMYCIN) 100 MG capsule; Take 1 capsule by mouth 2 (Two) Times a Day for 10 days.    Bronchitis  -     methylPREDNISolone acetate (DEPO-medrol) injection 80 mg  -     promethazine-dextromethorphan (PROMETHAZINE-DM) 6.25-15 MG/5ML syrup; Take 5 mL by mouth 2 (Two) Times a Day As Needed for Cough.  -     benzonatate (TESSALON) 200 MG capsule; Take 1 capsule by mouth 3 (Three) Times a Day As Needed for Cough.  -     albuterol sulfate  (90 Base) MCG/ACT inhaler; Inhale 2 puffs Every 6 (Six) Hours As Needed for Wheezing or Shortness of Air.    Other orders  -     fluconazole (DIFLUCAN) 150 MG tablet; Take 1 tablet by mouth 1 (One) Time for 1 dose.    Findings and recommendations discussed with Jennifer. Treatment options reviewed. Counseled regarding supportive care measures. Encouraged her to seek further medical evaluation if symptoms worsen or do not improve within 48-72 hours.     This document has been electronically signed by KALLIE López, JERICHO-BC, SAMSON

## 2020-01-10 ENCOUNTER — OFFICE VISIT (OUTPATIENT)
Dept: FAMILY MEDICINE CLINIC | Facility: CLINIC | Age: 60
End: 2020-01-10

## 2020-01-10 VITALS
SYSTOLIC BLOOD PRESSURE: 134 MMHG | HEART RATE: 82 BPM | BODY MASS INDEX: 36.37 KG/M2 | HEIGHT: 64 IN | DIASTOLIC BLOOD PRESSURE: 80 MMHG | TEMPERATURE: 98.9 F | OXYGEN SATURATION: 97 % | WEIGHT: 213 LBS

## 2020-01-10 DIAGNOSIS — L20.84 INTRINSIC ECZEMA: Primary | ICD-10-CM

## 2020-01-10 DIAGNOSIS — M15.9 PRIMARY OSTEOARTHRITIS INVOLVING MULTIPLE JOINTS: ICD-10-CM

## 2020-01-10 DIAGNOSIS — G47.62 NOCTURNAL LEG CRAMPS: ICD-10-CM

## 2020-01-10 PROCEDURE — 99214 OFFICE O/P EST MOD 30 MIN: CPT | Performed by: PHYSICIAN ASSISTANT

## 2020-01-10 NOTE — PROGRESS NOTES
Subjective   Jennifer Horne is a 59 y.o. female.       Chief Complaint -skin lesion    History of Present Illness -      Skin lesion-  She complains of erythematous rough skin lesion on her left anterior thigh with associated pruritus.  Minimal relief with triamcinolone cream.  Onset 3 months.  She states that she gets similar lesions that will come and go on different parts of her body over the last few years.    Nocturnal leg cramps-  She complains of intermittent moderate to severe cramps in bilateral legs with associated muscle spasms.  Onset greater than 1 year.  She has had minimal relief in the past with the use of oral gabapentin, Requip, Mirapex, CBD oil, ibuprofen, Tylenol, and over-the-counter pain rubs.    Osteoarthritis-not at goal with complaints of joint pains in bilateral hands and knees.  Pain is described as intermittent moderate and achy.  Worse with joint movement and worse in the morning.  She is unable to take ibuprofen currently due to history of weight loss surgery.  Onset greater than 1 year.  No known injury.    The following portions of the patient's history were reviewed and updated as appropriate: allergies, current medications, past family history, past medical history, past social history, past surgical history and problem list.    Review of Systems   Constitutional: Negative for activity change, appetite change, fatigue and fever.   HENT: Negative for ear pain, sinus pressure and sore throat.    Eyes: Negative for pain and visual disturbance.   Respiratory: Negative for cough and chest tightness.    Cardiovascular: Negative for chest pain and palpitations.   Gastrointestinal: Negative for abdominal pain, constipation, diarrhea, nausea and vomiting.   Endocrine: Negative for polydipsia and polyuria.   Genitourinary: Negative for dysuria and frequency.   Musculoskeletal: Positive for arthralgias and myalgias. Negative for back pain.        Leg cramps   Skin: Positive for color  "change. Negative for rash.        Skin lesion left leg   Allergic/Immunologic: Negative for food allergies and immunocompromised state.   Neurological: Negative for dizziness, syncope and headaches.   Hematological: Negative for adenopathy. Does not bruise/bleed easily.   Psychiatric/Behavioral: Negative for hallucinations and suicidal ideas. The patient is not nervous/anxious.        /80   Pulse 82   Temp 98.9 °F (37.2 °C) (Oral)   Ht 162.6 cm (64.02\")   Wt 96.6 kg (213 lb)   SpO2 97%   BMI 36.54 kg/m²   Admission on 09/27/2019, Discharged on 09/27/2019   Component Date Value Ref Range Status   • Hemoglobin A1C 09/27/2019 5.30  4.80 - 5.60 % Final   • Total Cholesterol 09/27/2019 172  0 - 200 mg/dL Final   • Triglycerides 09/27/2019 110  0 - 150 mg/dL Final   • HDL Cholesterol 09/27/2019 53  40 - 60 mg/dL Final   • LDL Cholesterol  09/27/2019 97  0 - 100 mg/dL Final   • VLDL Cholesterol 09/27/2019 22  mg/dL Final   • LDL/HDL Ratio 09/27/2019 1.83   Final   • Glucose 09/27/2019 99  65 - 99 mg/dL Final   • BUN 09/27/2019 10  6 - 20 mg/dL Final   • Creatinine 09/27/2019 0.65  0.57 - 1.00 mg/dL Final   • Sodium 09/27/2019 143  136 - 145 mmol/L Final   • Potassium 09/27/2019 4.1  3.5 - 5.2 mmol/L Final   • Chloride 09/27/2019 103  98 - 107 mmol/L Final   • CO2 09/27/2019 29.0  22.0 - 29.0 mmol/L Final   • Calcium 09/27/2019 9.5  8.6 - 10.5 mg/dL Final   • Total Protein 09/27/2019 7.4  6.0 - 8.5 g/dL Final   • Albumin 09/27/2019 4.50  3.50 - 5.20 g/dL Final   • ALT (SGPT) 09/27/2019 21  1 - 33 U/L Final   • AST (SGOT) 09/27/2019 25  1 - 32 U/L Final   • Alkaline Phosphatase 09/27/2019 79  39 - 117 U/L Final   • Total Bilirubin 09/27/2019 0.5  0.2 - 1.2 mg/dL Final   • eGFR Non African Amer 09/27/2019 94  >60 mL/min/1.73 Final   • Globulin 09/27/2019 2.9  gm/dL Final   • A/G Ratio 09/27/2019 1.6  g/dL Final   • BUN/Creatinine Ratio 09/27/2019 15.4  7.0 - 25.0 Final   • Anion Gap 09/27/2019 11.0  5.0 - 15.0 " mmol/L Final   • WBC 09/27/2019 5.61  3.40 - 10.80 10*3/mm3 Final   • RBC 09/27/2019 4.63  3.77 - 5.28 10*6/mm3 Final   • Hemoglobin 09/27/2019 12.6  12.0 - 15.9 g/dL Final   • Hematocrit 09/27/2019 40.0  34.0 - 46.6 % Final   • MCV 09/27/2019 86.4  79.0 - 97.0 fL Final   • MCH 09/27/2019 27.2  26.6 - 33.0 pg Final   • MCHC 09/27/2019 31.5  31.5 - 35.7 g/dL Final   • RDW 09/27/2019 13.4  12.3 - 15.4 % Final   • RDW-SD 09/27/2019 42.2  37.0 - 54.0 fl Final   • MPV 09/27/2019 10.2  6.0 - 12.0 fL Final   • Platelets 09/27/2019 208  140 - 450 10*3/mm3 Final       Physical Exam   Constitutional: She is oriented to person, place, and time. She appears well-developed and well-nourished.   HENT:   Head: Normocephalic and atraumatic.   Nose: Nose normal.   Mouth/Throat: Oropharynx is clear and moist.   Eyes: Pupils are equal, round, and reactive to light. Conjunctivae and EOM are normal.   Neck: Normal range of motion. Neck supple. No tracheal deviation present. No thyromegaly present.   Cardiovascular: Normal rate, regular rhythm, normal heart sounds and intact distal pulses.   No murmur heard.  Pulmonary/Chest: Effort normal and breath sounds normal. No respiratory distress. She has no wheezes.   Abdominal: Soft. Bowel sounds are normal. There is no tenderness. There is no guarding.   Musculoskeletal: Normal range of motion. She exhibits tenderness. She exhibits no edema.   Tenderness noted with joint manipulation opening and closing her hand with an enlargement noted at the PIP joint of thumb   Lymphadenopathy:     She has no cervical adenopathy.   Neurological: She is alert and oriented to person, place, and time.   Skin: Skin is warm and dry. No rash noted.   Erythematous macule approximately 1 x 1 cm with scaling noted anterior left thigh   Psychiatric: She has a normal mood and affect. Her behavior is normal.   Nursing note and vitals reviewed.      Assessment/Plan     Diagnoses and all orders for this  visit:    Intrinsic eczema  Comments:  Start Eucrisa  Patient was educated on eczema  Orders:  -     Crisaborole (EUCRISA) 2 % ointment; Apply 5 g topically Daily As Needed (eczema).    Nocturnal leg cramps  Comments:  start verapamil (CCB)  and rx compounded cream prn  Orders:  -     verapamil SR (CALAN-SR) 120 MG CR tablet; Take 1 tablet by mouth Every Night.    Primary osteoarthritis involving multiple joints  Comments:  Prescription written for transdermal compounded pain cream from [facility]            This document has been electronically signed by:  Aisha Coffman PA-C

## 2020-02-24 RX ORDER — FLUTICASONE PROPIONATE 50 MCG
SPRAY, SUSPENSION (ML) NASAL
Qty: 1 BOTTLE | Refills: 4 | Status: SHIPPED | OUTPATIENT
Start: 2020-02-24 | End: 2020-04-03 | Stop reason: SDUPTHER

## 2020-03-09 ENCOUNTER — OFFICE VISIT (OUTPATIENT)
Dept: FAMILY MEDICINE CLINIC | Facility: CLINIC | Age: 60
End: 2020-03-09

## 2020-03-09 VITALS
TEMPERATURE: 97.5 F | OXYGEN SATURATION: 98 % | HEIGHT: 64 IN | SYSTOLIC BLOOD PRESSURE: 144 MMHG | DIASTOLIC BLOOD PRESSURE: 80 MMHG | BODY MASS INDEX: 37.9 KG/M2 | WEIGHT: 222 LBS | HEART RATE: 72 BPM

## 2020-03-09 DIAGNOSIS — K21.9 GERD WITHOUT ESOPHAGITIS: ICD-10-CM

## 2020-03-09 DIAGNOSIS — M79.601 ARM PAIN, ANTERIOR, RIGHT: Primary | ICD-10-CM

## 2020-03-09 DIAGNOSIS — B02.23 POST-HERPETIC POLYNEUROPATHY: ICD-10-CM

## 2020-03-09 DIAGNOSIS — M15.9 PRIMARY OSTEOARTHRITIS INVOLVING MULTIPLE JOINTS: ICD-10-CM

## 2020-03-09 DIAGNOSIS — B02.23 POST-HERPETIC POLYNEUROPATHY: Primary | ICD-10-CM

## 2020-03-09 DIAGNOSIS — G47.62 SLEEP RELATED LEG CRAMPS: ICD-10-CM

## 2020-03-09 PROCEDURE — 96372 THER/PROPH/DIAG INJ SC/IM: CPT | Performed by: PHYSICIAN ASSISTANT

## 2020-03-09 PROCEDURE — 99214 OFFICE O/P EST MOD 30 MIN: CPT | Performed by: PHYSICIAN ASSISTANT

## 2020-03-09 RX ORDER — VALACYCLOVIR HYDROCHLORIDE 1 G/1
1000 TABLET, FILM COATED ORAL 3 TIMES DAILY
Qty: 21 TABLET | Refills: 0 | Status: SHIPPED | OUTPATIENT
Start: 2020-03-09 | End: 2020-07-17

## 2020-03-09 RX ORDER — KETOROLAC TROMETHAMINE 30 MG/ML
60 INJECTION, SOLUTION INTRAMUSCULAR; INTRAVENOUS ONCE
Status: COMPLETED | OUTPATIENT
Start: 2020-03-09 | End: 2020-03-09

## 2020-03-09 RX ADMIN — KETOROLAC TROMETHAMINE 60 MG: 30 INJECTION, SOLUTION INTRAMUSCULAR; INTRAVENOUS at 11:33

## 2020-03-11 RX ORDER — GABAPENTIN 300 MG/1
300 CAPSULE ORAL 3 TIMES DAILY
Qty: 90 CAPSULE | Refills: 0 | Status: SHIPPED | OUTPATIENT
Start: 2020-03-11 | End: 2020-05-04 | Stop reason: SDUPTHER

## 2020-03-11 NOTE — PROGRESS NOTES
Subjective   Jennifer Horne is a 60 y.o. female.       Chief Complaint -arm pain    History of Present Illness -      Arm pain-  She complains of sudden onset of moderate throbbing pain in her right arm with associated tenderness to anterior right forearm palpation that began approximately 6 days ago.  Minimal relief with Tylenol or heat.  She reports some relief with gabapentin which she had leftover from previous shingles infection.  No known new injury.    Rash-  She complains of a rash in the left upper quadrant of the abdomen and left flank area that has associated burning and tenderness.  Sudden onset 2 days ago.    Gastroesophageal reflux disease-stable with pantoprazole    Chronic leg cramps- stable with Zanaflex and verapamil    Osteoarthritis-stable with Tylenol PRN.  She is not a candidate for NSAIDs orally due to gastricSurgery.    The following portions of the patient's history were reviewed and updated as appropriate: allergies, current medications, past family history, past medical history, past social history, past surgical history and problem list.    Review of Systems   Constitutional: Negative for activity change, appetite change, fatigue and fever.   HENT: Negative for ear pain, sinus pressure and sore throat.    Eyes: Negative for pain and visual disturbance.   Respiratory: Negative for cough and chest tightness.    Cardiovascular: Negative for chest pain and palpitations.   Gastrointestinal: Negative for abdominal pain, constipation, diarrhea, nausea and vomiting.   Endocrine: Negative for polydipsia and polyuria.   Genitourinary: Negative for dysuria and frequency.   Musculoskeletal: Positive for arthralgias and myalgias. Negative for back pain.        Arm pain   Skin: Positive for rash. Negative for color change.   Allergic/Immunologic: Negative for food allergies and immunocompromised state.   Neurological: Negative for dizziness, syncope and headaches.   Hematological: Negative for  "adenopathy. Does not bruise/bleed easily.   Psychiatric/Behavioral: Negative for hallucinations and suicidal ideas. The patient is not nervous/anxious.        /80   Pulse 72   Temp 97.5 °F (36.4 °C) (Oral)   Ht 162.6 cm (64.02\")   Wt 101 kg (222 lb)   SpO2 98%   BMI 38.09 kg/m²   Admission on 09/27/2019, Discharged on 09/27/2019   Component Date Value Ref Range Status   • Hemoglobin A1C 09/27/2019 5.30  4.80 - 5.60 % Final   • Total Cholesterol 09/27/2019 172  0 - 200 mg/dL Final   • Triglycerides 09/27/2019 110  0 - 150 mg/dL Final   • HDL Cholesterol 09/27/2019 53  40 - 60 mg/dL Final   • LDL Cholesterol  09/27/2019 97  0 - 100 mg/dL Final   • VLDL Cholesterol 09/27/2019 22  mg/dL Final   • LDL/HDL Ratio 09/27/2019 1.83   Final   • Glucose 09/27/2019 99  65 - 99 mg/dL Final   • BUN 09/27/2019 10  6 - 20 mg/dL Final   • Creatinine 09/27/2019 0.65  0.57 - 1.00 mg/dL Final   • Sodium 09/27/2019 143  136 - 145 mmol/L Final   • Potassium 09/27/2019 4.1  3.5 - 5.2 mmol/L Final   • Chloride 09/27/2019 103  98 - 107 mmol/L Final   • CO2 09/27/2019 29.0  22.0 - 29.0 mmol/L Final   • Calcium 09/27/2019 9.5  8.6 - 10.5 mg/dL Final   • Total Protein 09/27/2019 7.4  6.0 - 8.5 g/dL Final   • Albumin 09/27/2019 4.50  3.50 - 5.20 g/dL Final   • ALT (SGPT) 09/27/2019 21  1 - 33 U/L Final   • AST (SGOT) 09/27/2019 25  1 - 32 U/L Final   • Alkaline Phosphatase 09/27/2019 79  39 - 117 U/L Final   • Total Bilirubin 09/27/2019 0.5  0.2 - 1.2 mg/dL Final   • eGFR Non African Amer 09/27/2019 94  >60 mL/min/1.73 Final   • Globulin 09/27/2019 2.9  gm/dL Final   • A/G Ratio 09/27/2019 1.6  g/dL Final   • BUN/Creatinine Ratio 09/27/2019 15.4  7.0 - 25.0 Final   • Anion Gap 09/27/2019 11.0  5.0 - 15.0 mmol/L Final   • WBC 09/27/2019 5.61  3.40 - 10.80 10*3/mm3 Final   • RBC 09/27/2019 4.63  3.77 - 5.28 10*6/mm3 Final   • Hemoglobin 09/27/2019 12.6  12.0 - 15.9 g/dL Final   • Hematocrit 09/27/2019 40.0  34.0 - 46.6 % Final   • MCV " 09/27/2019 86.4  79.0 - 97.0 fL Final   • MCH 09/27/2019 27.2  26.6 - 33.0 pg Final   • MCHC 09/27/2019 31.5  31.5 - 35.7 g/dL Final   • RDW 09/27/2019 13.4  12.3 - 15.4 % Final   • RDW-SD 09/27/2019 42.2  37.0 - 54.0 fl Final   • MPV 09/27/2019 10.2  6.0 - 12.0 fL Final   • Platelets 09/27/2019 208  140 - 450 10*3/mm3 Final       Physical Exam   Constitutional: She is oriented to person, place, and time. She appears well-developed and well-nourished.   HENT:   Head: Normocephalic and atraumatic.   Nose: Nose normal.   Mouth/Throat: Oropharynx is clear and moist.   Eyes: Pupils are equal, round, and reactive to light. Conjunctivae and EOM are normal.   Neck: Normal range of motion. Neck supple. No tracheal deviation present. No thyromegaly present.   Cardiovascular: Normal rate, regular rhythm, normal heart sounds and intact distal pulses.   No murmur heard.  Pulmonary/Chest: Effort normal and breath sounds normal. No respiratory distress. She has no wheezes.   Abdominal: Soft. Bowel sounds are normal. There is no tenderness. There is no guarding.   Musculoskeletal: Normal range of motion. She exhibits tenderness. She exhibits no edema.   Tenderness noted to palpation of the soft tissues of the right forearm without any particular mass or abnormality noted.   Lymphadenopathy:     She has no cervical adenopathy.   Neurological: She is alert and oriented to person, place, and time.   Skin: Skin is warm and dry. Rash noted.   Vesicular rash noted left upper quadrant and left lateral flank area and dermatomal distribution without significant erythema or streaking.   Psychiatric: She has a normal mood and affect. Her behavior is normal.   Nursing note and vitals reviewed.      Assessment/Plan     Diagnoses and all orders for this visit:    Arm pain, anterior, right  Comments:  Symptomatic care advised.  Advised to use Voltaren gel as this may be a localized self-limiting inflammatory reaction.  Orders:  -     ketorolac  (TORADOL) injection 60 mg    Post-herpetic polyneuropathy  Comments:  Left upper quadrant and flank rash consistent with shingles  Start gabapentin 3 times daily as needed  Orders:  -     valACYclovir (VALTREX) 1000 MG tablet; Take 1 tablet by mouth 3 (Three) Times a Day.    GERD without esophagitis  Comments:  Continue pantoprazole    Sleep related leg cramps  Comments:  Continue verapamil and Zanaflex    Primary osteoarthritis involving multiple joints  Comments:  Advised Tylenol PRN            This document has been electronically signed by:  Aisha Coffman PA-C

## 2020-04-03 ENCOUNTER — TELEPHONE (OUTPATIENT)
Dept: FAMILY MEDICINE CLINIC | Facility: CLINIC | Age: 60
End: 2020-04-03

## 2020-04-03 DIAGNOSIS — J40 BRONCHITIS: ICD-10-CM

## 2020-04-03 DIAGNOSIS — B02.23 POST-HERPETIC POLYNEUROPATHY: ICD-10-CM

## 2020-04-03 DIAGNOSIS — M62.838 MUSCLE SPASM OF BOTH LOWER LEGS: ICD-10-CM

## 2020-04-03 DIAGNOSIS — G47.62 NOCTURNAL LEG CRAMPS: ICD-10-CM

## 2020-04-03 DIAGNOSIS — J30.89 CHRONIC NONSEASONAL ALLERGIC RHINITIS DUE TO POLLEN: ICD-10-CM

## 2020-04-03 DIAGNOSIS — L20.84 INTRINSIC ECZEMA: ICD-10-CM

## 2020-04-03 DIAGNOSIS — K21.9 GERD WITHOUT ESOPHAGITIS: ICD-10-CM

## 2020-04-03 RX ORDER — LEVOCETIRIZINE DIHYDROCHLORIDE 5 MG/1
5 TABLET, FILM COATED ORAL EVERY MORNING
Qty: 90 TABLET | Refills: 3 | Status: SHIPPED | OUTPATIENT
Start: 2020-04-03 | End: 2021-04-16 | Stop reason: SDUPTHER

## 2020-04-03 RX ORDER — FLUTICASONE PROPIONATE 50 MCG
SPRAY, SUSPENSION (ML) NASAL
Qty: 1 BOTTLE | Refills: 4 | Status: SHIPPED | OUTPATIENT
Start: 2020-04-03 | End: 2021-01-22 | Stop reason: SDUPTHER

## 2020-04-03 RX ORDER — ALBUTEROL SULFATE 90 UG/1
2 AEROSOL, METERED RESPIRATORY (INHALATION) EVERY 6 HOURS PRN
Qty: 1 INHALER | Refills: 5 | Status: SHIPPED | OUTPATIENT
Start: 2020-04-03 | End: 2020-09-25 | Stop reason: SDUPTHER

## 2020-04-03 RX ORDER — MECLIZINE HYDROCHLORIDE 25 MG/1
25 TABLET ORAL 3 TIMES DAILY PRN
Qty: 90 TABLET | Refills: 0 | Status: SHIPPED | OUTPATIENT
Start: 2020-04-03 | End: 2021-08-27 | Stop reason: SDUPTHER

## 2020-04-03 RX ORDER — TIZANIDINE 4 MG/1
4 TABLET ORAL NIGHTLY PRN
Qty: 90 TABLET | Refills: 3 | Status: SHIPPED | OUTPATIENT
Start: 2020-04-03 | End: 2021-04-16 | Stop reason: SDUPTHER

## 2020-04-03 RX ORDER — PANTOPRAZOLE SODIUM 40 MG/1
40 TABLET, DELAYED RELEASE ORAL DAILY
Qty: 90 TABLET | Refills: 3 | Status: SHIPPED | OUTPATIENT
Start: 2020-04-03 | End: 2020-10-20 | Stop reason: SDUPTHER

## 2020-04-03 NOTE — TELEPHONE ENCOUNTER
----- Message from MAIK An sent at 4/3/2020  3:10 PM EDT -----  Please inform the patient that I sent in her refills.  Please make her a regular appointment back with me in approximately 3 months and if she needs anything prior to that time she is welcome to call us    ----- Message -----  From: Brittany Chaudhary RegSched Rep  Sent: 4/3/2020   2:25 PM EDT  To: MAIK An    Jennifer was scheduled for Friday 4/10, but is doing very well   Just needs refills on all meds said to let you know the new med for her leg cramps is working great

## 2020-05-04 DIAGNOSIS — B02.23 POST-HERPETIC POLYNEUROPATHY: ICD-10-CM

## 2020-05-04 RX ORDER — GABAPENTIN 300 MG/1
300 CAPSULE ORAL 3 TIMES DAILY
Qty: 90 CAPSULE | Refills: 2 | Status: SHIPPED | OUTPATIENT
Start: 2020-05-04 | End: 2020-07-17 | Stop reason: SDUPTHER

## 2020-05-22 ENCOUNTER — TRANSCRIBE ORDERS (OUTPATIENT)
Dept: ADMINISTRATIVE | Facility: HOSPITAL | Age: 60
End: 2020-05-22

## 2020-05-22 DIAGNOSIS — R12 HEARTBURN: ICD-10-CM

## 2020-05-22 DIAGNOSIS — R10.13 ABDOMINAL PAIN, EPIGASTRIC: Primary | ICD-10-CM

## 2020-05-22 DIAGNOSIS — Z98.84 STATUS POST BARIATRIC SURGERY: ICD-10-CM

## 2020-05-27 ENCOUNTER — TRANSCRIBE ORDERS (OUTPATIENT)
Dept: ADMINISTRATIVE | Facility: HOSPITAL | Age: 60
End: 2020-05-27

## 2020-05-27 DIAGNOSIS — Z01.818 PRE-OPERATIVE CLEARANCE: Primary | ICD-10-CM

## 2020-05-28 ENCOUNTER — TELEPHONE (OUTPATIENT)
Dept: INTERVENTIONAL RADIOLOGY/VASCULAR | Facility: HOSPITAL | Age: 60
End: 2020-05-28

## 2020-05-28 NOTE — TELEPHONE ENCOUNTER
Information for outpatient covid testing information given, patient verbalized understanding and will call to pre-register and get covid test prior to scheduled procedure.

## 2020-05-29 ENCOUNTER — APPOINTMENT (OUTPATIENT)
Dept: GENERAL RADIOLOGY | Facility: HOSPITAL | Age: 60
End: 2020-05-29

## 2020-05-29 ENCOUNTER — LAB (OUTPATIENT)
Dept: LAB | Facility: HOSPITAL | Age: 60
End: 2020-05-29

## 2020-05-29 DIAGNOSIS — Z01.818 PRE-OPERATIVE CLEARANCE: ICD-10-CM

## 2020-05-29 LAB
REF LAB TEST METHOD: NORMAL
SARS-COV-2 RNA RESP QL NAA+PROBE: NOT DETECTED

## 2020-06-01 ENCOUNTER — HOSPITAL ENCOUNTER (OUTPATIENT)
Dept: GENERAL RADIOLOGY | Facility: HOSPITAL | Age: 60
Discharge: HOME | End: 2020-06-01
Admitting: PHYSICIAN ASSISTANT

## 2020-06-01 DIAGNOSIS — R12 HEARTBURN: ICD-10-CM

## 2020-06-01 DIAGNOSIS — Z98.84 STATUS POST BARIATRIC SURGERY: ICD-10-CM

## 2020-06-01 DIAGNOSIS — R10.13 ABDOMINAL PAIN, EPIGASTRIC: ICD-10-CM

## 2020-06-01 PROCEDURE — 74248 X-RAY SM INT F-THRU STD: CPT | Performed by: RADIOLOGY

## 2020-06-01 PROCEDURE — 74246 X-RAY XM UPR GI TRC 2CNTRST: CPT | Performed by: RADIOLOGY

## 2020-06-01 PROCEDURE — 74248 X-RAY SM INT F-THRU STD: CPT

## 2020-06-01 PROCEDURE — 74246 X-RAY XM UPR GI TRC 2CNTRST: CPT

## 2020-06-19 ENCOUNTER — APPOINTMENT (OUTPATIENT)
Dept: GENERAL RADIOLOGY | Facility: HOSPITAL | Age: 60
End: 2020-06-19

## 2020-06-19 ENCOUNTER — HOSPITAL ENCOUNTER (EMERGENCY)
Facility: HOSPITAL | Age: 60
Discharge: HOME | End: 2020-06-19
Attending: EMERGENCY MEDICINE | Admitting: EMERGENCY MEDICINE

## 2020-06-19 VITALS
OXYGEN SATURATION: 100 % | HEIGHT: 64 IN | DIASTOLIC BLOOD PRESSURE: 90 MMHG | RESPIRATION RATE: 20 BRPM | WEIGHT: 210 LBS | HEART RATE: 74 BPM | SYSTOLIC BLOOD PRESSURE: 150 MMHG | TEMPERATURE: 97 F | BODY MASS INDEX: 35.85 KG/M2

## 2020-06-19 DIAGNOSIS — S42.214A CLOSED NONDISPLACED FRACTURE OF SURGICAL NECK OF RIGHT HUMERUS, UNSPECIFIED FRACTURE MORPHOLOGY, INITIAL ENCOUNTER: Primary | ICD-10-CM

## 2020-06-19 PROCEDURE — 99283 EMERGENCY DEPT VISIT LOW MDM: CPT

## 2020-06-19 PROCEDURE — 73060 X-RAY EXAM OF HUMERUS: CPT

## 2020-06-19 PROCEDURE — 73630 X-RAY EXAM OF FOOT: CPT

## 2020-06-19 RX ORDER — HYDROCODONE BITARTRATE AND ACETAMINOPHEN 10; 325 MG/1; MG/1
1 TABLET ORAL ONCE
Status: COMPLETED | OUTPATIENT
Start: 2020-06-19 | End: 2020-06-19

## 2020-06-19 RX ORDER — HYDROCODONE BITARTRATE AND ACETAMINOPHEN 10; 325 MG/1; MG/1
1 TABLET ORAL EVERY 6 HOURS PRN
Qty: 12 TABLET | Refills: 0 | Status: SHIPPED | OUTPATIENT
Start: 2020-06-19 | End: 2020-07-17

## 2020-06-19 RX ADMIN — HYDROCODONE BITARTRATE AND ACETAMINOPHEN 1 TABLET: 10; 325 TABLET ORAL at 20:54

## 2020-06-19 RX ADMIN — HYDROCODONE BITARTRATE AND ACETAMINOPHEN 1 TABLET: 10; 325 TABLET ORAL at 21:10
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
## 2020-06-22 ENCOUNTER — DOCUMENTATION (OUTPATIENT)
Dept: FAMILY MEDICINE CLINIC | Facility: CLINIC | Age: 60
End: 2020-06-22

## 2020-06-22 RX ORDER — PROMETHAZINE HYDROCHLORIDE 25 MG/1
25 TABLET ORAL EVERY 6 HOURS PRN
Qty: 120 TABLET | Refills: 5 | Status: SHIPPED | OUTPATIENT
Start: 2020-06-22 | End: 2020-07-22

## 2020-06-25 ENCOUNTER — APPOINTMENT (OUTPATIENT)
Dept: MAMMOGRAPHY | Facility: HOSPITAL | Age: 60
End: 2020-06-25

## 2020-07-17 ENCOUNTER — OFFICE VISIT (OUTPATIENT)
Dept: FAMILY MEDICINE CLINIC | Facility: CLINIC | Age: 60
End: 2020-07-17

## 2020-07-17 VITALS
DIASTOLIC BLOOD PRESSURE: 76 MMHG | BODY MASS INDEX: 35.85 KG/M2 | HEIGHT: 64 IN | WEIGHT: 210 LBS | SYSTOLIC BLOOD PRESSURE: 120 MMHG

## 2020-07-17 DIAGNOSIS — S42.294D OTHER CLOSED NONDISPLACED FRACTURE OF PROXIMAL END OF RIGHT HUMERUS WITH ROUTINE HEALING, SUBSEQUENT ENCOUNTER: ICD-10-CM

## 2020-07-17 DIAGNOSIS — Z13.220 SCREENING FOR HYPERLIPIDEMIA: ICD-10-CM

## 2020-07-17 DIAGNOSIS — G25.81 RESTLESS LEGS: ICD-10-CM

## 2020-07-17 DIAGNOSIS — E55.9 VITAMIN D DEFICIENCY: ICD-10-CM

## 2020-07-17 DIAGNOSIS — B02.23 POST-HERPETIC POLYNEUROPATHY: ICD-10-CM

## 2020-07-17 DIAGNOSIS — G47.62 SLEEP RELATED LEG CRAMPS: Primary | ICD-10-CM

## 2020-07-17 DIAGNOSIS — Z98.890 STATUS POST GASTRIC SURGERY: ICD-10-CM

## 2020-07-17 PROCEDURE — 99442 PR PHYS/QHP TELEPHONE EVALUATION 11-20 MIN: CPT | Performed by: PHYSICIAN ASSISTANT

## 2020-07-17 RX ORDER — GABAPENTIN 300 MG/1
300 CAPSULE ORAL 3 TIMES DAILY
Qty: 90 CAPSULE | Refills: 2 | Status: SHIPPED | OUTPATIENT
Start: 2020-07-17 | End: 2021-01-22 | Stop reason: SDUPTHER

## 2020-07-17 NOTE — PROGRESS NOTES
Subjective   Jennifer Horne is a 60 y.o. female.     Telephone Visit    You have chosen to receive care through a telephone visit. Do you consent to use a telephone visit for your medical care today? Yes    This visit has been rescheduled as a phone visit to comply with patient safety concerns in accordance with CDC recommendations. Total time of discussion was 11 minutes.    Chief Complaint -leg cramps    History of Present Illness -      Leg cramps-  Significantly improved with the use of verapamil.    Restless leg syndrome-stable with verapamil and gabapentin    Vitamin D deficiency-stable with vitamin D supplementation    Obesity-  Not at goal that she continues to be as active as tolerated given her recent right humerus fracture.  She has been followed by orthopedic surgeon.  She states that this point she does not have to have surgery but is starting to have a frozen shoulder so she is scheduled to start physical therapy in the near future.  She is status post gastric surgery.  We discussed her weight activity and dietary issues today.    Arm pain-  She does complain of pain in her right arm.  She had a fall with resulting fracture of the right humerus.  She has been followed by Dr. Solis orthopedist.  At this time she states that she is scheduled to start physical therapy to loosen up the shoulder joint but no surgical intervention is advised yet.    The following portions of the patient's history were reviewed and updated as appropriate: allergies, current medications, past family history, past medical history, past social history, past surgical history and problem list.    Review of Systems   Constitutional: Negative for activity change, appetite change, fatigue and fever.   HENT: Negative for ear pain, sinus pressure and sore throat.    Eyes: Negative for pain and visual disturbance.   Respiratory: Negative for cough and chest tightness.    Cardiovascular: Negative for chest pain and palpitations.  "  Gastrointestinal: Negative for abdominal pain, constipation, diarrhea, nausea and vomiting.   Endocrine: Negative for polydipsia and polyuria.   Genitourinary: Negative for dysuria and frequency.   Musculoskeletal: Positive for arthralgias, joint swelling and myalgias (leg cramps). Negative for back pain.   Skin: Negative for color change and rash.   Allergic/Immunologic: Negative for food allergies and immunocompromised state.   Neurological: Negative for dizziness, syncope and headaches.   Hematological: Negative for adenopathy. Does not bruise/bleed easily.   Psychiatric/Behavioral: Negative for hallucinations and suicidal ideas. The patient is not nervous/anxious.        /76 Comment: pt reported  Ht 162.6 cm (64\")   Wt 95.3 kg (210 lb)   BMI 36.05 kg/m²   Lab on 05/29/2020   Component Date Value Ref Range Status   • Reference Lab Report 05/29/2020 performed by [facility]   Final   • COVID19 05/29/2020 Not Detected  Not Detected - Ref. Range Final       Physical Exam   Psychiatric: She has a normal mood and affect. Her behavior is normal. Thought content normal.       Assessment/Plan     Diagnoses and all orders for this visit:    Sleep related leg cramps  Comments:  continue verapamil    Restless legs  -     Comprehensive Metabolic Panel; Future    Vitamin D deficiency  -     Vitamin D 25 Hydroxy; Future    Status post gastric surgery  -     Comprehensive Metabolic Panel; Future  -     Hemoglobin A1c; Future    Screening for hyperlipidemia  -     Lipid Panel; Future    Other closed nondisplaced fracture of proximal end of right humerus with routine healing, subsequent encounter  Comments:  Advised to start physical therapy as this should help to prevent frozen shoulder and increased range of joint motion            This document has been electronically signed by:  Aisha Coffman PA-C    "

## 2020-08-04 ENCOUNTER — APPOINTMENT (OUTPATIENT)
Dept: MAMMOGRAPHY | Facility: HOSPITAL | Age: 60
End: 2020-08-04

## 2020-09-23 ENCOUNTER — OFFICE VISIT (OUTPATIENT)
Dept: FAMILY MEDICINE CLINIC | Facility: CLINIC | Age: 60
End: 2020-09-23

## 2020-09-23 DIAGNOSIS — J02.9 PHARYNGITIS, UNSPECIFIED ETIOLOGY: Primary | ICD-10-CM

## 2020-09-23 PROCEDURE — 99441 PR PHYS/QHP TELEPHONE EVALUATION 5-10 MIN: CPT | Performed by: NURSE PRACTITIONER

## 2020-09-23 RX ORDER — AMOXICILLIN 875 MG/1
875 TABLET, COATED ORAL EVERY 12 HOURS SCHEDULED
Qty: 20 TABLET | Refills: 0 | Status: SHIPPED | OUTPATIENT
Start: 2020-09-23 | End: 2020-09-25

## 2020-09-23 RX ORDER — FLUCONAZOLE 150 MG/1
150 TABLET ORAL ONCE
Qty: 1 TABLET | Refills: 0 | Status: SHIPPED | OUTPATIENT
Start: 2020-09-23 | End: 2020-09-23

## 2020-09-23 NOTE — PROGRESS NOTES
Establish patient called office of APRN today to discuss:   CC    Sore throat starting last night     You have chosen to receive care through a telephone visit today. Do you consent to use a telephone visit for your medical care today? Yes     Brief HPI/ROS obtained as follows:    Sore throat starting yesterday. Both grandchildren that live with her diagnosed with strep throat this morning at their doctor.  She is having a very sore throat.  No cough, no change in smell or taste, feels as if she may be running a low-grade fever.    The following portions of the patient's history, chief complaint and ROS were reviewed and updated as appropriate per provider:  Allergies, current medications, past family history, past medical history, past social history, past surgical history and problem list.    Review of Systems   Constitutional: Negative for activity change and diaphoresis.   HENT: Positive for sore throat. Negative for congestion, sinus pressure, sinus pain and sneezing.    Eyes: Negative for pain, discharge and itching.   Respiratory: Negative for cough, chest tightness and shortness of breath.    Gastrointestinal: Negative for abdominal pain, diarrhea and nausea.   Endocrine: Negative.    Genitourinary: Negative for dysuria.   Musculoskeletal: Negative for arthralgias.   Skin: Negative for rash.   Allergic/Immunologic: Positive for environmental allergies.   Neurological: Negative for dizziness and headaches.       The current allergy list and medication list was reviewed with patient for accuracy.     Assessment     Alert and oriented x3.  Respirations not labored with conversation.  No cough.  Speech normal.  Hearing adequate.  Cooperative.  Mood normal.  Thought process normal.    Diagnoses and all orders for this visit:    Pharyngitis, unspecified etiology  Comments:  Positive home exposure to strep.  Treat symptomatic.    Other orders  -     amoxicillin (AMOXIL) 875 MG tablet; Take 1 tablet by mouth Every  12 (Twelve) Hours.  -     fluconazole (Diflucan) 150 MG tablet; Take 1 tablet by mouth 1 (One) Time for 1 dose.          Fluids, rest, symptomatic treatment advised. Steam therapy. Dispose of tooth bush after 24 hours of starting antibiotics if ordered. Complete antibiotics as ordered.  Discussed possible side effects/interactions of medication. Discussed symptoms to report as well as reasons to seek urgent or emergent medical attention. Understanding stated.   Recommend follow up in 2-3 days if not improved, sooner if needed.     Start amoxicillin.  Will provide Diflucan for preventative yeast as patient generally gets vaginal yeast after antibiotics.    Patient instructed and advised to call if symptoms are increasing or new symptoms occur.    Understands reasons for urgent and emergent care.  Patient (& family) verbalized agreement for treatment plan.     Coronavirus precautions have been reviewed and discussed.  I have discussed the CDC recommendations  of social distancing, hand washing, wearing mask and disinfecting commonly touched items. Reviewed need to notify PCP and self quarantine with mild symptoms.  Discussed procedure to obtain Covid-19 testing and notification of PCP/health dept/ED/Urgent Center if symptoms begin. Understanding verbalized.    RTC 2-5 days if not improved, sooner if condition worsens/changes. Symptomatic care advised as well as reasons for urgent or emergent care. Pt / family state understanding.     This visit has been rescheduled as a phone visit to comply with patient safety concerns in accordance with CDC recommendations. Total time of discussion was 7 minutes.

## 2020-09-25 ENCOUNTER — OFFICE VISIT (OUTPATIENT)
Dept: FAMILY MEDICINE CLINIC | Facility: CLINIC | Age: 60
End: 2020-09-25

## 2020-09-25 VITALS — WEIGHT: 210 LBS | HEIGHT: 64 IN | BODY MASS INDEX: 35.85 KG/M2

## 2020-09-25 DIAGNOSIS — J40 BRONCHITIS: ICD-10-CM

## 2020-09-25 PROCEDURE — 99441 PR PHYS/QHP TELEPHONE EVALUATION 5-10 MIN: CPT | Performed by: PHYSICIAN ASSISTANT

## 2020-09-25 RX ORDER — ALBUTEROL SULFATE 90 UG/1
2 AEROSOL, METERED RESPIRATORY (INHALATION) EVERY 6 HOURS PRN
Qty: 18 G | Refills: 5 | Status: SHIPPED | OUTPATIENT
Start: 2020-09-25

## 2020-09-25 RX ORDER — DEXTROMETHORPHAN HYDROBROMIDE AND PROMETHAZINE HYDROCHLORIDE 15; 6.25 MG/5ML; MG/5ML
5 SYRUP ORAL 4 TIMES DAILY PRN
Qty: 180 ML | Refills: 0 | Status: SHIPPED | OUTPATIENT
Start: 2020-09-25 | End: 2020-10-09

## 2020-09-25 RX ORDER — AZITHROMYCIN 250 MG/1
TABLET, FILM COATED ORAL
Qty: 6 TABLET | Refills: 0 | Status: SHIPPED | OUTPATIENT
Start: 2020-09-25 | End: 2020-10-09

## 2020-09-25 NOTE — PROGRESS NOTES
Subjective   Jennifer Horne is a 60 y.o. female.     Telephone Visit    You have chosen to receive care through a telephone visit. Do you consent to use a telephone visit for your medical care today? Yes    This visit has been rescheduled as a phone visit to comply with patient safety concerns in accordance with CDC recommendations. Total time of discussion was 7 minutes.    Chief Complaint -cough    History of Present Illness -      Cough-  She complains of dry cough, headache, sore throat and postnasal drainage for the past week.  Minimal relief with amoxicillin for the last 3 days.  She reports that her symptoms have actually worsened as now she has cough which was not present a few days ago.  She was exposed to her grandchildren who had strep throat.  She denies any fever or chills.  She denies any loss of taste or smell or exposure to any known COVID-19 positive patient.    The following portions of the patient's history were reviewed and updated as appropriate: allergies, current medications, past family history, past medical history, past social history, past surgical history and problem list.    Review of Systems   Constitutional: Positive for fatigue. Negative for activity change, appetite change and fever.   HENT: Positive for postnasal drip, rhinorrhea and sore throat. Negative for ear pain and sinus pressure.    Eyes: Negative for pain and visual disturbance.   Respiratory: Positive for cough. Negative for chest tightness.    Cardiovascular: Negative for chest pain and palpitations.   Gastrointestinal: Negative for abdominal pain, constipation, diarrhea, nausea and vomiting.   Endocrine: Negative for polydipsia and polyuria.   Genitourinary: Negative for dysuria and frequency.   Musculoskeletal: Negative for back pain and myalgias.   Skin: Negative for color change and rash.   Allergic/Immunologic: Negative for food allergies and immunocompromised state.   Neurological: Negative for dizziness, syncope  "and headaches.   Hematological: Negative for adenopathy. Does not bruise/bleed easily.   Psychiatric/Behavioral: Negative for hallucinations and suicidal ideas. The patient is not nervous/anxious.        Ht 162.6 cm (64\")   Wt 95.3 kg (210 lb) Comment: Patient reported all vitals on telephone visit today  BMI 36.05 kg/m²     Physical Exam  Vitals signs and nursing note reviewed.   HENT:      Mouth/Throat:      Comments: Voice sounds hoarse on telephone visit today.  Neurological:      Mental Status: She is alert and oriented to person, place, and time.   Psychiatric:         Mood and Affect: Mood normal.         Thought Content: Thought content normal.         Assessment/Plan     Diagnoses and all orders for this visit:    Bronchitis  Comments:  Discontinue amoxicillin  Start azithromycin  Advised albuterol HFA as needed  Advised Mucinex x10 days  Orders:  -     promethazine-dextromethorphan (PROMETHAZINE-DM) 6.25-15 MG/5ML syrup; Take 5 mL by mouth 4 (Four) Times a Day As Needed for Cough.  -     azithromycin (Zithromax) 250 MG tablet; Take 2 tablets the first day, then 1 tablet daily for 4 days.  -     albuterol sulfate  (90 Base) MCG/ACT inhaler; Inhale 2 puffs Every 6 (Six) Hours As Needed for Wheezing or Shortness of Air.    Acute bronchitis  Advised regarding symptomatic treatment.  Suggested OTC remedies.  Antibiotic as per orders.  Encouraged to report if any worse or if any new symptoms.  Call in 5 days if symptoms aren't resolving.          This document has been electronically signed by:  Aisha Coffman PA-C    "

## 2020-10-06 ENCOUNTER — FLU SHOT (OUTPATIENT)
Dept: FAMILY MEDICINE CLINIC | Facility: CLINIC | Age: 60
End: 2020-10-06

## 2020-10-06 ENCOUNTER — LAB (OUTPATIENT)
Dept: FAMILY MEDICINE CLINIC | Facility: CLINIC | Age: 60
End: 2020-10-06

## 2020-10-06 DIAGNOSIS — E55.9 VITAMIN D DEFICIENCY: ICD-10-CM

## 2020-10-06 DIAGNOSIS — Z23 NEED FOR INFLUENZA VACCINATION: ICD-10-CM

## 2020-10-06 DIAGNOSIS — Z98.890 STATUS POST GASTRIC SURGERY: ICD-10-CM

## 2020-10-06 DIAGNOSIS — G25.81 RESTLESS LEGS: ICD-10-CM

## 2020-10-06 DIAGNOSIS — Z13.220 SCREENING FOR HYPERLIPIDEMIA: ICD-10-CM

## 2020-10-06 LAB
25(OH)D3 SERPL-MCNC: 56.7 NG/ML (ref 30–100)
ALBUMIN SERPL-MCNC: 4.4 G/DL (ref 3.5–5.2)
ALBUMIN/GLOB SERPL: 1.5 G/DL
ALP SERPL-CCNC: 96 U/L (ref 39–117)
ALT SERPL W P-5'-P-CCNC: 17 U/L (ref 1–33)
ANION GAP SERPL CALCULATED.3IONS-SCNC: 9.1 MMOL/L (ref 5–15)
AST SERPL-CCNC: 24 U/L (ref 1–32)
BILIRUB SERPL-MCNC: 0.4 MG/DL (ref 0–1.2)
BUN SERPL-MCNC: 10 MG/DL (ref 6–20)
BUN/CREAT SERPL: 14.3 (ref 7–25)
CALCIUM SPEC-SCNC: 9.7 MG/DL (ref 8.6–10.5)
CHLORIDE SERPL-SCNC: 102 MMOL/L (ref 98–107)
CHOLEST SERPL-MCNC: 164 MG/DL (ref 0–200)
CO2 SERPL-SCNC: 28.9 MMOL/L (ref 22–29)
CREAT SERPL-MCNC: 0.7 MG/DL (ref 0.57–1)
GFR SERPL CREATININE-BSD FRML MDRD: 86 ML/MIN/1.73
GLOBULIN UR ELPH-MCNC: 3 GM/DL
GLUCOSE SERPL-MCNC: 92 MG/DL (ref 65–99)
HBA1C MFR BLD: 5.57 % (ref 4.8–5.6)
HDLC SERPL-MCNC: 55 MG/DL (ref 40–60)
LDLC SERPL CALC-MCNC: 86 MG/DL (ref 0–100)
LDLC/HDLC SERPL: 1.56 {RATIO}
POTASSIUM SERPL-SCNC: 4.2 MMOL/L (ref 3.5–5.2)
PROT SERPL-MCNC: 7.4 G/DL (ref 6–8.5)
SODIUM SERPL-SCNC: 140 MMOL/L (ref 136–145)
TRIGL SERPL-MCNC: 117 MG/DL (ref 0–150)
VLDLC SERPL-MCNC: 23.4 MG/DL (ref 5–40)

## 2020-10-06 PROCEDURE — 90686 IIV4 VACC NO PRSV 0.5 ML IM: CPT | Performed by: GENERAL PRACTICE

## 2020-10-06 PROCEDURE — 80053 COMPREHEN METABOLIC PANEL: CPT | Performed by: PHYSICIAN ASSISTANT

## 2020-10-06 PROCEDURE — 80061 LIPID PANEL: CPT | Performed by: PHYSICIAN ASSISTANT

## 2020-10-06 PROCEDURE — 82306 VITAMIN D 25 HYDROXY: CPT | Performed by: PHYSICIAN ASSISTANT

## 2020-10-06 PROCEDURE — 90471 IMMUNIZATION ADMIN: CPT | Performed by: GENERAL PRACTICE

## 2020-10-06 PROCEDURE — 83036 HEMOGLOBIN GLYCOSYLATED A1C: CPT | Performed by: PHYSICIAN ASSISTANT

## 2020-10-09 ENCOUNTER — OFFICE VISIT (OUTPATIENT)
Dept: FAMILY MEDICINE CLINIC | Facility: CLINIC | Age: 60
End: 2020-10-09

## 2020-10-09 DIAGNOSIS — M15.9 PRIMARY OSTEOARTHRITIS INVOLVING MULTIPLE JOINTS: ICD-10-CM

## 2020-10-09 DIAGNOSIS — M25.541 ARTHRALGIA OF RIGHT HAND: Primary | ICD-10-CM

## 2020-10-09 DIAGNOSIS — Z12.31 ENCOUNTER FOR SCREENING MAMMOGRAM FOR MALIGNANT NEOPLASM OF BREAST: ICD-10-CM

## 2020-10-09 DIAGNOSIS — G47.62 SLEEP RELATED LEG CRAMPS: ICD-10-CM

## 2020-10-09 PROCEDURE — 99213 OFFICE O/P EST LOW 20 MIN: CPT | Performed by: PHYSICIAN ASSISTANT

## 2020-10-09 NOTE — PROGRESS NOTES
Subjective   Jennifer Horne is a 60 y.o. female.     Video visit    You have chosen to receive care through a telehealth visit.  Do you consent to use a video/audio connection for your medical care today? Yes      Chief Complaint -joint pain    History of Present Illness -      Joint pain-  She complains of pain in her right hand and left ankle that is intermittent moderate aching pain.  Onset greater than 1 year.  Some relief with Voltaren gel and Tylenol PRN.  She is unable to tolerate oral NSAIDs due to bariatric surgery.    Leg cramps-stable with gabapentin nightly as needed    Osteoarthritis-not at goal due to acute joint pain    Health maintenance-  She is due for breast cancer screening by mammogram    The following portions of the patient's history were reviewed and updated as appropriate: allergies, current medications, past family history, past medical history, past social history, past surgical history and problem list.    Review of Systems   Constitutional: Negative for activity change, appetite change, fatigue and fever.   HENT: Negative for ear pain, sinus pressure and sore throat.    Eyes: Negative for pain and visual disturbance.   Respiratory: Negative for cough and chest tightness.    Cardiovascular: Negative for chest pain and palpitations.   Gastrointestinal: Negative for abdominal pain, constipation, diarrhea, nausea and vomiting.   Endocrine: Negative for polydipsia and polyuria.   Genitourinary: Negative for dysuria and frequency.   Musculoskeletal: Positive for arthralgias. Negative for back pain and myalgias.        Leg cramps   Skin: Negative for color change and rash.   Allergic/Immunologic: Negative for food allergies and immunocompromised state.   Neurological: Negative for dizziness, syncope and headaches.   Hematological: Negative for adenopathy. Does not bruise/bleed easily.   Psychiatric/Behavioral: Negative for hallucinations and suicidal ideas. The patient is not  nervous/anxious.        There were no vitals taken for this visit.  Lab on 10/06/2020   Component Date Value Ref Range Status   • Glucose 10/06/2020 92  65 - 99 mg/dL Final   • BUN 10/06/2020 10  6 - 20 mg/dL Final   • Creatinine 10/06/2020 0.70  0.57 - 1.00 mg/dL Final   • Sodium 10/06/2020 140  136 - 145 mmol/L Final   • Potassium 10/06/2020 4.2  3.5 - 5.2 mmol/L Final   • Chloride 10/06/2020 102  98 - 107 mmol/L Final   • CO2 10/06/2020 28.9  22.0 - 29.0 mmol/L Final   • Calcium 10/06/2020 9.7  8.6 - 10.5 mg/dL Final   • Total Protein 10/06/2020 7.4  6.0 - 8.5 g/dL Final   • Albumin 10/06/2020 4.40  3.50 - 5.20 g/dL Final   • ALT (SGPT) 10/06/2020 17  1 - 33 U/L Final   • AST (SGOT) 10/06/2020 24  1 - 32 U/L Final   • Alkaline Phosphatase 10/06/2020 96  39 - 117 U/L Final   • Total Bilirubin 10/06/2020 0.4  0.0 - 1.2 mg/dL Final   • eGFR Non African Amer 10/06/2020 86  >60 mL/min/1.73 Final   • Globulin 10/06/2020 3.0  gm/dL Final   • A/G Ratio 10/06/2020 1.5  g/dL Final   • BUN/Creatinine Ratio 10/06/2020 14.3  7.0 - 25.0 Final   • Anion Gap 10/06/2020 9.1  5.0 - 15.0 mmol/L Final   • Total Cholesterol 10/06/2020 164  0 - 200 mg/dL Final   • Triglycerides 10/06/2020 117  0 - 150 mg/dL Final   • HDL Cholesterol 10/06/2020 55  40 - 60 mg/dL Final   • LDL Cholesterol  10/06/2020 86  0 - 100 mg/dL Final   • VLDL Cholesterol 10/06/2020 23.4  5 - 40 mg/dL Final   • LDL/HDL Ratio 10/06/2020 1.56   Final   • Hemoglobin A1C 10/06/2020 5.57  4.80 - 5.60 % Final   • 25 Hydroxy, Vitamin D 10/06/2020 56.7  30.0 - 100.0 ng/ml Final       Physical Exam  Vitals signs and nursing note reviewed.   Constitutional:       Appearance: Normal appearance. She is well-developed.   HENT:      Head: Normocephalic and atraumatic.   Eyes:      Extraocular Movements: Extraocular movements intact.      Conjunctiva/sclera: Conjunctivae normal.   Cardiovascular:      Heart sounds: Murmur present.   Pulmonary:      Effort: Pulmonary effort is  normal. No respiratory distress.      Breath sounds: Normal breath sounds. No wheezing.      Comments: Breath sounds appear normal without use of accessory muscles or wheezing appreciated on video today  Skin:     Findings: No erythema or rash.   Neurological:      Mental Status: She is alert and oriented to person, place, and time.   Psychiatric:         Mood and Affect: Mood normal.         Behavior: Behavior normal.         Thought Content: Thought content normal.         Assessment/Plan     Diagnoses and all orders for this visit:    Arthralgia of right hand  Comments:  Continue Voltaren gel and Tylenol as needed  Refer to rheumatology for further evaluation and treatment  Orders:  -     Ambulatory Referral to Rheumatology    Encounter for screening mammogram for malignant neoplasm of breast  -     Mammo Screening Digital Tomosynthesis Bilateral With CAD; Future    Primary osteoarthritis involving multiple joints  Comments:  Conservative measures advised including heat and daily stretching  Activity as tolerated    Sleep related leg cramps  Comments:  Continue gabapentin and verapamil            This document has been electronically signed by:  Aisha Coffman PA-C

## 2020-10-20 DIAGNOSIS — K21.9 GERD WITHOUT ESOPHAGITIS: ICD-10-CM

## 2020-10-20 RX ORDER — PANTOPRAZOLE SODIUM 40 MG/1
40 TABLET, DELAYED RELEASE ORAL DAILY
Qty: 90 TABLET | Refills: 3 | Status: SHIPPED | OUTPATIENT
Start: 2020-10-20 | End: 2021-08-27 | Stop reason: SDUPTHER

## 2020-10-23 ENCOUNTER — HOSPITAL ENCOUNTER (OUTPATIENT)
Dept: MAMMOGRAPHY | Facility: HOSPITAL | Age: 60
Discharge: HOME | End: 2020-10-23
Admitting: PHYSICIAN ASSISTANT

## 2020-10-23 DIAGNOSIS — Z12.31 ENCOUNTER FOR SCREENING MAMMOGRAM FOR MALIGNANT NEOPLASM OF BREAST: ICD-10-CM

## 2020-10-23 PROCEDURE — 77063 BREAST TOMOSYNTHESIS BI: CPT

## 2020-10-23 PROCEDURE — 77067 SCR MAMMO BI INCL CAD: CPT

## 2020-10-24 PROCEDURE — 77067 SCR MAMMO BI INCL CAD: CPT | Performed by: RADIOLOGY

## 2020-10-24 PROCEDURE — 77063 BREAST TOMOSYNTHESIS BI: CPT | Performed by: RADIOLOGY

## 2020-11-09 ENCOUNTER — OFFICE VISIT (OUTPATIENT)
Dept: FAMILY MEDICINE CLINIC | Facility: CLINIC | Age: 60
End: 2020-11-09

## 2020-11-09 VITALS
HEART RATE: 71 BPM | HEIGHT: 64 IN | BODY MASS INDEX: 38.24 KG/M2 | TEMPERATURE: 97.1 F | OXYGEN SATURATION: 98 % | WEIGHT: 224 LBS | DIASTOLIC BLOOD PRESSURE: 70 MMHG | SYSTOLIC BLOOD PRESSURE: 122 MMHG

## 2020-11-09 DIAGNOSIS — M79.672 LEFT FOOT PAIN: Primary | ICD-10-CM

## 2020-11-09 PROCEDURE — 99213 OFFICE O/P EST LOW 20 MIN: CPT | Performed by: PHYSICIAN ASSISTANT

## 2020-11-09 NOTE — PROGRESS NOTES
"Alesia Horne is a 60 y.o. female.       Chief Complaint -foot pain    History of Present Illness -       Foot pain-  She complains of pain in her left foot along the Achilles tendon with an associated localized swelling and hardened tender lump.  Pain and swelling are worse in the evening after ambulation.  No known specific injury.  Onset approximately 2 months.    The following portions of the patient's history were reviewed and updated as appropriate: allergies, current medications, past family history, past medical history, past social history, past surgical history and problem list.    Review of Systems   Constitutional: Negative for activity change, appetite change, fatigue and fever.   HENT: Negative for ear pain, sinus pressure and sore throat.    Eyes: Negative for pain and visual disturbance.   Respiratory: Negative for cough and chest tightness.    Cardiovascular: Negative for chest pain and palpitations.   Gastrointestinal: Negative for abdominal pain, constipation, diarrhea, nausea and vomiting.   Endocrine: Negative for polydipsia and polyuria.   Genitourinary: Negative for dysuria and frequency.   Musculoskeletal: Positive for arthralgias, gait problem and joint swelling. Negative for back pain and myalgias.   Skin: Negative for color change and rash.   Allergic/Immunologic: Negative for food allergies and immunocompromised state.   Neurological: Negative for dizziness, syncope and headaches.   Hematological: Negative for adenopathy. Does not bruise/bleed easily.   Psychiatric/Behavioral: Negative for hallucinations and suicidal ideas. The patient is not nervous/anxious.        /70 (BP Location: Left arm, Patient Position: Sitting, Cuff Size: Adult)   Pulse 71   Temp 97.1 °F (36.2 °C) (Temporal)   Ht 162.6 cm (64.02\")   Wt 102 kg (224 lb)   SpO2 98%   BMI 38.43 kg/m²     Physical Exam  Vitals signs and nursing note reviewed.   Constitutional:       Appearance: Normal " appearance. She is well-developed.   Eyes:      Extraocular Movements: Extraocular movements intact.      Conjunctiva/sclera: Conjunctivae normal.   Cardiovascular:      Rate and Rhythm: Normal rate and regular rhythm.      Heart sounds: Normal heart sounds. No murmur.   Pulmonary:      Effort: Pulmonary effort is normal. No respiratory distress.      Breath sounds: Normal breath sounds. No wheezing.   Musculoskeletal:         General: Swelling and tenderness present.      Comments: Minimal localized swelling with a hard tender nonerythematous lump noted on the left lateral Achilles tendon area   Skin:     General: Skin is warm and dry.      Findings: No rash.   Neurological:      Mental Status: She is alert and oriented to person, place, and time.   Psychiatric:         Mood and Affect: Mood normal.         Behavior: Behavior normal.         Thought Content: Thought content normal.         Assessment/Plan     Diagnoses and all orders for this visit:    1. Left foot pain (Primary)  Symptomatic care advised as this is likely a tendinitis.  Plan to refer to podiatry for further evaluation and treatment of this issue.  Conservative measures were advised including ice and rest.  -     Ambulatory Referral to Podiatry              This document has been electronically signed by:  Aisha Coffman PA-C

## 2020-11-09 NOTE — PATIENT INSTRUCTIONS
Fall Prevention in the Home, Adult  Falls can cause injuries. They can happen to people of all ages. There are many things you can do to make your home safe and to help prevent falls. Ask for help when making these changes, if needed.  What actions can I take to prevent falls?  General Instructions  · Use good lighting in all rooms. Replace any light bulbs that burn out.  · Turn on the lights when you go into a dark area. Use night-lights.  · Keep items that you use often in easy-to-reach places. Lower the shelves around your home if necessary.  · Set up your furniture so you have a clear path. Avoid moving your furniture around.  · Do not have throw rugs and other things on the floor that can make you trip.  · Avoid walking on wet floors.  · If any of your floors are uneven, fix them.  · Add color or contrast paint or tape to clearly nhan and help you see:  ? Any grab bars or handrails.  ? First and last steps of stairways.  ? Where the edge of each step is.  · If you use a stepladder:  ? Make sure that it is fully opened. Do not climb a closed stepladder.  ? Make sure that both sides of the stepladder are locked into place.  ? Ask someone to hold the stepladder for you while you use it.  · If there are any pets around you, be aware of where they are.  What can I do in the bathroom?         · Keep the floor dry. Clean up any water that spills onto the floor as soon as it happens.  · Remove soap buildup in the tub or shower regularly.  · Use non-skid mats or decals on the floor of the tub or shower.  · Attach bath mats securely with double-sided, non-slip rug tape.  · If you need to sit down in the shower, use a plastic, non-slip stool.  · Install grab bars by the toilet and in the tub and shower. Do not use towel bars as grab bars.  What can I do in the bedroom?  · Make sure that you have a light by your bed that is easy to reach.  · Do not use any sheets or blankets that are too big for your bed. They should not  hang down onto the floor.  · Have a firm chair that has side arms. You can use this for support while you get dressed.  What can I do in the kitchen?  · Clean up any spills right away.  · If you need to reach something above you, use a strong step stool that has a grab bar.  · Keep electrical cords out of the way.  · Do not use floor polish or wax that makes floors slippery. If you must use wax, use non-skid floor wax.  What can I do with my stairs?  · Do not leave any items on the stairs.  · Make sure that you have a light switch at the top of the stairs and the bottom of the stairs. If you do not have them, ask someone to add them for you.  · Make sure that there are handrails on both sides of the stairs, and use them. Fix handrails that are broken or loose. Make sure that handrails are as long as the stairways.  · Install non-slip stair treads on all stairs in your home.  · Avoid having throw rugs at the top or bottom of the stairs. If you do have throw rugs, attach them to the floor with carpet tape.  · Choose a carpet that does not hide the edge of the steps on the stairway.  · Check any carpeting to make sure that it is firmly attached to the stairs. Fix any carpet that is loose or worn.  What can I do on the outside of my home?  · Use bright outdoor lighting.  · Regularly fix the edges of walkways and driveways and fix any cracks.  · Remove anything that might make you trip as you walk through a door, such as a raised step or threshold.  · Trim any bushes or trees on the path to your home.  · Regularly check to see if handrails are loose or broken. Make sure that both sides of any steps have handrails.  · Install guardrails along the edges of any raised decks and porches.  · Clear walking paths of anything that might make someone trip, such as tools or rocks.  · Have any leaves, snow, or ice cleared regularly.  · Use sand or salt on walking paths during winter.  · Clean up any spills in your garage right  away. This includes grease or oil spills.  What other actions can I take?  · Wear shoes that:  ? Have a low heel. Do not wear high heels.  ? Have rubber bottoms.  ? Are comfortable and fit you well.  ? Are closed at the toe. Do not wear open-toe sandals.  · Use tools that help you move around (mobility aids) if they are needed. These include:  ? Canes.  ? Walkers.  ? Scooters.  ? Crutches.  · Review your medicines with your doctor. Some medicines can make you feel dizzy. This can increase your chance of falling.  Ask your doctor what other things you can do to help prevent falls.  Where to find more information  · Centers for Disease Control and Prevention, [employer]: https://cdc.gov  · National [place] on Aging: https://gc0gbcm.[place].nih.gov  Contact a doctor if:  · You are afraid of falling at home.  · You feel weak, drowsy, or dizzy at home.  · You fall at home.  Summary  · There are many simple things that you can do to make your home safe and to help prevent falls.  · Ways to make your home safe include removing tripping hazards and installing grab bars in the bathroom.  · Ask for help when making these changes in your home.  This information is not intended to replace advice given to you by your health care provider. Make sure you discuss any questions you have with your health care provider.  Document Released: 10/14/2010 Document Revised: 04/09/2020 Document Reviewed: 08/02/2018  [employer] Patient Education © 2020 [employer]

## 2021-01-22 ENCOUNTER — OFFICE VISIT (OUTPATIENT)
Dept: FAMILY MEDICINE CLINIC | Facility: CLINIC | Age: 61
End: 2021-01-22

## 2021-01-22 VITALS — HEIGHT: 64 IN | BODY MASS INDEX: 38.24 KG/M2 | WEIGHT: 224 LBS

## 2021-01-22 DIAGNOSIS — B02.23 POST-HERPETIC POLYNEUROPATHY: Chronic | ICD-10-CM

## 2021-01-22 DIAGNOSIS — J30.1 NON-SEASONAL ALLERGIC RHINITIS DUE TO POLLEN: ICD-10-CM

## 2021-01-22 DIAGNOSIS — N95.1 MENOPAUSAL STATE: Primary | Chronic | ICD-10-CM

## 2021-01-22 DIAGNOSIS — L20.84 INTRINSIC ECZEMA: Chronic | ICD-10-CM

## 2021-01-22 DIAGNOSIS — B02.23 POST-HERPETIC POLYNEUROPATHY: ICD-10-CM

## 2021-01-22 PROCEDURE — 99213 OFFICE O/P EST LOW 20 MIN: CPT | Performed by: PHYSICIAN ASSISTANT

## 2021-01-22 RX ORDER — FLUTICASONE PROPIONATE 50 MCG
SPRAY, SUSPENSION (ML) NASAL
Qty: 1 BOTTLE | Refills: 4 | Status: SHIPPED | OUTPATIENT
Start: 2021-01-22 | End: 2021-12-03 | Stop reason: SDUPTHER

## 2021-01-22 RX ORDER — CRISABOROLE 20 MG/G
5 OINTMENT TOPICAL DAILY PRN
Qty: 60 G | Refills: 5 | Status: SHIPPED | OUTPATIENT
Start: 2021-01-22

## 2021-01-22 RX ORDER — GABAPENTIN 300 MG/1
300 CAPSULE ORAL 3 TIMES DAILY
Qty: 90 CAPSULE | Refills: 1 | Status: SHIPPED | OUTPATIENT
Start: 2021-01-22 | End: 2021-05-28 | Stop reason: SDUPTHER

## 2021-01-22 NOTE — PROGRESS NOTES
Subjective   Jennifer Horne is a 61 y.o. female.     Video    You have chosen to receive care through a telehealth visit.  Do you consent to use a video/audio connection for your medical care today? Yes    Chief Complaint -menopausal    History of Present Illness -    ROS    Menopausal-  Patient reports menopausal state.  She has not had a DEXA scan and needs to be screened for osteoporosis.  She states she does take vitamin D but does not currently use calcium over-the-counter    Postherpetic neuropathy-  Stable with gabapentin nightly.  She states that she uses it every evening and then during the day twice daily as needed.    Eczema-  Patient complains of eczema that is worse on her hands and elbows during the cold weather.  Some relief with Eucrisa    Allergic rhinitis-stable with Flonase    The following portions of the patient's history were reviewed and updated as appropriate: allergies, current medications, past family history, past medical history, past social history, past surgical history and problem list.    Review of Systems   Constitutional: Negative for activity change, appetite change, fatigue and fever.   HENT: Negative for ear pain, sinus pressure and sore throat.    Eyes: Negative for pain and visual disturbance.   Respiratory: Negative for cough and chest tightness.    Cardiovascular: Negative for chest pain and palpitations.   Gastrointestinal: Negative for abdominal pain, constipation, diarrhea, nausea and vomiting.   Endocrine: Negative for polydipsia and polyuria.   Genitourinary: Negative for dysuria and frequency.   Musculoskeletal: Negative for back pain and myalgias.   Skin: Negative for color change and rash.        Eczema   Allergic/Immunologic: Negative for food allergies and immunocompromised state.   Neurological: Negative for dizziness, syncope and headaches.   Hematological: Negative for adenopathy. Does not bruise/bleed easily.   Psychiatric/Behavioral: Negative for  "hallucinations and suicidal ideas. The patient is not nervous/anxious.        Objective  Vital signs:  Ht 162.6 cm (64.02\")   Wt 102 kg (224 lb)   BMI 38.43 kg/m²     Physical Exam  Vitals signs and nursing note reviewed.   Constitutional:       Appearance: Normal appearance.   HENT:      Head: Normocephalic and atraumatic.      Mouth/Throat:      Mouth: Mucous membranes are moist.      Pharynx: Oropharynx is clear.   Eyes:      Extraocular Movements: Extraocular movements intact.      Conjunctiva/sclera: Conjunctivae normal.   Neck:      Musculoskeletal: Normal range of motion and neck supple.   Pulmonary:      Effort: Pulmonary effort is normal.      Breath sounds: Normal breath sounds. No wheezing.      Comments: Breath sounds appear normal without use of accessory muscles or wheezing appreciated today  Musculoskeletal: Normal range of motion.      Right lower leg: No edema.      Left lower leg: No edema.   Skin:     General: Skin is dry.      Findings: No erythema or rash.   Neurological:      General: No focal deficit present.      Mental Status: She is alert and oriented to person, place, and time.   Psychiatric:         Mood and Affect: Mood normal.         Thought Content: Thought content normal.         Result Review :              Assessment/Plan     Diagnoses and all orders for this visit:    1. Menopausal state (Primary)  Comments:  Advised to get calcium in her diet or take over-the-counter calcium 2000 mg daily.  Order DEXA scan for osteoporosis screening  Orders:  -     DEXA Bone Density Axial; Future    2. Post-herpetic polyneuropathy  Comments:  Mani #[id number] reviewed and is consistent  UDS at next in person visit  Rx gabapentin 300 mg #90 with 2 refills    3. Intrinsic eczema  Comments:  Continue Eucrisa  Patient was educated on eczema  Orders:  -     Crisaborole (Eucrisa) 2 % ointment; Apply 5 g topically Daily As Needed (eczema).  Dispense: 60 g; Refill: 5    4. Non-seasonal allergic " rhinitis due to pollen  Comments:  Continue Flonase  Advised to avoid allergen triggers when possible  Orders:  -     fluticasone (FLONASE) 50 MCG/ACT nasal spray; SPRAY TWO SPRAYS IN EACH NOSTRIL ONCE DAILY  Dispense: 1 bottle; Refill: 4          Follow Up   No follow-ups on file.  Patient was given instructions and counseling regarding his condition or for health maintenance advice. Please see specific information pulled into the AVS if appropriate      This document has been electronically signed by:  Aisha Coffman PA-C

## 2021-01-28 ENCOUNTER — TELEPHONE (OUTPATIENT)
Dept: FAMILY MEDICINE CLINIC | Facility: CLINIC | Age: 61
End: 2021-01-28

## 2021-01-29 ENCOUNTER — LAB (OUTPATIENT)
Dept: FAMILY MEDICINE CLINIC | Facility: CLINIC | Age: 61
End: 2021-01-29

## 2021-01-29 ENCOUNTER — OFFICE VISIT (OUTPATIENT)
Dept: FAMILY MEDICINE CLINIC | Facility: CLINIC | Age: 61
End: 2021-01-29

## 2021-01-29 VITALS — WEIGHT: 224 LBS | BODY MASS INDEX: 38.24 KG/M2 | HEIGHT: 64 IN

## 2021-01-29 DIAGNOSIS — Z20.822 EXPOSURE TO COVID-19 VIRUS: Primary | ICD-10-CM

## 2021-01-29 DIAGNOSIS — R51.9 ACUTE NONINTRACTABLE HEADACHE, UNSPECIFIED HEADACHE TYPE: ICD-10-CM

## 2021-01-29 PROCEDURE — U0004 COV-19 TEST NON-CDC HGH THRU: HCPCS | Performed by: PHYSICIAN ASSISTANT

## 2021-01-29 PROCEDURE — 99213 OFFICE O/P EST LOW 20 MIN: CPT | Performed by: PHYSICIAN ASSISTANT

## 2021-01-29 NOTE — PATIENT INSTRUCTIONS

## 2021-01-29 NOTE — PROGRESS NOTES
"Subjective   Jennifer Horne is a 61 y.o. female.     Video visit    You have chosen to receive care through a telehealth visit.  Do you consent to use a video/audio connection for your medical care today? Yes    Chief Complaint -headache    History of Present Illness -    ROS    Headache-  She complains of sudden onset of headache body aches and decreased smell.  Onset 3 days ago.  Her  tested positive for Covid earlier this week.  Patient is currently quarantined and working from her home.    The following portions of the patient's history were reviewed and updated as appropriate: allergies, current medications, past family history, past medical history, past social history, past surgical history and problem list.    Review of Systems   Constitutional: Positive for activity change, appetite change, chills and fatigue. Negative for fever.   HENT: Negative for ear pain, sinus pressure and sore throat.         Decreased sense of smell   Eyes: Negative for pain and visual disturbance.   Respiratory: Negative for cough and chest tightness.    Cardiovascular: Negative for chest pain and palpitations.   Gastrointestinal: Negative for abdominal pain, constipation, diarrhea, nausea and vomiting.   Endocrine: Negative for polydipsia and polyuria.   Genitourinary: Negative for dysuria and frequency.   Musculoskeletal: Negative for back pain and myalgias.   Skin: Negative for color change and rash.   Allergic/Immunologic: Negative for food allergies and immunocompromised state.   Neurological: Positive for headaches. Negative for dizziness and syncope.   Hematological: Negative for adenopathy. Does not bruise/bleed easily.   Psychiatric/Behavioral: Negative for hallucinations and suicidal ideas. The patient is not nervous/anxious.        Objective  Vital signs:  Ht 162.6 cm (64.02\")   Wt 102 kg (224 lb)   BMI 38.43 kg/m²     Physical Exam  Vitals signs and nursing note reviewed.   Constitutional:       Appearance: " Normal appearance. She is obese.      Comments: Patient appears lethargic   HENT:      Head: Normocephalic and atraumatic.      Mouth/Throat:      Mouth: Mucous membranes are moist.      Pharynx: Oropharynx is clear.   Eyes:      Extraocular Movements: Extraocular movements intact.      Conjunctiva/sclera: Conjunctivae normal.   Neck:      Musculoskeletal: Normal range of motion and neck supple.   Pulmonary:      Effort: Pulmonary effort is normal.      Breath sounds: Normal breath sounds. No wheezing.      Comments: Breath sounds appear normal without use of accessory muscles or wheezing appreciated today  Musculoskeletal: Normal range of motion.      Right lower leg: No edema.      Left lower leg: No edema.   Skin:     General: Skin is dry.      Findings: No erythema or rash.   Neurological:      General: No focal deficit present.      Mental Status: She is oriented to person, place, and time.   Psychiatric:         Mood and Affect: Mood normal.         Thought Content: Thought content normal.         Result Review :              Assessment/Plan     Diagnoses and all orders for this visit:    1. Exposure to COVID-19 virus (Primary)  -     COVID-19 PCR, [facility], NP SWAB IN [facility] VIRAL TRANSPORT MEDIA 24-30 HR TAT - Swab, Nasopharynx; Future    2. Acute nonintractable headache, unspecified headache type    Symptomatic care is advised  We discussed the importance of avoidance of antibiotics unnecessarily  Plan to test for Covid today  Discussed proper quarantine length of 10 days from first onset of symptoms.  She was advised that if symptoms persist or worsen she should contact back for further evaluation treatment      Follow Up {Instructions Charge Capture  Follow-up Communications :23}    Patient was given instructions and counseling regarding his condition or for health maintenance advice. Please see specific information pulled into the AVS if appropriate      This document has been electronically signed  by:  Aisha Coffman PA-C

## 2021-01-30 LAB — SARS-COV-2 RNA RESP QL NAA+PROBE: DETECTED

## 2021-01-30 NOTE — PATIENT INSTRUCTIONS

## 2021-03-12 ENCOUNTER — HOSPITAL ENCOUNTER (OUTPATIENT)
Dept: BONE DENSITY | Facility: HOSPITAL | Age: 61
Discharge: HOME | End: 2021-03-12
Admitting: PHYSICIAN ASSISTANT

## 2021-03-12 DIAGNOSIS — N95.1 MENOPAUSAL STATE: Chronic | ICD-10-CM

## 2021-03-12 PROCEDURE — 77080 DXA BONE DENSITY AXIAL: CPT

## 2021-03-12 PROCEDURE — 77080 DXA BONE DENSITY AXIAL: CPT | Performed by: RADIOLOGY

## 2021-03-15 DIAGNOSIS — M85.89 OSTEOPENIA OF MULTIPLE SITES: Primary | ICD-10-CM

## 2021-03-15 RX ORDER — RALOXIFENE HYDROCHLORIDE 60 MG/1
60 TABLET, FILM COATED ORAL DAILY
Qty: 90 TABLET | Refills: 3 | Status: SHIPPED | OUTPATIENT
Start: 2021-03-15 | End: 2021-12-03 | Stop reason: SDUPTHER

## 2021-03-18 ENCOUNTER — BULK ORDERING (OUTPATIENT)
Dept: CASE MANAGEMENT | Facility: OTHER | Age: 61
End: 2021-03-18

## 2021-03-18 DIAGNOSIS — Z23 IMMUNIZATION DUE: ICD-10-CM

## 2021-04-09 ENCOUNTER — HOSPITAL ENCOUNTER (OUTPATIENT)
Dept: GENERAL RADIOLOGY | Facility: HOSPITAL | Age: 61
Discharge: HOME | End: 2021-04-09
Admitting: PHYSICIAN ASSISTANT

## 2021-04-09 DIAGNOSIS — M79.601 PAIN OF RIGHT UPPER EXTREMITY: ICD-10-CM

## 2021-04-09 DIAGNOSIS — M79.602 PAIN OF LEFT UPPER EXTREMITY: Primary | ICD-10-CM

## 2021-04-09 PROCEDURE — 73030 X-RAY EXAM OF SHOULDER: CPT

## 2021-04-09 PROCEDURE — 73060 X-RAY EXAM OF HUMERUS: CPT | Performed by: RADIOLOGY

## 2021-04-09 PROCEDURE — 73030 X-RAY EXAM OF SHOULDER: CPT | Performed by: RADIOLOGY

## 2021-04-09 PROCEDURE — 73060 X-RAY EXAM OF HUMERUS: CPT

## 2021-04-16 ENCOUNTER — OFFICE VISIT (OUTPATIENT)
Dept: FAMILY MEDICINE CLINIC | Facility: CLINIC | Age: 61
End: 2021-04-16

## 2021-04-16 VITALS — HEIGHT: 64 IN | BODY MASS INDEX: 38.24 KG/M2 | WEIGHT: 224 LBS

## 2021-04-16 DIAGNOSIS — J30.89 CHRONIC NONSEASONAL ALLERGIC RHINITIS DUE TO POLLEN: Chronic | ICD-10-CM

## 2021-04-16 DIAGNOSIS — M62.838 MUSCLE SPASM OF BOTH LOWER LEGS: Chronic | ICD-10-CM

## 2021-04-16 DIAGNOSIS — G47.62 NOCTURNAL LEG CRAMPS: Chronic | ICD-10-CM

## 2021-04-16 DIAGNOSIS — S43.401A SPRAIN OF RIGHT SHOULDER, UNSPECIFIED SHOULDER SPRAIN TYPE, INITIAL ENCOUNTER: Primary | ICD-10-CM

## 2021-04-16 PROCEDURE — 99214 OFFICE O/P EST MOD 30 MIN: CPT | Performed by: PHYSICIAN ASSISTANT

## 2021-04-16 RX ORDER — LEVOCETIRIZINE DIHYDROCHLORIDE 5 MG/1
5 TABLET, FILM COATED ORAL EVERY MORNING
Qty: 90 TABLET | Refills: 3 | Status: SHIPPED | OUTPATIENT
Start: 2021-04-16 | End: 2021-12-03 | Stop reason: SDUPTHER

## 2021-04-16 RX ORDER — TIZANIDINE 4 MG/1
4 TABLET ORAL NIGHTLY PRN
Qty: 90 TABLET | Refills: 3 | Status: SHIPPED | OUTPATIENT
Start: 2021-04-16 | End: 2021-12-03 | Stop reason: SDUPTHER

## 2021-04-18 NOTE — PATIENT INSTRUCTIONS
Fall Prevention in the Home, Adult  Falls can cause injuries. They can happen to people of all ages. There are many things you can do to make your home safe and to help prevent falls. Ask for help when making these changes, if needed.  What actions can I take to prevent falls?  General Instructions  · Use good lighting in all rooms. Replace any light bulbs that burn out.  · Turn on the lights when you go into a dark area. Use night-lights.  · Keep items that you use often in easy-to-reach places. Lower the shelves around your home if necessary.  · Set up your furniture so you have a clear path. Avoid moving your furniture around.  · Do not have throw rugs and other things on the floor that can make you trip.  · Avoid walking on wet floors.  · If any of your floors are uneven, fix them.  · Add color or contrast paint or tape to clearly nhan and help you see:  ? Any grab bars or handrails.  ? First and last steps of stairways.  ? Where the edge of each step is.  · If you use a stepladder:  ? Make sure that it is fully opened. Do not climb a closed stepladder.  ? Make sure that both sides of the stepladder are locked into place.  ? Ask someone to hold the stepladder for you while you use it.  · If there are any pets around you, be aware of where they are.  What can I do in the bathroom?         · Keep the floor dry. Clean up any water that spills onto the floor as soon as it happens.  · Remove soap buildup in the tub or shower regularly.  · Use non-skid mats or decals on the floor of the tub or shower.  · Attach bath mats securely with double-sided, non-slip rug tape.  · If you need to sit down in the shower, use a plastic, non-slip stool.  · Install grab bars by the toilet and in the tub and shower. Do not use towel bars as grab bars.  What can I do in the bedroom?  · Make sure that you have a light by your bed that is easy to reach.  · Do not use any sheets or blankets that are too big for your bed. They should not  hang down onto the floor.  · Have a firm chair that has side arms. You can use this for support while you get dressed.  What can I do in the kitchen?  · Clean up any spills right away.  · If you need to reach something above you, use a strong step stool that has a grab bar.  · Keep electrical cords out of the way.  · Do not use floor polish or wax that makes floors slippery. If you must use wax, use non-skid floor wax.  What can I do with my stairs?  · Do not leave any items on the stairs.  · Make sure that you have a light switch at the top of the stairs and the bottom of the stairs. If you do not have them, ask someone to add them for you.  · Make sure that there are handrails on both sides of the stairs, and use them. Fix handrails that are broken or loose. Make sure that handrails are as long as the stairways.  · Install non-slip stair treads on all stairs in your home.  · Avoid having throw rugs at the top or bottom of the stairs. If you do have throw rugs, attach them to the floor with carpet tape.  · Choose a carpet that does not hide the edge of the steps on the stairway.  · Check any carpeting to make sure that it is firmly attached to the stairs. Fix any carpet that is loose or worn.  What can I do on the outside of my home?  · Use bright outdoor lighting.  · Regularly fix the edges of walkways and driveways and fix any cracks.  · Remove anything that might make you trip as you walk through a door, such as a raised step or threshold.  · Trim any bushes or trees on the path to your home.  · Regularly check to see if handrails are loose or broken. Make sure that both sides of any steps have handrails.  · Install guardrails along the edges of any raised decks and porches.  · Clear walking paths of anything that might make someone trip, such as tools or rocks.  · Have any leaves, snow, or ice cleared regularly.  · Use sand or salt on walking paths during winter.  · Clean up any spills in your garage right  away. This includes grease or oil spills.  What other actions can I take?  · Wear shoes that:  ? Have a low heel. Do not wear high heels.  ? Have rubber bottoms.  ? Are comfortable and fit you well.  ? Are closed at the toe. Do not wear open-toe sandals.  · Use tools that help you move around (mobility aids) if they are needed. These include:  ? Canes.  ? Walkers.  ? Scooters.  ? Crutches.  · Review your medicines with your doctor. Some medicines can make you feel dizzy. This can increase your chance of falling.  Ask your doctor what other things you can do to help prevent falls.  Where to find more information  · Centers for Disease Control and Prevention, [place]: https://cdc.gov  · National [place] [employer] Aging: https://oy8awmt.[place].nih.gov  Contact a doctor if:  · You are afraid of falling at home.  · You feel weak, drowsy, or dizzy at home.  · You fall at home.  Summary  · There are many simple things that you can do to make your home safe and to help prevent falls.  · Ways to make your home safe include removing tripping hazards and installing grab bars in the bathroom.  · Ask for help when making these changes in your home.  This information is not intended to replace advice given to you by your health care provider. Make sure you discuss any questions you have with your health care provider.  Document Revised: 04/09/2020 Document Reviewed: 08/02/2018  Elsevier Patient Education © 2021 [employer]

## 2021-04-18 NOTE — PROGRESS NOTES
Subjective   Jennifer Horne is a 61 y.o. female.     Video visit    You have chosen to receive care through a telehealth visit.  Do you consent to use a video/audio connection for your medical care today? Yes    Chief Complaint -shoulder pain    History of Present Illness -    ROS    Shoulder pain-  Patient complains of pain in her right shoulder due to a fall.  She has her leg in a cast so has recently started using the knee scooter.  She hit a bump in her floor at home causing her to go head over heels during a fall last week.  She reports some relief with rest and ice.  Shoulder pain in still present but improved from last week.  She denies any current shoulder swelling or erythema.    4/9/2021 x-rays of right humerus and right shoulder showed well healed fracture of proximal humerus but were otherwise unremarkable.    Allergic rhinitis-stable with Flonase and Xyzal    Muscle spasm-  Stable with tizanidine    Nocturnal leg cramps-stable with verapamil    The following portions of the patient's history were reviewed and updated as appropriate: allergies, current medications, past family history, past medical history, past social history, past surgical history and problem list.    Review of Systems   Constitutional: Positive for activity change (Due to leg cast and shoulder pain). Negative for appetite change, fatigue and fever.   HENT: Negative for ear pain, sinus pressure and sore throat.    Eyes: Negative for pain and visual disturbance.   Respiratory: Negative for cough and chest tightness.    Cardiovascular: Negative for chest pain and palpitations.   Gastrointestinal: Negative for abdominal pain, constipation, diarrhea, nausea and vomiting.   Endocrine: Negative for polydipsia and polyuria.   Genitourinary: Negative for dysuria and frequency.   Musculoskeletal: Positive for myalgias. Negative for back pain.   Skin: Negative for color change and rash.   Allergic/Immunologic: Negative for food allergies and  "immunocompromised state.   Neurological: Negative for dizziness, syncope and headaches.   Hematological: Negative for adenopathy. Does not bruise/bleed easily.   Psychiatric/Behavioral: Positive for sleep disturbance. Negative for hallucinations and suicidal ideas. The patient is not nervous/anxious.        Objective  Vital signs:  Ht 162.6 cm (64.02\")   Wt 102 kg (224 lb)   BMI 38.43 kg/m²     Physical Exam  Vitals and nursing note reviewed.   Constitutional:       Appearance: Normal appearance.   HENT:      Head: Normocephalic and atraumatic.      Mouth/Throat:      Mouth: Mucous membranes are moist.      Pharynx: Oropharynx is clear.   Eyes:      Extraocular Movements: Extraocular movements intact.      Conjunctiva/sclera: Conjunctivae normal.   Pulmonary:      Effort: Pulmonary effort is normal.      Breath sounds: Normal breath sounds. No wheezing.      Comments: Breath sounds appear normal without use of accessory muscles or wheezing appreciated today  Musculoskeletal:         General: Signs of injury present.      Cervical back: Normal range of motion and neck supple.      Right lower leg: No edema.      Left lower leg: No edema.      Comments: Patient wearing leg cast   Skin:     General: Skin is dry.      Findings: No erythema or rash.   Neurological:      General: No focal deficit present.      Mental Status: She is alert and oriented to person, place, and time.   Psychiatric:         Mood and Affect: Mood normal.         Thought Content: Thought content normal.         The following data was reviewed by: MAIK An on 04/16/2021:  CMP    CMP 10/6/20   Glucose 92   BUN 10   Creatinine 0.70   eGFR Non African Am 86   Sodium 140   Potassium 4.2   Chloride 102   Calcium 9.7   Albumin 4.40   Total Bilirubin 0.4   Alkaline Phosphatase 96   AST (SGOT) 24   ALT (SGPT) 17           Lipid Panel    Lipid Panel 10/6/20   Total Cholesterol 164   Triglycerides 117   HDL Cholesterol 55   VLDL Cholesterol " 23.4   LDL Cholesterol  86   LDL/HDL Ratio 1.56           Most Recent A1C    HGBA1C Most Recent 10/6/20   Hemoglobin A1C 5.57           Data reviewed: Radiologic studies X-ray right shoulder and x-ray humerus       Assessment/Plan     Diagnoses and all orders for this visit:    1. Sprain of right shoulder, unspecified shoulder sprain type, initial encounter (Primary)  Comments:  Conservative measures advised  X-rays reviewed with patient    2. Chronic nonseasonal allergic rhinitis due to pollen  Comments:  Continue Xyzal and Flonase  Advised to avoid known environmental allergens when possible  Orders:  -     levocetirizine (XYZAL) 5 MG tablet; Take 1 tablet by mouth Every Morning.  Dispense: 90 tablet; Refill: 3    3. Muscle spasm of both lower legs  Comments:  Continue tizanidine  Orders:  -     tiZANidine (ZANAFLEX) 4 MG tablet; Take 1 tablet by mouth At Night As Needed for Muscle Spasms.  Dispense: 90 tablet; Refill: 3    4. Nocturnal leg cramps  Comments:  Continue verapamil (CCB)  and rx compounded cream prn  Orders:  -     verapamil SR (CALAN-SR) 120 MG CR tablet; Take 1 tablet by mouth Every Night.  Dispense: 90 tablet; Refill: 3            Patient was given instructions and counseling regarding his condition or for health maintenance advice. Please see specific information pulled into the AVS if appropriate      This document has been electronically signed by:  Aisha Coffman PA-C

## 2021-05-28 ENCOUNTER — OFFICE VISIT (OUTPATIENT)
Dept: FAMILY MEDICINE CLINIC | Facility: CLINIC | Age: 61
End: 2021-05-28

## 2021-05-28 VITALS
SYSTOLIC BLOOD PRESSURE: 128 MMHG | OXYGEN SATURATION: 98 % | DIASTOLIC BLOOD PRESSURE: 80 MMHG | HEIGHT: 64 IN | WEIGHT: 224 LBS | HEART RATE: 81 BPM | TEMPERATURE: 96.8 F | BODY MASS INDEX: 38.24 KG/M2

## 2021-05-28 DIAGNOSIS — Z78.0 MENOPAUSE: Chronic | ICD-10-CM

## 2021-05-28 DIAGNOSIS — B02.23 POST-HERPETIC POLYNEUROPATHY: Chronic | ICD-10-CM

## 2021-05-28 DIAGNOSIS — G47.62 SLEEP RELATED LEG CRAMPS: Chronic | ICD-10-CM

## 2021-05-28 DIAGNOSIS — M81.0 SENILE OSTEOPOROSIS: Primary | Chronic | ICD-10-CM

## 2021-05-28 DIAGNOSIS — Z12.72 SCREENING FOR VAGINAL CANCER: ICD-10-CM

## 2021-05-28 DIAGNOSIS — B02.23 POST-HERPETIC POLYNEUROPATHY: ICD-10-CM

## 2021-05-28 DIAGNOSIS — Z12.11 ENCOUNTER FOR SCREENING FECAL OCCULT BLOOD TESTING: ICD-10-CM

## 2021-05-28 LAB — HEMOCCULT STL QL: NEGATIVE

## 2021-05-28 PROCEDURE — 82270 OCCULT BLOOD FECES: CPT | Performed by: PHYSICIAN ASSISTANT

## 2021-05-28 PROCEDURE — 99214 OFFICE O/P EST MOD 30 MIN: CPT | Performed by: PHYSICIAN ASSISTANT

## 2021-05-28 RX ORDER — GABAPENTIN 300 MG/1
300 CAPSULE ORAL 3 TIMES DAILY
Qty: 90 CAPSULE | Refills: 1 | Status: SHIPPED | OUTPATIENT
Start: 2021-05-28 | End: 2021-08-27 | Stop reason: SDUPTHER

## 2021-05-30 PROBLEM — B02.23 POST-HERPETIC POLYNEUROPATHY: Chronic | Status: ACTIVE | Noted: 2021-05-30

## 2021-05-30 PROBLEM — Z78.0 MENOPAUSE: Status: ACTIVE | Noted: 2021-05-30

## 2021-05-30 PROBLEM — M81.0 SENILE OSTEOPOROSIS: Chronic | Status: ACTIVE | Noted: 2021-05-30

## 2021-05-30 NOTE — PROGRESS NOTES
Subjective   Jennifer Horne is a 61 y.o. female.       Chief Complaint -osteoporosis    History of Present Illness -    ROS    Osteoporosis-  Patient has abnormal DEXA scan and has had fracture of her arm as well as her foot.  Her right foot was in a cast which has now been removed but she states that she still has to use walking boot for ambulation.  She continues to see podiatry and there is some concern that the Achilles tendon may not be healing well and may require surgery in the future.  She states that she has not started taking her Evista since she has been in the boot.    Vaginal cancer screening-  She is also here for her Pap smear today.  She had a partial abdominal hysterectomy in 2008 secondary to a benign tumor.  She states that she has had an abnormal Pap smear years ago but has had normal Pap smears since that time.    Postherpetic neuropathy-  Stable with gabapentin    Leg cramps-  Stable with verapamil      The following portions of the patient's history were reviewed and updated as appropriate: allergies, current medications, past family history, past medical history, past social history, past surgical history and problem list.    Review of Systems   Constitutional: Negative for activity change, appetite change, fatigue and fever.   HENT: Negative for ear pain, sinus pressure and sore throat.    Eyes: Negative for pain and visual disturbance.   Respiratory: Negative for cough and chest tightness.    Cardiovascular: Negative for chest pain and palpitations.   Gastrointestinal: Negative for abdominal pain, constipation, diarrhea, nausea and vomiting.   Endocrine: Negative for polydipsia and polyuria.   Genitourinary: Negative for dysuria and frequency.   Musculoskeletal: Positive for arthralgias, gait problem and myalgias. Negative for back pain.   Skin: Negative for color change and rash.   Allergic/Immunologic: Negative for food allergies and immunocompromised state.   Neurological: Negative  "for dizziness, syncope and headaches.   Hematological: Negative for adenopathy. Does not bruise/bleed easily.   Psychiatric/Behavioral: Negative for hallucinations and suicidal ideas. The patient is not nervous/anxious.        Objective  Vital signs:  /80   Pulse 81   Temp 96.8 °F (36 °C) (Temporal)   Ht 162.6 cm (64.02\")   Wt 102 kg (224 lb)   SpO2 98%   BMI 38.43 kg/m²     Physical Exam  Vitals and nursing note reviewed. Exam conducted with a chaperone present (Isabella Norman [facility]).   Constitutional:       Appearance: Normal appearance. She is well-developed. She is obese.   HENT:      Head: Normocephalic and atraumatic.      Right Ear: Tympanic membrane normal.      Left Ear: Tympanic membrane normal.      Nose: Nose normal.      Mouth/Throat:      Mouth: Mucous membranes are moist.      Pharynx: No oropharyngeal exudate or posterior oropharyngeal erythema.   Eyes:      Extraocular Movements: Extraocular movements intact.      Conjunctiva/sclera: Conjunctivae normal.   Neck:      Thyroid: No thyromegaly.      Trachea: No tracheal deviation.   Cardiovascular:      Rate and Rhythm: Normal rate and regular rhythm.      Heart sounds: Normal heart sounds. No murmur heard.     Pulmonary:      Effort: Pulmonary effort is normal. No respiratory distress.      Breath sounds: Normal breath sounds. No wheezing.   Chest:      Chest wall: No mass, swelling or tenderness.      Breasts:         Right: No swelling, bleeding, inverted nipple, mass, nipple discharge, skin change or tenderness.         Left: No swelling, bleeding, inverted nipple, mass, nipple discharge, skin change or tenderness.   Abdominal:      General: Bowel sounds are normal.      Palpations: Abdomen is soft.      Tenderness: There is no abdominal tenderness. There is no guarding.      Hernia: There is no hernia in the left inguinal area or right inguinal area.   Genitourinary:     Exam position: Supine.      Pubic Area: No rash.       Labia:  "        Right: No rash, tenderness or lesion.         Left: No rash, tenderness or lesion.       Urethra: No urethral pain, urethral swelling or urethral lesion.      Vagina: No signs of injury and foreign body. No vaginal discharge, erythema, tenderness or bleeding.      Rectum: No mass, tenderness or anal fissure.      Comments: Cervix and uterus are surgically absent.   Musculoskeletal:         General: Tenderness present.      Cervical back: Normal range of motion and neck supple.      Right lower leg: Edema present.      Left lower leg: No edema.      Comments: Decreased range of joint motion with right ankle/foot.  Patient has her walking boot off today as she is going to have Pap smear performed   Lymphadenopathy:      Cervical: No cervical adenopathy.      Upper Body:      Right upper body: No supraclavicular or axillary adenopathy.      Left upper body: No supraclavicular or axillary adenopathy.      Lower Body: No right inguinal adenopathy. No left inguinal adenopathy.   Skin:     General: Skin is warm and dry.      Findings: No rash.   Neurological:      General: No focal deficit present.      Mental Status: She is alert and oriented to person, place, and time.   Psychiatric:         Mood and Affect: Mood normal.         Behavior: Behavior normal.         Thought Content: Thought content normal.         The following data was reviewed by: MAIK An on 05/28/2021:  CMP    CMP 10/6/20   Glucose 92   BUN 10   Creatinine 0.70   eGFR Non African Am 86   Sodium 140   Potassium 4.2   Chloride 102   Calcium 9.7   Albumin 4.40   Total Bilirubin 0.4   Alkaline Phosphatase 96   AST (SGOT) 24   ALT (SGPT) 17             Lipid Panel    Lipid Panel 10/6/20   Total Cholesterol 164   Triglycerides 117   HDL Cholesterol 55   VLDL Cholesterol 23.4   LDL Cholesterol  86   LDL/HDL Ratio 1.56               Most Recent A1C    HGBA1C Most Recent 10/6/20   Hemoglobin A1C 5.57                  Assessment/Plan      Diagnoses and all orders for this visit:    1. Senile osteoporosis (Primary)  Comments:  Start Evista  Continue calcium and vitamin D supplementation  Fall precautions advised    2. Menopause  Comments:  Fall risk precautions advised  Advised calcium and vitamin D supplementation    3. Screening for vaginal cancer  -     Liquid-based Pap Smear, Screening; Future    4. Encounter for screening fecal occult blood testing  -     Occult Blood X 1, Stool - Stool, Per Rectum; Future  -     Occult Blood X 1, Stool - Stool, Per Rectum    5. Post-herpetic polyneuropathy  Comments:  UDS and Mani's are consistent and on file  Continue gabapentin    6. Sleep related leg cramps  Comments:  Continue verapamil            Patient was given instructions and counseling regarding his condition or for health maintenance advice. Please see specific information pulled into the AVS if appropriate      This document has been electronically signed by:  Aisha Cfofman PA-C

## 2021-05-30 NOTE — PATIENT INSTRUCTIONS

## 2021-06-14 RX ORDER — GUAIFENESIN AND CODEINE PHOSPHATE 100; 10 MG/5ML; MG/5ML
5 SOLUTION ORAL 3 TIMES DAILY PRN
Qty: 118 ML | Refills: 0 | Status: SHIPPED | OUTPATIENT
Start: 2021-06-14 | End: 2021-07-12

## 2021-07-12 ENCOUNTER — OFFICE VISIT (OUTPATIENT)
Dept: FAMILY MEDICINE CLINIC | Facility: CLINIC | Age: 61
End: 2021-07-12

## 2021-07-12 ENCOUNTER — HOSPITAL ENCOUNTER (OUTPATIENT)
Dept: GENERAL RADIOLOGY | Facility: HOSPITAL | Age: 61
Discharge: HOME | End: 2021-07-12
Admitting: PHYSICIAN ASSISTANT

## 2021-07-12 VITALS
SYSTOLIC BLOOD PRESSURE: 134 MMHG | TEMPERATURE: 96.8 F | OXYGEN SATURATION: 97 % | WEIGHT: 226 LBS | DIASTOLIC BLOOD PRESSURE: 82 MMHG | BODY MASS INDEX: 38.58 KG/M2 | HEIGHT: 64 IN | HEART RATE: 74 BPM

## 2021-07-12 DIAGNOSIS — J30.1 NON-SEASONAL ALLERGIC RHINITIS DUE TO POLLEN: Chronic | ICD-10-CM

## 2021-07-12 DIAGNOSIS — R06.02 SHORTNESS OF BREATH: ICD-10-CM

## 2021-07-12 DIAGNOSIS — R06.02 SHORTNESS OF BREATH: Primary | ICD-10-CM

## 2021-07-12 DIAGNOSIS — E55.9 VITAMIN D DEFICIENCY: Chronic | ICD-10-CM

## 2021-07-12 DIAGNOSIS — G25.81 RESTLESS LEGS: Chronic | ICD-10-CM

## 2021-07-12 PROCEDURE — 99214 OFFICE O/P EST MOD 30 MIN: CPT | Performed by: PHYSICIAN ASSISTANT

## 2021-07-12 PROCEDURE — 93000 ELECTROCARDIOGRAM COMPLETE: CPT | Performed by: PHYSICIAN ASSISTANT

## 2021-07-12 PROCEDURE — 71046 X-RAY EXAM CHEST 2 VIEWS: CPT | Performed by: RADIOLOGY

## 2021-07-12 PROCEDURE — 71046 X-RAY EXAM CHEST 2 VIEWS: CPT

## 2021-07-12 NOTE — PATIENT INSTRUCTIONS
"Fat and Cholesterol Restricted Eating Plan  Getting too much fat and cholesterol in your diet may cause health problems. Choosing the right foods helps keep your fat and cholesterol at normal levels. This can keep you from getting certain diseases.  Your doctor may recommend an eating plan that includes:  · Total fat: ______% or less of total calories a day.  · Saturated fat: ______% or less of total calories a day.  · Cholesterol: less than _________mg a day.  · Fiber: ______g a day.  What are tips for following this plan?  Meal planning  · At meals, divide your plate into four equal parts:  ? Fill one-half of your plate with vegetables and green salads.  ? Fill one-fourth of your plate with whole grains.  ? Fill one-fourth of your plate with low-fat (lean) protein foods.  · Eat fish that is high in omega-3 fats at least two times a week. This includes mackerel, tuna, sardines, and salmon.  · Eat foods that are high in fiber, such as whole grains, beans, apples, broccoli, carrots, peas, and barley.  General tips    · Work with your doctor to lose weight if you need to.  · Avoid:  ? Foods with added sugar.  ? Fried foods.  ? Foods with partially hydrogenated oils.  · Limit alcohol intake to no more than 1 drink a day for nonpregnant women and 2 drinks a day for men. One drink equals 12 oz of beer, 5 oz of wine, or 1½ oz of hard liquor.  Reading food labels  · Check food labels for:  ? Trans fats.  ? Partially hydrogenated oils.  ? Saturated fat (g) in each serving.  ? Cholesterol (mg) in each serving.  ? Fiber (g) in each serving.  · Choose foods with healthy fats, such as:  ? Monounsaturated fats.  ? Polyunsaturated fats.  ? Omega-3 fats.  · Choose grain products that have whole grains. Look for the word \"whole\" as the first word in the ingredient list.  Cooking  · Cook foods using low-fat methods. These include baking, boiling, grilling, and broiling.  · Eat more home-cooked foods. Eat at restaurants and buffets " less often.  · Avoid cooking using saturated fats, such as butter, cream, palm oil, palm kernel oil, and coconut oil.  Recommended foods    Fruits  · All fresh, canned (in natural juice), or frozen fruits.  Vegetables  · Fresh or frozen vegetables (raw, steamed, roasted, or grilled). Green salads.  Grains  · Whole grains, such as whole wheat or whole grain breads, crackers, cereals, and pasta. Unsweetened oatmeal, bulgur, barley, quinoa, or brown rice. Corn or whole wheat flour tortillas.  Meats and other protein foods  · Ground beef (85% or leaner), grass-fed beef, or beef trimmed of fat. Skinless chicken or turkey. Ground chicken or turkey. Pork trimmed of fat. All fish and seafood. Egg whites. Dried beans, peas, or lentils. Unsalted nuts or seeds. Unsalted canned beans. Nut butters without added sugar or oil.  Dairy  · Low-fat or nonfat dairy products, such as skim or 1% milk, 2% or reduced-fat cheeses, low-fat and fat-free ricotta or cottage cheese, or plain low-fat and nonfat yogurt.  Fats and oils  · Tub margarine without trans fats. Light or reduced-fat mayonnaise and salad dressings. Avocado. Olive, canola, sesame, or safflower oils.  The items listed above may not be a complete list of foods and beverages you can eat. Contact a dietitian for more information.  Foods to avoid  Fruits  · Canned fruit in heavy syrup. Fruit in cream or butter sauce. Fried fruit.  Vegetables  · Vegetables cooked in cheese, cream, or butter sauce. Fried vegetables.  Grains  · White bread. White pasta. White rice. Cornbread. Bagels, pastries, and croissants. Crackers and snack foods that contain trans fat and hydrogenated oils.  Meats and other protein foods  · Fatty cuts of meat. Ribs, chicken wings, kennedy, sausage, bologna, salami, chitterlings, fatback, hot dogs, bratwurst, and packaged lunch meats. Liver and organ meats. Whole eggs and egg yolks. Chicken and turkey with skin. Fried meat.  Dairy  · Whole or 2% milk, cream,  half-and-half, and cream cheese. Whole milk cheeses. Whole-fat or sweetened yogurt. Full-fat cheeses. Nondairy creamers and whipped toppings. Processed cheese, cheese spreads, and cheese curds.  Beverages  · Alcohol. Sugar-sweetened drinks such as sodas, lemonade, and fruit drinks.  Fats and oils  · Butter, stick margarine, lard, shortening, ghee, or kennedy fat. Coconut, palm kernel, and palm oils.  Sweets and desserts  · Corn syrup, sugars, honey, and molasses. Candy. Jam and jelly. Syrup. Sweetened cereals. Cookies, pies, cakes, donuts, muffins, and ice cream.  The items listed above may not be a complete list of foods and beverages you should avoid. Contact a dietitian for more information.  Summary  · Choosing the right foods helps keep your fat and cholesterol at normal levels. This can keep you from getting certain diseases.  · At meals, fill one-half of your plate with vegetables and green salads.  · Eat high-fiber foods, like whole grains, beans, apples, carrots, peas, and barley.  · Limit added sugar, saturated fats, alcohol, and fried foods.  This information is not intended to replace advice given to you by your health care provider. Make sure you discuss any questions you have with your health care provider.  Document Revised: 08/21/2019 Document Reviewed: 09/04/2018  [employer] Patient Education © 2021 [employer]      Shortness of Breath, Adult  Shortness of breath means you have trouble breathing. Shortness of breath could be a sign of a medical problem.  Follow these instructions at home:    · Watch for any changes in your symptoms.  · Do not use any products that contain nicotine or tobacco, such as cigarettes, e-cigarettes, and chewing tobacco.  · Do not smoke. Smoking can cause shortness of breath. If you need help to quit smoking, ask your doctor.  · Avoid things that can make it harder to breathe, such as:  ? Mold.  ? Dust.  ? Air pollution.  ? Chemical smells.  ? Things that can cause allergy  symptoms (allergens), if you have allergies.  · Keep your living space clean. Use products that help remove mold and dust.  · Rest as needed. Slowly return to your normal activities.  · Take over-the-counter and prescription medicines only as told by your doctor. This includes oxygen therapy and inhaled medicines.  · Keep all follow-up visits as told by your doctor. This is important.  Contact a doctor if:  · Your condition does not get better as soon as expected.  · You have a hard time doing your normal activities, even after you rest.  · You have new symptoms.  Get help right away if:  · Your shortness of breath gets worse.  · You have trouble breathing when you are resting.  · You feel light-headed or you pass out (faint).  · You have a cough that is not helped by medicines.  · You cough up blood.  · You have pain with breathing.  · You have pain in your chest, arms, shoulders, or belly (abdomen).  · You have a fever.  · You cannot walk up stairs.  · You cannot exercise the way you normally do.  These symptoms may represent a serious problem that is an emergency. Do not wait to see if the symptoms will go away. Get medical help right away. Call your local emergency services (911 in the [place] Do not drive yourself to the hospital.  Summary  · Shortness of breath is when you have trouble breathing enough air. It can be a sign of a medical problem.  · Avoid things that make it hard for you to breathe, such as smoking, pollution, mold, and dust.  · Watch for any changes in your symptoms. Contact your doctor if you do not get better or you get worse.  This information is not intended to replace advice given to you by your health care provider. Make sure you discuss any questions you have with your health care provider.  Document Revised: 05/20/2019 Document Reviewed: 05/20/2019  [employer] Patient Education © 2021 [employer]

## 2021-07-12 NOTE — PROGRESS NOTES
Subjective   Jennifer Horne is a 61 y.o. female.       Chief Complaint -shortness of breath    History of Present Illness -    ROS    Shortness of breath-  Patient complains of intermittent shortness of breath that occurred yesterday when walking through her home.  She had associated nausea and headache and diaphoresis.  She reports blood pressure at that time was 160/80.  Some improvement with rest.  Onset 3 days ago.  She reports shortness of breath a few times yesterday with continued headache.  She complains of headache today without any shortness of breath nausea or diaphoresis.  She denies any chest pain.  She states her grandchild has had a viral illness recently.    9/27/2019 cardiac catheterization angiographically normal coronary arteries with normal LV systolic function (LVEF 65-70%), no AS, no MR.    8/7/2019 stress echo [facility] was abnormal showing small area of moderate ischemia in the middle anterior wall.  Normal LV systolic function at 52%.  Overall these represent low risk findings.    Allergic rhinitis-stable with Xyzal    Vitamin D deficiency-stable with vitamin D supplementation    Restless leg syndrome-stable with verapamil    The following portions of the patient's history were reviewed and updated as appropriate: allergies, current medications, past family history, past medical history, past social history, past surgical history and problem list.    Review of Systems   Constitutional: Positive for activity change and fatigue. Negative for appetite change and fever.   HENT: Negative for ear pain, sinus pressure and sore throat.    Eyes: Negative for pain and visual disturbance.   Respiratory: Positive for shortness of breath. Negative for cough and chest tightness.    Cardiovascular: Negative for chest pain and palpitations.   Gastrointestinal: Positive for nausea. Negative for abdominal pain, constipation, diarrhea and vomiting.   Endocrine: Negative for polydipsia and  "polyuria.   Genitourinary: Negative for dysuria and frequency.   Musculoskeletal: Negative for back pain and myalgias.   Skin: Negative for color change and rash.   Allergic/Immunologic: Negative for food allergies and immunocompromised state.   Neurological: Positive for headaches. Negative for dizziness and syncope.   Hematological: Negative for adenopathy. Does not bruise/bleed easily.   Psychiatric/Behavioral: Negative for hallucinations and suicidal ideas. The patient is not nervous/anxious.        Objective  Vital signs:  /82   Pulse 74   Temp 96.8 °F (36 °C) (Temporal)   Ht 162.6 cm (64.02\")   Wt 103 kg (226 lb)   SpO2 97%   BMI 38.77 kg/m²     Physical Exam  Vitals and nursing note reviewed.   Constitutional:       Appearance: Normal appearance. She is well-developed. She is obese.   HENT:      Head: Normocephalic and atraumatic.      Right Ear: Tympanic membrane normal.      Left Ear: Tympanic membrane normal.      Nose: Nose normal.      Mouth/Throat:      Mouth: Mucous membranes are moist.      Pharynx: No oropharyngeal exudate or posterior oropharyngeal erythema.   Eyes:      Extraocular Movements: Extraocular movements intact.      Conjunctiva/sclera: Conjunctivae normal.   Neck:      Thyroid: No thyromegaly.      Trachea: No tracheal deviation.   Cardiovascular:      Rate and Rhythm: Normal rate and regular rhythm.      Heart sounds: Normal heart sounds. No murmur heard.     Pulmonary:      Effort: Pulmonary effort is normal. No respiratory distress.      Breath sounds: Normal breath sounds. No wheezing.   Abdominal:      General: Bowel sounds are normal.      Palpations: Abdomen is soft.      Tenderness: There is no abdominal tenderness. There is no guarding.   Musculoskeletal:         General: No tenderness. Normal range of motion.      Cervical back: Normal range of motion and neck supple.   Lymphadenopathy:      Cervical: No cervical adenopathy.   Skin:     General: Skin is warm and " dry.      Findings: No rash.   Neurological:      General: No focal deficit present.      Mental Status: She is alert and oriented to person, place, and time.   Psychiatric:         Mood and Affect: Mood normal.         Behavior: Behavior normal.         Thought Content: Thought content normal.         The following data was reviewed by: MAIK An on 07/12/2021:  CMP    CMP 10/6/20   Glucose 92   BUN 10   Creatinine 0.70   eGFR Non African Am 86   Sodium 140   Potassium 4.2   Chloride 102   Calcium 9.7   Albumin 4.40   Total Bilirubin 0.4   Alkaline Phosphatase 96   AST (SGOT) 24   ALT (SGPT) 17             Lipid Panel    Lipid Panel 10/6/20   Total Cholesterol 164   Triglycerides 117   HDL Cholesterol 55   VLDL Cholesterol 23.4   LDL Cholesterol  86   LDL/HDL Ratio 1.56               Most Recent A1C    HGBA1C Most Recent 10/6/20   Hemoglobin A1C 5.57           Data reviewed: Cardiology studies Cardiac catheterization 9/27/2019 8/7/2019 EKG reviewed  8/7/2019 stress echo reviewed    Assessment/Plan     Diagnoses and all orders for this visit:    1. Shortness of breath (Primary)  Comments:  Likely secondary to viral illness  Symptomatic care advised  EKG reviewed  Order chest x-ray for further evaluation  RTC/go to ER if symptoms persist or worsen  Orders:  -     XR Chest PA & Lateral; Future  -     ECG 12 Lead    2. Non-seasonal allergic rhinitis due to pollen  Comments:  Continue Xyzal    3. Vitamin D deficiency  Comments:  Continue vitamin D supplementation    4. Restless legs  Comments:  Continue verapamil  Advised good sleep habits    We discussed differential diagnosis for her shortness of breath.  Likely secondary to viral illness due to her being exposed to a grandchild with viral illness and having these nonspecific symptoms.  We also discussed possibility of cardiac or pulmonary origin.  Since she has previously had Covid19 that may also have residual inflammatory effects.  Patient is aware  that if symptoms persist or worsen that she should go to the ER for immediate evaluation or return to clinic.    July 12, 2021 at 9:47 AM  EKG  Performed by: Isabella Norman CMA  Authorized and interpreted by: Aisha Coffman PA-C  Comparison: Compared to 8/7/2019 EKG  Similar to previous EKG  Previous EKG:Previous EKG showed normal sinus rhythm with normal EKG ventricular rate 70 bpm  Rhythm: Sinus rhythm  Rate: Normal at 68 bpm  QRS Axis: Normal  Conduction: Conduction normal  ST segments: ST segments normal  T waves: T waves normal  Clinical impression: Normal EKG        Patient was given instructions and counseling regarding his condition or for health maintenance advice. Please see specific information pulled into the AVS if appropriate      This document has been electronically signed by:  Aisha Coffman PA-C

## 2021-08-27 ENCOUNTER — OFFICE VISIT (OUTPATIENT)
Dept: FAMILY MEDICINE CLINIC | Facility: CLINIC | Age: 61
End: 2021-08-27

## 2021-08-27 VITALS
SYSTOLIC BLOOD PRESSURE: 128 MMHG | HEIGHT: 64 IN | HEART RATE: 70 BPM | TEMPERATURE: 96.8 F | DIASTOLIC BLOOD PRESSURE: 72 MMHG | OXYGEN SATURATION: 99 % | BODY MASS INDEX: 38.77 KG/M2

## 2021-08-27 DIAGNOSIS — E55.9 VITAMIN D DEFICIENCY: Chronic | ICD-10-CM

## 2021-08-27 DIAGNOSIS — Z13.220 SCREENING FOR HYPERLIPIDEMIA: ICD-10-CM

## 2021-08-27 DIAGNOSIS — E66.01 CLASS 2 SEVERE OBESITY DUE TO EXCESS CALORIES WITH SERIOUS COMORBIDITY AND BODY MASS INDEX (BMI) OF 38.0 TO 38.9 IN ADULT (HCC): Chronic | ICD-10-CM

## 2021-08-27 DIAGNOSIS — G47.62 NOCTURNAL LEG CRAMPS: Chronic | ICD-10-CM

## 2021-08-27 DIAGNOSIS — M81.0 SENILE OSTEOPOROSIS: Chronic | ICD-10-CM

## 2021-08-27 DIAGNOSIS — R42 VERTIGO: Primary | ICD-10-CM

## 2021-08-27 DIAGNOSIS — K21.9 GERD WITHOUT ESOPHAGITIS: ICD-10-CM

## 2021-08-27 DIAGNOSIS — B02.23 POST-HERPETIC POLYNEUROPATHY: ICD-10-CM

## 2021-08-27 DIAGNOSIS — G25.81 RESTLESS LEGS: Chronic | ICD-10-CM

## 2021-08-27 PROBLEM — E66.812 CLASS 2 SEVERE OBESITY DUE TO EXCESS CALORIES WITH SERIOUS COMORBIDITY AND BODY MASS INDEX (BMI) OF 38.0 TO 38.9 IN ADULT: Status: ACTIVE | Noted: 2021-08-27

## 2021-08-27 PROCEDURE — 99214 OFFICE O/P EST MOD 30 MIN: CPT | Performed by: PHYSICIAN ASSISTANT

## 2021-08-27 RX ORDER — MECLIZINE HYDROCHLORIDE 25 MG/1
25 TABLET ORAL 3 TIMES DAILY PRN
Qty: 90 TABLET | Refills: 0 | Status: SHIPPED | OUTPATIENT
Start: 2021-08-27 | End: 2022-06-03

## 2021-08-27 RX ORDER — PANTOPRAZOLE SODIUM 40 MG/1
40 TABLET, DELAYED RELEASE ORAL DAILY
Qty: 90 TABLET | Refills: 3 | Status: SHIPPED | OUTPATIENT
Start: 2021-08-27 | End: 2021-12-03 | Stop reason: SDUPTHER

## 2021-08-27 NOTE — PROGRESS NOTES
"Subjective   Jennifer Horne is a 61 y.o. female.       Chief Complaint -dizziness    History of Present Illness -    ROS    Dizziness-  Patient complains of intermittent mild dizziness due to vertigo.  Patient reports that symptoms have been controlled with meclizine in the past.  She denies any otalgia, fever or sore throat.    Gastroesophageal reflux disease-stable with Protonix    Leg cramps-stable with verapamil    Vitamin D deficiency-stable with vitamin D supplementation    Osteoporosis-stable with Evista, calcium and vitamin D supplementation    Restless leg syndrome-stable with verapamil which significantly helps her leg cramps    Obesity-  Not at goal as she has not been able to be as active due to her left foot fracture.  She is currently in a left foot walking boot.  She is being followed by [facility].  She is status post Alba-en-Y bariatric surgery.    The following portions of the patient's history were reviewed and updated as appropriate: allergies, current medications, past family history, past medical history, past social history, past surgical history and problem list.    Review of Systems   Constitutional: Positive for activity change.   Musculoskeletal: Positive for arthralgias, gait problem and myalgias.   Neurological: Positive for dizziness.   Psychiatric/Behavioral: Positive for sleep disturbance.       Objective  Vital signs:  /72   Pulse 70   Temp 96.8 °F (36 °C) (Temporal)   Ht 162.6 cm (64.02\")   SpO2 99%   BMI 38.77 kg/m²     Physical Exam  Vitals and nursing note reviewed.   Constitutional:       Appearance: Normal appearance. She is well-developed. She is obese.   HENT:      Head: Normocephalic and atraumatic.      Right Ear: Tympanic membrane normal.      Left Ear: Tympanic membrane normal.      Nose: Nose normal.      Mouth/Throat:      Mouth: Mucous membranes are moist.      Pharynx: No oropharyngeal exudate or posterior oropharyngeal erythema. "   Eyes:      Extraocular Movements: Extraocular movements intact.      Conjunctiva/sclera: Conjunctivae normal.   Neck:      Thyroid: No thyromegaly.      Trachea: No tracheal deviation.   Cardiovascular:      Rate and Rhythm: Normal rate and regular rhythm.      Heart sounds: Normal heart sounds. No murmur heard.     Pulmonary:      Effort: Pulmonary effort is normal. No respiratory distress.      Breath sounds: Normal breath sounds. No wheezing.   Abdominal:      General: Bowel sounds are normal.      Palpations: Abdomen is soft.      Tenderness: There is no abdominal tenderness. There is no guarding.   Musculoskeletal:         General: No tenderness.      Cervical back: Normal range of motion and neck supple.      Right lower leg: No edema.      Left lower leg: No edema.      Comments: Left lower extremity is in walking boot today.  Gait is abnormal due to walking   Lymphadenopathy:      Cervical: No cervical adenopathy.   Skin:     General: Skin is warm and dry.      Findings: No rash.   Neurological:      General: No focal deficit present.      Mental Status: She is alert and oriented to person, place, and time.   Psychiatric:         Mood and Affect: Mood normal.         Behavior: Behavior normal.         Thought Content: Thought content normal.         The following data was reviewed by: MAIK An on 08/27/2021:  CMP    CMP 10/6/20   Glucose 92   BUN 10   Creatinine 0.70   eGFR Non African Am 86   Sodium 140   Potassium 4.2   Chloride 102   Calcium 9.7   Albumin 4.40   Total Bilirubin 0.4   Alkaline Phosphatase 96   AST (SGOT) 24   ALT (SGPT) 17             Lipid Panel    Lipid Panel 10/6/20   Total Cholesterol 164   Triglycerides 117   HDL Cholesterol 55   VLDL Cholesterol 23.4   LDL Cholesterol  86   LDL/HDL Ratio 1.56               Most Recent A1C    HGBA1C Most Recent 10/6/20   Hemoglobin A1C 5.57                  Assessment/Plan     Diagnoses and all orders for this visit:    1. Vertigo  (Primary)  Comments:  Start meclizine as needed  Symptomatic care advised and fall risk precautions advised  Orders:  -     meclizine (ANTIVERT) 25 MG tablet; Take 1 tablet by mouth 3 (Three) Times a Day As Needed for Dizziness.  Dispense: 90 tablet; Refill: 0    2. GERD without esophagitis  Comments:  Continue Protonix  Orders:  -     pantoprazole (PROTONIX) 40 MG EC tablet; Take 1 tablet by mouth Daily.  Dispense: 90 tablet; Refill: 3  -     Comprehensive Metabolic Panel; Future    3. Nocturnal leg cramps  Comments:  Continue verapamil (CCB)  and rx compounded cream prn  Orders:  -     verapamil SR (CALAN-SR) 120 MG CR tablet; Take 1 tablet by mouth Every Night.  Dispense: 90 tablet; Refill: 3    4. Vitamin D deficiency  Comments:  Advised vitamin D supplementation  Orders:  -     Vitamin D 25 Hydroxy; Future    5. Senile osteoporosis  Comments:  Continue Evista calcium and vitamin D supplementation  Fall risk precautions advised  Orders:  -     Vitamin D 25 Hydroxy; Future    6. Restless legs  Comments:  Continue verapamil    7. Screening for hyperlipidemia  -     Lipid Panel; Future    8. Class 2 severe obesity due to excess calories with serious comorbidity and body mass index (BMI) of 38.0 to 38.9 in adult (CMS/[facility])  Comments:  Advised activity as tolerated  Advised low carbohydrate diet    Orders:  -     TSH; Future  -     Hemoglobin A1c; Future            Patient was given instructions and counseling regarding his condition or for health maintenance advice. Please see specific information pulled into the AVS if appropriate      This document has been electronically signed by:  Aisha Coffman PA-C

## 2021-08-29 RX ORDER — GABAPENTIN 300 MG/1
300 CAPSULE ORAL 3 TIMES DAILY
Qty: 90 CAPSULE | Refills: 1 | Status: SHIPPED | OUTPATIENT
Start: 2021-08-29 | End: 2021-12-03 | Stop reason: SDUPTHER

## 2021-08-29 NOTE — PATIENT INSTRUCTIONS
"https://www.diabeteseducator.org/docs/default-source/living-with-diabetes/conquering-the-grocery-store-v1.pdf?sfvrsn=4\">   Carbohydrate Counting for Diabetes Mellitus, Adult  Carbohydrate counting is a method of keeping track of how many carbohydrates you eat. Eating carbohydrates naturally increases the amount of sugar (glucose) in the blood. Counting how many carbohydrates you eat improves your blood glucose control, which helps you manage your diabetes.  It is important to know how many carbohydrates you can safely have in each meal. This is different for every person. A dietitian can help you make a meal plan and calculate how many carbohydrates you should have at each meal and snack.  What foods contain carbohydrates?  Carbohydrates are found in the following foods:  · Grains, such as breads and cereals.  · Dried beans and soy products.  · Starchy vegetables, such as potatoes, peas, and corn.  · Fruit and fruit juices.  · Milk and yogurt.  · Sweets and snack foods, such as cake, cookies, candy, chips, and soft drinks.  How do I count carbohydrates in foods?  There are two ways to count carbohydrates in food. You can read food labels or learn standard serving sizes of foods. You can use either of the methods or a combination of both.  Using the Nutrition Facts label  The Nutrition Facts list is included on the labels of almost all packaged foods and beverages in the [place] It includes:  · The serving size.  · Information about nutrients in each serving, including the grams (g) of carbohydrate per serving.  To use the Nutrition Facts:  · Decide how many servings you will have.  · Multiply the number of servings by the number of carbohydrates per serving.  · The resulting number is the total amount of carbohydrates that you will be having.  Learning the standard serving sizes of foods  When you eat carbohydrate foods that are not packaged or do not include Nutrition Facts on the label, you need to measure the " servings in order to count the amount of carbohydrates.  · Measure the foods that you will eat with a food scale or measuring cup, if needed.  · Decide how many standard-size servings you will eat.  · Multiply the number of servings by 15. For foods that contain carbohydrates, one serving equals 15 g of carbohydrates.  ? For example, if you eat 2 cups or 10 oz (300 g) of strawberries, you will have eaten 2 servings and 30 g of carbohydrates (2 servings x 15 g = 30 g).  · For foods that have more than one food mixed, such as soups and casseroles, you must count the carbohydrates in each food that is included.  The following list contains standard serving sizes of common carbohydrate-rich foods. Each of these servings has about 15 g of carbohydrates:  · 1 slice of bread.  · 1 six-inch (15 cm) tortilla.  · ? cup or 2 oz (53 g) cooked rice or pasta.  · ½ cup or 3 oz (85 g) cooked or canned, drained and rinsed beans or lentils.  · ½ cup or 3 oz (85 g) starchy vegetable, such as peas, corn, or squash.  · ½ cup or 4 oz (120 g) hot cereal.  · ½ cup or 3 oz (85 g) boiled or mashed potatoes, or ¼ or 3 oz (85 g) of a large baked potato.  · ½ cup or 4 fl oz (118 mL) fruit juice.  · 1 cup or 8 fl oz (237 mL) milk.  · 1 small or 4 oz (106 g) apple.  · ½ or 2 oz (63 g) of a medium banana.  · 1 cup or 5 oz (150 g) strawberries.  · 3 cups or 1 oz (24 g) popped popcorn.  What is an example of carbohydrate counting?  To calculate the number of carbohydrates in this sample meal, follow the steps shown below.  Sample meal  · 3 oz (85 g) chicken breast.  · ? cup or 4 oz (106 g) brown rice.  · ½ cup or 3 oz (85 g) corn.  · 1 cup or 8 fl oz (237 mL) milk.  · 1 cup or 5 oz (150 g) strawberries with sugar-free whipped topping.  Carbohydrate calculation  1. Identify the foods that contain carbohydrates:  ? Rice.  ? Corn.  ? Milk.  ? Strawberries.  2. Calculate how many servings you have of each food:  ? 2 servings rice.  ? 1 serving  corn.  ? 1 serving milk.  ? 1 serving strawberries.  3. Multiply each number of servings by 15 g:  ? 2 servings rice x 15 g = 30 g.  ? 1 serving corn x 15 g = 15 g.  ? 1 serving milk x 15 g = 15 g.  ? 1 serving strawberries x 15 g = 15 g.  4. Add together all of the amounts to find the total grams of carbohydrates eaten:  ? 30 g + 15 g + 15 g + 15 g = 75 g of carbohydrates total.  What are tips for following this plan?  Shopping  · Develop a meal plan and then make a shopping list.  · Buy fresh and frozen vegetables, fresh and frozen fruit, dairy, eggs, beans, lentils, and whole grains.  · Look at food labels. Choose foods that have more fiber and less sugar.  · Avoid processed foods and foods with added sugars.  Meal planning  · Aim to have the same amount of carbohydrates at each meal and for each snack time.  · Plan to have regular, balanced meals and snacks.  Where to find more information  · American Diabetes Association: www.diabetes.org  · Centers for Disease Control and Prevention: www.cdc.gov  Summary  · Carbohydrate counting is a method of keeping track of how many carbohydrates you eat.  · Eating carbohydrates naturally increases the amount of sugar (glucose) in the blood.  · Counting how many carbohydrates you eat improves your blood glucose control, which helps you manage your diabetes.  · A dietitian can help you make a meal plan and calculate how many carbohydrates you should have at each meal and snack.  This information is not intended to replace advice given to you by your health care provider. Make sure you discuss any questions you have with your health care provider.  Document Revised: 12/17/2020 Document Reviewed: 12/18/2020  [employer] Online Patient Education © 2021 [employer]      Fall Prevention in the Home, Adult  Falls can cause injuries. They can happen to people of all ages. There are many things you can do to make your home safe and to help prevent falls. Ask for help when making these  changes, if needed.  What actions can I take to prevent falls?  General Instructions  · Use good lighting in all rooms. Replace any light bulbs that burn out.  · Turn on the lights when you go into a dark area. Use night-lights.  · Keep items that you use often in easy-to-reach places. Lower the shelves around your home if necessary.  · Set up your furniture so you have a clear path. Avoid moving your furniture around.  · Do not have throw rugs and other things on the floor that can make you trip.  · Avoid walking on wet floors.  · If any of your floors are uneven, fix them.  · Add color or contrast paint or tape to clearly nhan and help you see:  ? Any grab bars or handrails.  ? First and last steps of stairways.  ? Where the edge of each step is.  · If you use a stepladder:  ? Make sure that it is fully opened. Do not climb a closed stepladder.  ? Make sure that both sides of the stepladder are locked into place.  ? Ask someone to hold the stepladder for you while you use it.  · If there are any pets around you, be aware of where they are.  What can I do in the bathroom?         · Keep the floor dry. Clean up any water that spills onto the floor as soon as it happens.  · Remove soap buildup in the tub or shower regularly.  · Use non-skid mats or decals on the floor of the tub or shower.  · Attach bath mats securely with double-sided, non-slip rug tape.  · If you need to sit down in the shower, use a plastic, non-slip stool.  · Install grab bars by the toilet and in the tub and shower. Do not use towel bars as grab bars.  What can I do in the bedroom?  · Make sure that you have a light by your bed that is easy to reach.  · Do not use any sheets or blankets that are too big for your bed. They should not hang down onto the floor.  · Have a firm chair that has side arms. You can use this for support while you get dressed.  What can I do in the kitchen?  · Clean up any spills right away.  · If you need to reach  something above you, use a strong step stool that has a grab bar.  · Keep electrical cords out of the way.  · Do not use floor polish or wax that makes floors slippery. If you must use wax, use non-skid floor wax.  What can I do with my stairs?  · Do not leave any items on the stairs.  · Make sure that you have a light switch at the top of the stairs and the bottom of the stairs. If you do not have them, ask someone to add them for you.  · Make sure that there are handrails on both sides of the stairs, and use them. Fix handrails that are broken or loose. Make sure that handrails are as long as the stairways.  · Install non-slip stair treads on all stairs in your home.  · Avoid having throw rugs at the top or bottom of the stairs. If you do have throw rugs, attach them to the floor with carpet tape.  · Choose a carpet that does not hide the edge of the steps on the stairway.  · Check any carpeting to make sure that it is firmly attached to the stairs. Fix any carpet that is loose or worn.  What can I do on the outside of my home?  · Use bright outdoor lighting.  · Regularly fix the edges of walkways and driveways and fix any cracks.  · Remove anything that might make you trip as you walk through a door, such as a raised step or threshold.  · Trim any bushes or trees on the path to your home.  · Regularly check to see if handrails are loose or broken. Make sure that both sides of any steps have handrails.  · Install guardrails along the edges of any raised decks and porches.  · Clear walking paths of anything that might make someone trip, such as tools or rocks.  · Have any leaves, snow, or ice cleared regularly.  · Use sand or salt on walking paths during winter.  · Clean up any spills in your garage right away. This includes grease or oil spills.  What other actions can I take?  · Wear shoes that:  ? Have a low heel. Do not wear high heels.  ? Have rubber bottoms.  ? Are comfortable and fit you well.  ? Are closed  at the toe. Do not wear open-toe sandals.  · Use tools that help you move around (mobility aids) if they are needed. These include:  ? Canes.  ? Walkers.  ? Scooters.  ? Crutches.  · Review your medicines with your doctor. Some medicines can make you feel dizzy. This can increase your chance of falling.  Ask your doctor what other things you can do to help prevent falls.  Where to find more information  · Centers for Disease Control and Prevention, [employer]: https://cdc.gov  · National [place] on Aging: https://td1unfv.[place].nih.gov  Contact a doctor if:  · You are afraid of falling at home.  · You feel weak, drowsy, or dizzy at home.  · You fall at home.  Summary  · There are many simple things that you can do to make your home safe and to help prevent falls.  · Ways to make your home safe include removing tripping hazards and installing grab bars in the bathroom.  · Ask for help when making these changes in your home.  This information is not intended to replace advice given to you by your health care provider. Make sure you discuss any questions you have with your health care provider.  Document Revised: 04/09/2020 Document Reviewed: 08/02/2018  Elsevier Patient Education © 2021 [employer]

## 2021-09-07 ENCOUNTER — HOSPITAL ENCOUNTER (OUTPATIENT)
Dept: GENERAL RADIOLOGY | Facility: HOSPITAL | Age: 61
Discharge: HOME | End: 2021-09-07
Admitting: PHYSICIAN ASSISTANT

## 2021-09-07 DIAGNOSIS — R06.02 SHORTNESS OF BREATH: Primary | ICD-10-CM

## 2021-09-07 DIAGNOSIS — R06.02 SHORTNESS OF BREATH: ICD-10-CM

## 2021-09-07 PROCEDURE — 71046 X-RAY EXAM CHEST 2 VIEWS: CPT | Performed by: RADIOLOGY

## 2021-09-07 PROCEDURE — 71046 X-RAY EXAM CHEST 2 VIEWS: CPT

## 2021-11-19 ENCOUNTER — FLU SHOT (OUTPATIENT)
Dept: FAMILY MEDICINE CLINIC | Facility: CLINIC | Age: 61
End: 2021-11-19

## 2021-11-19 ENCOUNTER — LAB (OUTPATIENT)
Dept: FAMILY MEDICINE CLINIC | Facility: CLINIC | Age: 61
End: 2021-11-19

## 2021-11-19 ENCOUNTER — HOSPITAL ENCOUNTER (OUTPATIENT)
Dept: MAMMOGRAPHY | Facility: HOSPITAL | Age: 61
Discharge: HOME | End: 2021-11-19
Admitting: PHYSICIAN ASSISTANT

## 2021-11-19 DIAGNOSIS — K21.9 GERD WITHOUT ESOPHAGITIS: ICD-10-CM

## 2021-11-19 DIAGNOSIS — Z13.220 SCREENING FOR HYPERLIPIDEMIA: ICD-10-CM

## 2021-11-19 DIAGNOSIS — E66.01 CLASS 2 SEVERE OBESITY DUE TO EXCESS CALORIES WITH SERIOUS COMORBIDITY AND BODY MASS INDEX (BMI) OF 38.0 TO 38.9 IN ADULT (HCC): Chronic | ICD-10-CM

## 2021-11-19 DIAGNOSIS — Z12.31 VISIT FOR SCREENING MAMMOGRAM: ICD-10-CM

## 2021-11-19 DIAGNOSIS — M81.0 SENILE OSTEOPOROSIS: Chronic | ICD-10-CM

## 2021-11-19 DIAGNOSIS — Z23 ENCOUNTER FOR IMMUNIZATION: Primary | ICD-10-CM

## 2021-11-19 DIAGNOSIS — E55.9 VITAMIN D DEFICIENCY: Chronic | ICD-10-CM

## 2021-11-19 LAB
25(OH)D3 SERPL-MCNC: 61.3 NG/ML
ALBUMIN SERPL-MCNC: 4.5 G/DL (ref 3.5–5.2)
ALBUMIN/GLOB SERPL: 1.6 G/DL
ALP SERPL-CCNC: 91 U/L (ref 39–117)
ALT SERPL W P-5'-P-CCNC: 17 U/L (ref 1–33)
ANION GAP SERPL CALCULATED.3IONS-SCNC: 7.8 MMOL/L (ref 5–15)
AST SERPL-CCNC: 19 U/L (ref 1–32)
BILIRUB SERPL-MCNC: 0.5 MG/DL (ref 0–1.2)
BUN SERPL-MCNC: 12 MG/DL (ref 6–20)
BUN/CREAT SERPL: 16.7 (ref 7–25)
CALCIUM SPEC-SCNC: 9.6 MG/DL (ref 8.6–10.5)
CHLORIDE SERPL-SCNC: 103 MMOL/L (ref 98–107)
CHOLEST SERPL-MCNC: 187 MG/DL (ref 0–200)
CO2 SERPL-SCNC: 28.2 MMOL/L (ref 22–29)
CREAT SERPL-MCNC: 0.72 MG/DL (ref 0.57–1)
GFR SERPL CREATININE-BSD FRML MDRD: 83 ML/MIN/1.73
GLOBULIN UR ELPH-MCNC: 2.9 GM/DL
GLUCOSE SERPL-MCNC: 93 MG/DL (ref 65–99)
HBA1C MFR BLD: 5.7 % (ref 4.8–5.6)
HDLC SERPL-MCNC: 53 MG/DL (ref 40–60)
LDLC SERPL CALC-MCNC: 113 MG/DL (ref 0–100)
LDLC/HDLC SERPL: 2.08 {RATIO}
POTASSIUM SERPL-SCNC: 4.3 MMOL/L (ref 3.5–5.2)
PROT SERPL-MCNC: 7.4 G/DL (ref 6–8.5)
SODIUM SERPL-SCNC: 139 MMOL/L (ref 136–145)
TRIGL SERPL-MCNC: 118 MG/DL (ref 0–150)
TSH SERPL DL<=0.05 MIU/L-ACNC: 1.33 UIU/ML (ref 0.27–4.2)
VLDLC SERPL-MCNC: 21 MG/DL (ref 5–40)

## 2021-11-19 PROCEDURE — 83036 HEMOGLOBIN GLYCOSYLATED A1C: CPT | Performed by: PHYSICIAN ASSISTANT

## 2021-11-19 PROCEDURE — 90471 IMMUNIZATION ADMIN: CPT | Performed by: PHYSICIAN ASSISTANT

## 2021-11-19 PROCEDURE — 90686 IIV4 VACC NO PRSV 0.5 ML IM: CPT | Performed by: PHYSICIAN ASSISTANT

## 2021-11-19 PROCEDURE — 84443 ASSAY THYROID STIM HORMONE: CPT | Performed by: PHYSICIAN ASSISTANT

## 2021-11-19 PROCEDURE — 77067 SCR MAMMO BI INCL CAD: CPT | Performed by: RADIOLOGY

## 2021-11-19 PROCEDURE — 77063 BREAST TOMOSYNTHESIS BI: CPT

## 2021-11-19 PROCEDURE — 77063 BREAST TOMOSYNTHESIS BI: CPT | Performed by: RADIOLOGY

## 2021-11-19 PROCEDURE — 80053 COMPREHEN METABOLIC PANEL: CPT | Performed by: PHYSICIAN ASSISTANT

## 2021-11-19 PROCEDURE — 77067 SCR MAMMO BI INCL CAD: CPT

## 2021-11-19 PROCEDURE — 82306 VITAMIN D 25 HYDROXY: CPT | Performed by: PHYSICIAN ASSISTANT

## 2021-11-19 PROCEDURE — 80061 LIPID PANEL: CPT | Performed by: PHYSICIAN ASSISTANT

## 2021-11-19 NOTE — PROGRESS NOTES
Injection  Injection performed in left arm by Isabella Norman MA. Patient tolerated the procedure well without complications.  11/19/21   Isabella Norman MA

## 2021-12-03 ENCOUNTER — OFFICE VISIT (OUTPATIENT)
Dept: FAMILY MEDICINE CLINIC | Facility: CLINIC | Age: 61
End: 2021-12-03

## 2021-12-03 VITALS
SYSTOLIC BLOOD PRESSURE: 124 MMHG | BODY MASS INDEX: 39.44 KG/M2 | HEIGHT: 64 IN | HEART RATE: 81 BPM | DIASTOLIC BLOOD PRESSURE: 80 MMHG | TEMPERATURE: 97 F | OXYGEN SATURATION: 98 % | WEIGHT: 231 LBS

## 2021-12-03 DIAGNOSIS — J30.1 NON-SEASONAL ALLERGIC RHINITIS DUE TO POLLEN: ICD-10-CM

## 2021-12-03 DIAGNOSIS — K21.9 GERD WITHOUT ESOPHAGITIS: ICD-10-CM

## 2021-12-03 DIAGNOSIS — M62.838 MUSCLE SPASM OF BOTH LOWER LEGS: Chronic | ICD-10-CM

## 2021-12-03 DIAGNOSIS — J30.89 CHRONIC NONSEASONAL ALLERGIC RHINITIS DUE TO POLLEN: Chronic | ICD-10-CM

## 2021-12-03 DIAGNOSIS — G47.62 NOCTURNAL LEG CRAMPS: Chronic | ICD-10-CM

## 2021-12-03 DIAGNOSIS — E78.2 MIXED HYPERLIPIDEMIA: Chronic | ICD-10-CM

## 2021-12-03 DIAGNOSIS — R73.03 PREDIABETES: Primary | Chronic | ICD-10-CM

## 2021-12-03 DIAGNOSIS — M85.89 OSTEOPENIA OF MULTIPLE SITES: Chronic | ICD-10-CM

## 2021-12-03 DIAGNOSIS — B02.23 POST-HERPETIC POLYNEUROPATHY: ICD-10-CM

## 2021-12-03 PROCEDURE — 99214 OFFICE O/P EST MOD 30 MIN: CPT | Performed by: PHYSICIAN ASSISTANT

## 2021-12-03 RX ORDER — LEVOCETIRIZINE DIHYDROCHLORIDE 5 MG/1
5 TABLET, FILM COATED ORAL EVERY MORNING
Qty: 90 TABLET | Refills: 3 | Status: SHIPPED | OUTPATIENT
Start: 2021-12-03 | End: 2022-09-02 | Stop reason: SDUPTHER

## 2021-12-03 RX ORDER — FLUTICASONE PROPIONATE 50 MCG
SPRAY, SUSPENSION (ML) NASAL
Qty: 18.2 ML | Refills: 5 | Status: SHIPPED | OUTPATIENT
Start: 2021-12-03 | End: 2022-09-02 | Stop reason: SDUPTHER

## 2021-12-03 RX ORDER — PANTOPRAZOLE SODIUM 40 MG/1
40 TABLET, DELAYED RELEASE ORAL DAILY
Qty: 90 TABLET | Refills: 3 | Status: SHIPPED | OUTPATIENT
Start: 2021-12-03 | End: 2022-09-02 | Stop reason: SDUPTHER

## 2021-12-03 RX ORDER — TIZANIDINE 4 MG/1
4 TABLET ORAL NIGHTLY PRN
Qty: 90 TABLET | Refills: 3 | Status: SHIPPED | OUTPATIENT
Start: 2021-12-03 | End: 2022-09-02 | Stop reason: SDUPTHER

## 2021-12-03 RX ORDER — RALOXIFENE HYDROCHLORIDE 60 MG/1
60 TABLET, FILM COATED ORAL DAILY
Qty: 90 TABLET | Refills: 3 | Status: SHIPPED | OUTPATIENT
Start: 2021-12-03 | End: 2021-12-13

## 2021-12-04 RX ORDER — GABAPENTIN 300 MG/1
300 CAPSULE ORAL 3 TIMES DAILY
Qty: 90 CAPSULE | Refills: 1 | Status: SHIPPED | OUTPATIENT
Start: 2021-12-04 | End: 2022-05-28

## 2021-12-05 NOTE — PATIENT INSTRUCTIONS
"Fat and Cholesterol Restricted Eating Plan  Getting too much fat and cholesterol in your diet may cause health problems. Choosing the right foods helps keep your fat and cholesterol at normal levels. This can keep you from getting certain diseases.  Your doctor may recommend an eating plan that includes:  · Total fat: ______% or less of total calories a day.  · Saturated fat: ______% or less of total calories a day.  · Cholesterol: less than _________mg a day.  · Fiber: ______g a day.  What are tips for following this plan?  Meal planning  · At meals, divide your plate into four equal parts:  ? Fill one-half of your plate with vegetables and green salads.  ? Fill one-fourth of your plate with whole grains.  ? Fill one-fourth of your plate with low-fat (lean) protein foods.  · Eat fish that is high in omega-3 fats at least two times a week. This includes mackerel, tuna, sardines, and salmon.  · Eat foods that are high in fiber, such as whole grains, beans, apples, broccoli, carrots, peas, and barley.  General tips    · Work with your doctor to lose weight if you need to.  · Avoid:  ? Foods with added sugar.  ? Fried foods.  ? Foods with partially hydrogenated oils.  · Limit alcohol intake to no more than 1 drink a day for nonpregnant women and 2 drinks a day for men. One drink equals 12 oz of beer, 5 oz of wine, or 1½ oz of hard liquor.    Reading food labels  · Check food labels for:  ? Trans fats.  ? Partially hydrogenated oils.  ? Saturated fat (g) in each serving.  ? Cholesterol (mg) in each serving.  ? Fiber (g) in each serving.  · Choose foods with healthy fats, such as:  ? Monounsaturated fats.  ? Polyunsaturated fats.  ? Omega-3 fats.  · Choose grain products that have whole grains. Look for the word \"whole\" as the first word in the ingredient list.  Cooking  · Cook foods using low-fat methods. These include baking, boiling, grilling, and broiling.  · Eat more home-cooked foods. Eat at restaurants and buffets " less often.  · Avoid cooking using saturated fats, such as butter, cream, palm oil, palm kernel oil, and coconut oil.  Recommended foods    Fruits  · All fresh, canned (in natural juice), or frozen fruits.  Vegetables  · Fresh or frozen vegetables (raw, steamed, roasted, or grilled). Green salads.  Grains  · Whole grains, such as whole wheat or whole grain breads, crackers, cereals, and pasta. Unsweetened oatmeal, bulgur, barley, quinoa, or brown rice. Corn or whole wheat flour tortillas.  Meats and other protein foods  · Ground beef (85% or leaner), grass-fed beef, or beef trimmed of fat. Skinless chicken or turkey. Ground chicken or turkey. Pork trimmed of fat. All fish and seafood. Egg whites. Dried beans, peas, or lentils. Unsalted nuts or seeds. Unsalted canned beans. Nut butters without added sugar or oil.  Dairy  · Low-fat or nonfat dairy products, such as skim or 1% milk, 2% or reduced-fat cheeses, low-fat and fat-free ricotta or cottage cheese, or plain low-fat and nonfat yogurt.  Fats and oils  · Tub margarine without trans fats. Light or reduced-fat mayonnaise and salad dressings. Avocado. Olive, canola, sesame, or safflower oils.  The items listed above may not be a complete list of foods and beverages you can eat. Contact a dietitian for more information.  Foods to avoid  Fruits  · Canned fruit in heavy syrup. Fruit in cream or butter sauce. Fried fruit.  Vegetables  · Vegetables cooked in cheese, cream, or butter sauce. Fried vegetables.  Grains  · White bread. White pasta. White rice. Cornbread. Bagels, pastries, and croissants. Crackers and snack foods that contain trans fat and hydrogenated oils.  Meats and other protein foods  · Fatty cuts of meat. Ribs, chicken wings, kennedy, sausage, bologna, salami, chitterlings, fatback, hot dogs, bratwurst, and packaged lunch meats. Liver and organ meats. Whole eggs and egg yolks. Chicken and turkey with skin. Fried meat.  Dairy  · Whole or 2% milk, cream,  "half-and-half, and cream cheese. Whole milk cheeses. Whole-fat or sweetened yogurt. Full-fat cheeses. Nondairy creamers and whipped toppings. Processed cheese, cheese spreads, and cheese curds.  Beverages  · Alcohol. Sugar-sweetened drinks such as sodas, lemonade, and fruit drinks.  Fats and oils  · Butter, stick margarine, lard, shortening, ghee, or kennedy fat. Coconut, palm kernel, and palm oils.  Sweets and desserts  · Corn syrup, sugars, honey, and molasses. Candy. Jam and jelly. Syrup. Sweetened cereals. Cookies, pies, cakes, donuts, muffins, and ice cream.  The items listed above may not be a complete list of foods and beverages you should avoid. Contact a dietitian for more information.  Summary  · Choosing the right foods helps keep your fat and cholesterol at normal levels. This can keep you from getting certain diseases.  · At meals, fill one-half of your plate with vegetables and green salads.  · Eat high-fiber foods, like whole grains, beans, apples, carrots, peas, and barley.  · Limit added sugar, saturated fats, alcohol, and fried foods.  This information is not intended to replace advice given to you by your health care provider. Make sure you discuss any questions you have with your health care provider.  Document Revised: 04/21/2021 Document Reviewed: 04/21/2021  [employer] Patient Education © 2021 [employer]      https://www.diabeteseducator.org/docs/default-source/living-with-diabetes/conquering-the-grocery-store-v1.pdf?sfvrsn=4\">   Carbohydrate Counting for Diabetes Mellitus, Adult  Carbohydrate counting is a method of keeping track of how many carbohydrates you eat. Eating carbohydrates naturally increases the amount of sugar (glucose) in the blood. Counting how many carbohydrates you eat improves your blood glucose control, which helps you manage your diabetes.  It is important to know how many carbohydrates you can safely have in each meal. This is different for every person. A dietitian can " help you make a meal plan and calculate how many carbohydrates you should have at each meal and snack.  What foods contain carbohydrates?  Carbohydrates are found in the following foods:  · Grains, such as breads and cereals.  · Dried beans and soy products.  · Starchy vegetables, such as potatoes, peas, and corn.  · Fruit and fruit juices.  · Milk and yogurt.  · Sweets and snack foods, such as cake, cookies, candy, chips, and soft drinks.  How do I count carbohydrates in foods?  There are two ways to count carbohydrates in food. You can read food labels or learn standard serving sizes of foods. You can use either of the methods or a combination of both.  Using the Nutrition Facts label  The Nutrition Facts list is included on the labels of almost all packaged foods and beverages in the [place] It includes:  · The serving size.  · Information about nutrients in each serving, including the grams (g) of carbohydrate per serving.  To use the Nutrition Facts:  · Decide how many servings you will have.  · Multiply the number of servings by the number of carbohydrates per serving.  · The resulting number is the total amount of carbohydrates that you will be having.  Learning the standard serving sizes of foods  When you eat carbohydrate foods that are not packaged or do not include Nutrition Facts on the label, you need to measure the servings in order to count the amount of carbohydrates.  · Measure the foods that you will eat with a food scale or measuring cup, if needed.  · Decide how many standard-size servings you will eat.  · Multiply the number of servings by 15. For foods that contain carbohydrates, one serving equals 15 g of carbohydrates.  ? For example, if you eat 2 cups or 10 oz (300 g) of strawberries, you will have eaten 2 servings and 30 g of carbohydrates (2 servings x 15 g = 30 g).  · For foods that have more than one food mixed, such as soups and casseroles, you must count the carbohydrates in each food  that is included.  The following list contains standard serving sizes of common carbohydrate-rich foods. Each of these servings has about 15 g of carbohydrates:  · 1 slice of bread.  · 1 six-inch (15 cm) tortilla.  · ? cup or 2 oz (53 g) cooked rice or pasta.  · ½ cup or 3 oz (85 g) cooked or canned, drained and rinsed beans or lentils.  · ½ cup or 3 oz (85 g) starchy vegetable, such as peas, corn, or squash.  · ½ cup or 4 oz (120 g) hot cereal.  · ½ cup or 3 oz (85 g) boiled or mashed potatoes, or ¼ or 3 oz (85 g) of a large baked potato.  · ½ cup or 4 fl oz (118 mL) fruit juice.  · 1 cup or 8 fl oz (237 mL) milk.  · 1 small or 4 oz (106 g) apple.  · ½ or 2 oz (63 g) of a medium banana.  · 1 cup or 5 oz (150 g) strawberries.  · 3 cups or 1 oz (24 g) popped popcorn.  What is an example of carbohydrate counting?  To calculate the number of carbohydrates in this sample meal, follow the steps shown below.  Sample meal  · 3 oz (85 g) chicken breast.  · ? cup or 4 oz (106 g) brown rice.  · ½ cup or 3 oz (85 g) corn.  · 1 cup or 8 fl oz (237 mL) milk.  · 1 cup or 5 oz (150 g) strawberries with sugar-free whipped topping.  Carbohydrate calculation  1. Identify the foods that contain carbohydrates:  ? Rice.  ? Corn.  ? Milk.  ? Strawberries.  2. Calculate how many servings you have of each food:  ? 2 servings rice.  ? 1 serving corn.  ? 1 serving milk.  ? 1 serving strawberries.  3. Multiply each number of servings by 15 g:  ? 2 servings rice x 15 g = 30 g.  ? 1 serving corn x 15 g = 15 g.  ? 1 serving milk x 15 g = 15 g.  ? 1 serving strawberries x 15 g = 15 g.  4. Add together all of the amounts to find the total grams of carbohydrates eaten:  ? 30 g + 15 g + 15 g + 15 g = 75 g of carbohydrates total.  What are tips for following this plan?  Shopping  · Develop a meal plan and then make a shopping list.  · Buy fresh and frozen vegetables, fresh and frozen fruit, dairy, eggs, beans, lentils, and whole grains.  · Look at  food labels. Choose foods that have more fiber and less sugar.  · Avoid processed foods and foods with added sugars.  Meal planning  · Aim to have the same amount of carbohydrates at each meal and for each snack time.  · Plan to have regular, balanced meals and snacks.  Where to find more information  · American Diabetes Association: www.diabetes.org  · Centers for Disease Control and Prevention: www.cdc.gov  Summary  · Carbohydrate counting is a method of keeping track of how many carbohydrates you eat.  · Eating carbohydrates naturally increases the amount of sugar (glucose) in the blood.  · Counting how many carbohydrates you eat improves your blood glucose control, which helps you manage your diabetes.  · A dietitian can help you make a meal plan and calculate how many carbohydrates you should have at each meal and snack.  This information is not intended to replace advice given to you by your health care provider. Make sure you discuss any questions you have with your health care provider.  Document Revised: 12/17/2020 Document Reviewed: 12/18/2020  [employer] Patient Education © 2021 [employer]

## 2021-12-05 NOTE — PROGRESS NOTES
"Subjective   Jennifer Horne is a 61 y.o. female.       Chief Complaint -abnormal lab work    History of Present Illness -    ROS    Abnormal lab work-  Recent lab work showed abnormal hemoglobin A1c at 5.7 which is consistent with prediabetes.  This was discussed with patient today    Abnormal cholesterol-  Lab work shows LDL elevated at 113 consistent with hypercholesterolemia.    Osteopenia-  DEXA scan was consistent with osteopenia and patient states that she has not been taking her Evista recently.  She states she has continued her calcium and vitamin D supplementation    Allergic rhinitis-stable with Flonase and Xyzal    Gastroesophageal reflux disease-stable with pantoprazole    Muscle spasms-  Stable with tizanidine that she mainly uses at night    Nocturnal leg cramps-controlled with verapamil      The following portions of the patient's history were reviewed and updated as appropriate: allergies, current medications, past family history, past medical history, past social history, past surgical history and problem list.    Review of Systems    Objective  Vital signs:  /80 (BP Location: Right arm, Patient Position: Sitting, Cuff Size: Adult)   Pulse 81   Temp 97 °F (36.1 °C) (Temporal)   Ht 162.6 cm (64.02\")   Wt 105 kg (231 lb)   SpO2 98%   BMI 39.63 kg/m²     Physical Exam  Vitals and nursing note reviewed.   Constitutional:       Appearance: Normal appearance. She is well-developed. She is obese.   Eyes:      Extraocular Movements: Extraocular movements intact.      Conjunctiva/sclera: Conjunctivae normal.   Cardiovascular:      Rate and Rhythm: Normal rate and regular rhythm.      Heart sounds: Normal heart sounds. No murmur heard.      Pulmonary:      Effort: Pulmonary effort is normal. No respiratory distress.      Breath sounds: Normal breath sounds. No wheezing.   Musculoskeletal:         General: No tenderness.   Skin:     General: Skin is warm and dry.      Findings: No rash. "   Neurological:      Mental Status: She is alert and oriented to person, place, and time.   Psychiatric:         Mood and Affect: Mood normal.         Behavior: Behavior normal.         Thought Content: Thought content normal.         The following data was reviewed by: MAIK An on 12/03/2021:  CMP    CMP 11/19/21   Glucose 93   BUN 12   Creatinine 0.72   eGFR Non African Am 83   Sodium 139   Potassium 4.3   Chloride 103   Calcium 9.6   Albumin 4.50   Total Bilirubin 0.5   Alkaline Phosphatase 91   AST (SGOT) 19   ALT (SGPT) 17             Lipid Panel    Lipid Panel 11/19/21   Total Cholesterol 187   Triglycerides 118   HDL Cholesterol 53   VLDL Cholesterol 21   LDL Cholesterol  113 (A)   LDL/HDL Ratio 2.08   (A) Abnormal value            TSH    TSH 11/19/21   TSH 1.330           Most Recent A1C    HGBA1C Most Recent 11/19/21   Hemoglobin A1C 5.70 (A)   (A) Abnormal value            Data reviewed: 3/12/2021 DEXA scan reviewed       Assessment/Plan     Diagnoses and all orders for this visit:    1. Prediabetes (Primary)  Comments:  Patient diagnosed with prediabetes today and advised low carbohydrate diet    2. Non-seasonal allergic rhinitis due to pollen  Comments:  Continue Flonase  Advised to avoid allergen triggers when possible  Orders:  -     fluticasone (FLONASE) 50 MCG/ACT nasal spray; SPRAY TWO SPRAYS IN EACH NOSTRIL ONCE DAILY  Dispense: 18.2 mL; Refill: 5    3. Chronic nonseasonal allergic rhinitis due to pollen  Comments:  Continue Xyzal and Flonase  Advised to avoid known environmental allergens when possible  Orders:  -     levocetirizine (XYZAL) 5 MG tablet; Take 1 tablet by mouth Every Morning.  Dispense: 90 tablet; Refill: 3    4. GERD without esophagitis  Comments:  Continue Protonix  Orders:  -     pantoprazole (PROTONIX) 40 MG EC tablet; Take 1 tablet by mouth Daily.  Dispense: 90 tablet; Refill: 3    5. Osteopenia of multiple sites  Comments:  Advised to start Evista daily  Continue  calcium and vitamin D supplementation  Fall risk reduction advised  Orders:  -     raloxifene (EVISTA) 60 MG tablet; Take 1 tablet by mouth Daily.  Dispense: 90 tablet; Refill: 3    6. Muscle spasm of both lower legs  Comments:  Continue tizanidine  Orders:  -     tiZANidine (ZANAFLEX) 4 MG tablet; Take 1 tablet by mouth At Night As Needed for Muscle Spasms.  Dispense: 90 tablet; Refill: 3    7. Nocturnal leg cramps  Comments:  Continue verapamil (CCB)  and rx compounded cream prn  Orders:  -     verapamil SR (CALAN-SR) 120 MG CR tablet; Take 1 tablet by mouth Every Night.  Dispense: 90 tablet; Refill: 3    8. Mixed hyperlipidemia  Comments:  Advised low-cholesterol diet trial for the next 6 months            Patient was given instructions and counseling regarding his condition or for health maintenance advice. Please see specific information pulled into the AVS if appropriate      This document has been electronically signed by:  Aisha Coffman PA-C

## 2021-12-10 ENCOUNTER — APPOINTMENT (OUTPATIENT)
Dept: CT IMAGING | Facility: HOSPITAL | Age: 61
End: 2021-12-10

## 2021-12-10 ENCOUNTER — HOSPITAL ENCOUNTER (EMERGENCY)
Facility: HOSPITAL | Age: 61
Discharge: HOME | End: 2021-12-11
Attending: EMERGENCY MEDICINE | Admitting: EMERGENCY MEDICINE

## 2021-12-10 DIAGNOSIS — R55 NEAR SYNCOPE: Primary | ICD-10-CM

## 2021-12-10 PROCEDURE — 99284 EMERGENCY DEPT VISIT MOD MDM: CPT

## 2021-12-10 PROCEDURE — 70450 CT HEAD/BRAIN W/O DYE: CPT

## 2021-12-10 PROCEDURE — 93005 ELECTROCARDIOGRAM TRACING: CPT | Performed by: EMERGENCY MEDICINE

## 2021-12-11 VITALS
OXYGEN SATURATION: 95 % | BODY MASS INDEX: 36.7 KG/M2 | WEIGHT: 215 LBS | TEMPERATURE: 97.9 F | HEIGHT: 64 IN | HEART RATE: 69 BPM | SYSTOLIC BLOOD PRESSURE: 126 MMHG | RESPIRATION RATE: 17 BRPM | DIASTOLIC BLOOD PRESSURE: 70 MMHG

## 2021-12-11 LAB
ALBUMIN SERPL-MCNC: 3.99 G/DL (ref 3.5–5.2)
ALBUMIN/GLOB SERPL: 1.5 G/DL
ALP SERPL-CCNC: 92 U/L (ref 39–117)
ALT SERPL W P-5'-P-CCNC: 15 U/L (ref 1–33)
ANION GAP SERPL CALCULATED.3IONS-SCNC: 8.5 MMOL/L (ref 5–15)
AST SERPL-CCNC: 21 U/L (ref 1–32)
BACTERIA UR QL AUTO: ABNORMAL /HPF
BASOPHILS # BLD AUTO: 0.03 10*3/MM3 (ref 0–0.2)
BASOPHILS NFR BLD AUTO: 0.4 % (ref 0–1.5)
BILIRUB SERPL-MCNC: 0.4 MG/DL (ref 0–1.2)
BILIRUB UR QL STRIP: NEGATIVE
BUN SERPL-MCNC: 18 MG/DL (ref 6–20)
BUN/CREAT SERPL: 22.8 (ref 7–25)
CALCIUM SPEC-SCNC: 9.1 MG/DL (ref 8.6–10.5)
CHLORIDE SERPL-SCNC: 104 MMOL/L (ref 98–107)
CLARITY UR: ABNORMAL
CO2 SERPL-SCNC: 25.5 MMOL/L (ref 22–29)
COLOR UR: YELLOW
CREAT SERPL-MCNC: 0.79 MG/DL (ref 0.57–1)
DEPRECATED RDW RBC AUTO: 43 FL (ref 37–54)
EOSINOPHIL # BLD AUTO: 0.09 10*3/MM3 (ref 0–0.4)
EOSINOPHIL NFR BLD AUTO: 1.1 % (ref 0.3–6.2)
ERYTHROCYTE [DISTWIDTH] IN BLOOD BY AUTOMATED COUNT: 13.4 % (ref 12.3–15.4)
FLUAV RNA RESP QL NAA+PROBE: NOT DETECTED
FLUBV RNA RESP QL NAA+PROBE: NOT DETECTED
GFR SERPL CREATININE-BSD FRML MDRD: 74 ML/MIN/1.73
GLOBULIN UR ELPH-MCNC: 2.6 GM/DL
GLUCOSE SERPL-MCNC: 132 MG/DL (ref 65–99)
GLUCOSE UR STRIP-MCNC: NEGATIVE MG/DL
HCT VFR BLD AUTO: 38.2 % (ref 34–46.6)
HGB BLD-MCNC: 12.1 G/DL (ref 12–15.9)
HGB UR QL STRIP.AUTO: NEGATIVE
HYALINE CASTS UR QL AUTO: ABNORMAL /LPF
IMM GRANULOCYTES # BLD AUTO: 0.03 10*3/MM3 (ref 0–0.05)
IMM GRANULOCYTES NFR BLD AUTO: 0.4 % (ref 0–0.5)
KETONES UR QL STRIP: ABNORMAL
LEUKOCYTE ESTERASE UR QL STRIP.AUTO: ABNORMAL
LYMPHOCYTES # BLD AUTO: 1.23 10*3/MM3 (ref 0.7–3.1)
LYMPHOCYTES NFR BLD AUTO: 14.6 % (ref 19.6–45.3)
MAGNESIUM SERPL-MCNC: 2.1 MG/DL (ref 1.6–2.6)
MCH RBC QN AUTO: 27.7 PG (ref 26.6–33)
MCHC RBC AUTO-ENTMCNC: 31.7 G/DL (ref 31.5–35.7)
MCV RBC AUTO: 87.4 FL (ref 79–97)
MONOCYTES # BLD AUTO: 0.59 10*3/MM3 (ref 0.1–0.9)
MONOCYTES NFR BLD AUTO: 7 % (ref 5–12)
NEUTROPHILS NFR BLD AUTO: 6.47 10*3/MM3 (ref 1.7–7)
NEUTROPHILS NFR BLD AUTO: 76.5 % (ref 42.7–76)
NITRITE UR QL STRIP: NEGATIVE
NRBC BLD AUTO-RTO: 0 /100 WBC (ref 0–0.2)
NT-PROBNP SERPL-MCNC: 35.8 PG/ML (ref 0–900)
PH UR STRIP.AUTO: 5.5 [PH] (ref 5–8)
PLATELET # BLD AUTO: 235 10*3/MM3 (ref 140–450)
PMV BLD AUTO: 9.9 FL (ref 6–12)
POTASSIUM SERPL-SCNC: 3.8 MMOL/L (ref 3.5–5.2)
PROT SERPL-MCNC: 6.6 G/DL (ref 6–8.5)
PROT UR QL STRIP: NEGATIVE
QT INTERVAL: 396 MS
QTC INTERVAL: 439 MS
RBC # BLD AUTO: 4.37 10*6/MM3 (ref 3.77–5.28)
RBC # UR STRIP: ABNORMAL /HPF
REF LAB TEST METHOD: ABNORMAL
SARS-COV-2 RNA RESP QL NAA+PROBE: NOT DETECTED
SODIUM SERPL-SCNC: 138 MMOL/L (ref 136–145)
SP GR UR STRIP: 1.01 (ref 1–1.03)
SQUAMOUS #/AREA URNS HPF: ABNORMAL /HPF
TROPONIN T SERPL-MCNC: <0.01 NG/ML (ref 0–0.03)
UROBILINOGEN UR QL STRIP: ABNORMAL
WBC # UR STRIP: ABNORMAL /HPF
WBC NRBC COR # BLD: 8.44 10*3/MM3 (ref 3.4–10.8)

## 2021-12-11 PROCEDURE — 83735 ASSAY OF MAGNESIUM: CPT | Performed by: EMERGENCY MEDICINE

## 2021-12-11 PROCEDURE — 80053 COMPREHEN METABOLIC PANEL: CPT | Performed by: EMERGENCY MEDICINE

## 2021-12-11 PROCEDURE — 87086 URINE CULTURE/COLONY COUNT: CPT | Performed by: EMERGENCY MEDICINE

## 2021-12-11 PROCEDURE — 81001 URINALYSIS AUTO W/SCOPE: CPT | Performed by: EMERGENCY MEDICINE

## 2021-12-11 PROCEDURE — 25010000002 ONDANSETRON PER 1 MG: Performed by: EMERGENCY MEDICINE

## 2021-12-11 PROCEDURE — 87636 SARSCOV2 & INF A&B AMP PRB: CPT | Performed by: EMERGENCY MEDICINE

## 2021-12-11 PROCEDURE — 84484 ASSAY OF TROPONIN QUANT: CPT | Performed by: EMERGENCY MEDICINE

## 2021-12-11 PROCEDURE — 96374 THER/PROPH/DIAG INJ IV PUSH: CPT

## 2021-12-11 PROCEDURE — 85025 COMPLETE CBC W/AUTO DIFF WBC: CPT | Performed by: EMERGENCY MEDICINE

## 2021-12-11 PROCEDURE — 83880 ASSAY OF NATRIURETIC PEPTIDE: CPT | Performed by: EMERGENCY MEDICINE

## 2021-12-11 RX ORDER — ONDANSETRON 2 MG/ML
4 INJECTION INTRAMUSCULAR; INTRAVENOUS ONCE
Status: COMPLETED | OUTPATIENT
Start: 2021-12-11 | End: 2021-12-11

## 2021-12-11 RX ADMIN — SODIUM CHLORIDE 1000 ML: 9 INJECTION, SOLUTION INTRAVENOUS at 00:34

## 2021-12-11 RX ADMIN — ONDANSETRON 4 MG: 2 INJECTION INTRAMUSCULAR; INTRAVENOUS at 01:00

## 2021-12-11 RX ADMIN — SODIUM CHLORIDE 500 ML: 9 INJECTION, SOLUTION INTRAVENOUS at 01:30

## 2021-12-11 NOTE — ED PROVIDER NOTES
Subjective   Patient had near syncope times two, started new medication      Syncope  Episode history: two.  Most recent episode:  Today  Chronicity:  New  Context: medication change    Witnessed: yes    Relieved by:  Lying down  Worsened by:  Posture  Ineffective treatments:  None tried  Associated symptoms: dizziness and weakness        Review of Systems   Constitutional: Positive for activity change and fatigue.   HENT: Negative.    Eyes: Negative.    Respiratory: Negative.    Cardiovascular: Positive for syncope.   Gastrointestinal: Negative.    Endocrine: Positive for cold intolerance.   Musculoskeletal: Negative.    Skin: Negative.    Neurological: Positive for dizziness, weakness and light-headedness.       Past Medical History:   Diagnosis Date   • Allergic    • Arthritis    • GERD (gastroesophageal reflux disease)    • GERD (gastroesophageal reflux disease)    • PONV (postoperative nausea and vomiting)        Allergies   Allergen Reactions   • Compazine [Prochlorperazine] Myalgia     Muscle spasms specifically that resembled seizure activity   • Morphine Nausea And Vomiting and Other (See Comments)     Abdominal pains       Past Surgical History:   Procedure Laterality Date   • BREAST BIOPSY Right     benign   • CARDIAC CATHETERIZATION N/A 2019    Procedure: Left Heart Cath;  Surgeon: Torsten Sandoval III, MD;  Location: [facility] INVASIVE LOCATION;  Service: Cardiovascular   •  SECTION     • CHOLECYSTECTOMY     • COLONOSCOPY     • GASTRIC BYPASS     • HYSTERECTOMY         Family History   Problem Relation Age of Onset   • Cancer Mother    • Hypertension Father    • No Known Problems Brother    • Breast cancer Neg Hx        Social History     Socioeconomic History   • Marital status:      Spouse name: Alexandria   • Number of children: 1   • Years of education: 18   Tobacco Use   • Smoking status: Former Smoker     Packs/day: 1.00     Years: 20.00     Pack years: 20.00      Types: Cigarettes     Quit date: 2007     Years since quittin.2   • Smokeless tobacco: Never Used   Vaping Use   • Vaping Use: Never used   Substance and Sexual Activity   • Alcohol use: No   • Drug use: No   • Sexual activity: Defer           Objective   Physical Exam  Vitals and nursing note reviewed.   Constitutional:       Appearance: She is well-developed.   HENT:      Head: Normocephalic.   Eyes:      Pupils: Pupils are equal, round, and reactive to light.   Cardiovascular:      Rate and Rhythm: Normal rate.      Heart sounds: Normal heart sounds.   Pulmonary:      Effort: Pulmonary effort is normal.      Breath sounds: Normal breath sounds.   Abdominal:      Palpations: Abdomen is soft.   Musculoskeletal:         General: Normal range of motion.      Cervical back: Normal range of motion.   Skin:     General: Skin is warm.      Capillary Refill: Capillary refill takes less than 2 seconds.   Neurological:      General: No focal deficit present.      Mental Status: She is alert.   Psychiatric:         Mood and Affect: Mood normal.         Procedures           ED Course  ED Course as of 21   Sat Dec 11, 2021   0001 ECG 23:40 NSR, rate 74. Normal ECG. QT/QTc 396/439 [LYN]   0124 CT head : negative [LYN]      ED Course User Index  [LYN] Rajiv Smith MD                                                 [facility]    Final diagnoses:   Near syncope       ED Disposition  ED Disposition     ED Disposition Condition Comment    Discharge Stable           Aisha Coffman PA  [address] [facility] [address]  [phone]    Schedule an appointment as soon as possible for a visit   If symptoms worsen         Medication List      No changes were made to your prescriptions during this visit.          Rajiv Smith MD  21       Rajiv Smith MD  21

## 2021-12-12 LAB — BACTERIA SPEC AEROBE CULT: NORMAL

## 2021-12-13 ENCOUNTER — OFFICE VISIT (OUTPATIENT)
Dept: FAMILY MEDICINE CLINIC | Facility: CLINIC | Age: 61
End: 2021-12-13

## 2021-12-13 VITALS
SYSTOLIC BLOOD PRESSURE: 142 MMHG | TEMPERATURE: 96.9 F | BODY MASS INDEX: 38.58 KG/M2 | WEIGHT: 226 LBS | DIASTOLIC BLOOD PRESSURE: 82 MMHG | HEART RATE: 74 BPM | OXYGEN SATURATION: 99 % | HEIGHT: 64 IN

## 2021-12-13 DIAGNOSIS — M81.0 SENILE OSTEOPOROSIS: Chronic | ICD-10-CM

## 2021-12-13 DIAGNOSIS — R07.89 OTHER CHEST PAIN: ICD-10-CM

## 2021-12-13 DIAGNOSIS — G25.81 RESTLESS LEGS: Chronic | ICD-10-CM

## 2021-12-13 DIAGNOSIS — Z88.9 ALLERGY TO DRUG: Primary | ICD-10-CM

## 2021-12-13 LAB
BILIRUB BLD-MCNC: NEGATIVE MG/DL
CLARITY, POC: ABNORMAL
COLOR UR: YELLOW
EXPIRATION DATE: ABNORMAL
GLUCOSE UR STRIP-MCNC: ABNORMAL MG/DL
KETONES UR QL: NEGATIVE
LEUKOCYTE EST, POC: NEGATIVE
Lab: ABNORMAL
NITRITE UR-MCNC: NEGATIVE MG/ML
PH UR: 6.5 [PH] (ref 5–8)
PROT UR STRIP-MCNC: NEGATIVE MG/DL
RBC # UR STRIP: NEGATIVE /UL
SP GR UR: 1.02 (ref 1–1.03)
UROBILINOGEN UR QL: NORMAL

## 2021-12-13 PROCEDURE — 81003 URINALYSIS AUTO W/O SCOPE: CPT | Performed by: PHYSICIAN ASSISTANT

## 2021-12-13 PROCEDURE — 87086 URINE CULTURE/COLONY COUNT: CPT | Performed by: PHYSICIAN ASSISTANT

## 2021-12-13 PROCEDURE — 99214 OFFICE O/P EST MOD 30 MIN: CPT | Performed by: PHYSICIAN ASSISTANT

## 2021-12-13 RX ORDER — FAMOTIDINE 20 MG/1
20 TABLET, FILM COATED ORAL 2 TIMES DAILY
Qty: 60 TABLET | Refills: 0 | Status: SHIPPED | OUTPATIENT
Start: 2021-12-13 | End: 2022-06-03

## 2021-12-13 NOTE — PROGRESS NOTES
"Subjective   Jennifer Horne is a 61 y.o. female.       Chief Complaint -ER follow-up/syncope    History of Present Illness -    ROS    ER follow-up/syncope-  She is here today with her  Prabhjot who is helping with the history.  She was seen in office on 12/3/2021 and we discussed her osteoporosis.  She was started on Evista.  She actually started taking the Evista pill on December 4.  She began to experience vague symptoms including nausea, decreased appetite, and intermittent chest pain at rest.  She states she has had for several days just not felt.  Saturday she began to have leg cramps and said she \"felt bad\" so she went to the shower to run hot water on her legs to help the cramps.  She began to feel faint like she was going to pass out.  Her  came into the room and he states that she passed out and quit breathing while in the shower.  Family members were home who helped get her out of the shower chair and lay her supine.  Prabhjot states he did sternal rub and was shaking her without any response.  He states her coloration was bluish and pale.  He was about to do CPR when she came around and began breathing on her own.  911 was called and she was taken to [facility] ER during this episode.    12/10/2021 she went to [facility] ER where she was evaluated and cardiac causes ruled out.  She underwent CT of the head that was unremarkable.  She advised that symptoms may be secondary to allergy to the new medication Avista.  She discontinued Evista due to this episode and held verapamil secondary to hypotension.    She complains of generalized symptoms including fatigue, decreased concentration and numbness in her mouth and tongue for the past few days.    Osteoporosis-  Not at goal as patient has been unable to tolerate Evista.    Restless leg syndrome-not at goal as patient does have worsening of leg cramps with taking Evista and holding verapamil.    The following portions of the " "patient's history were reviewed and updated as appropriate: allergies, current medications, past family history, past medical history, past social history, past surgical history and problem list.    Review of Systems    Objective  Vital signs:  /82   Pulse 74   Temp 96.9 °F (36.1 °C) (Temporal)   Ht 162.6 cm (64\")   Wt 103 kg (226 lb)   SpO2 99%   BMI 38.79 kg/m²     Physical Exam  Vitals and nursing note reviewed.   Constitutional:       Appearance: Normal appearance. She is well-developed. She is obese.      Comments: Patient appears lethargic   HENT:      Head: Normocephalic and atraumatic.      Right Ear: Tympanic membrane normal.      Left Ear: Tympanic membrane normal.      Nose: Nose normal.      Mouth/Throat:      Mouth: Mucous membranes are moist.      Pharynx: No oropharyngeal exudate or posterior oropharyngeal erythema.   Eyes:      Extraocular Movements: Extraocular movements intact.      Conjunctiva/sclera: Conjunctivae normal.   Neck:      Thyroid: No thyromegaly.      Trachea: No tracheal deviation.   Cardiovascular:      Rate and Rhythm: Normal rate and regular rhythm.      Heart sounds: Normal heart sounds. No murmur heard.      Pulmonary:      Effort: Pulmonary effort is normal. No respiratory distress.      Breath sounds: Normal breath sounds. No wheezing.   Abdominal:      General: Bowel sounds are normal.      Palpations: Abdomen is soft.      Tenderness: There is no abdominal tenderness. There is no guarding.   Musculoskeletal:         General: No tenderness. Normal range of motion.      Cervical back: Normal range of motion and neck supple.   Lymphadenopathy:      Cervical: No cervical adenopathy.   Skin:     General: Skin is warm and dry.      Findings: No rash.   Neurological:      General: No focal deficit present.      Mental Status: She is oriented to person, place, and time.   Psychiatric:         Mood and Affect: Mood normal.         Behavior: Behavior normal.         " Thought Content: Thought content normal.         The following data was reviewed by: MAIK An on 12/13/2021:  CMP    CMP 11/19/21 12/11/21   Glucose 93 132 (A)   BUN 12 18   Creatinine 0.72 0.79   eGFR Non African Am 83 74   Sodium 139 138   Potassium 4.3 3.8   Chloride 103 104   Calcium 9.6 9.1   Albumin 4.50 3.99   Total Bilirubin 0.5 0.4   Alkaline Phosphatase 91 92   AST (SGOT) 19 21   ALT (SGPT) 17 15   (A) Abnormal value       Comments are available for some flowsheets but are not being displayed.           CBC w/diff    CBC w/Diff 12/11/21   WBC 8.44   RBC 4.37   Hemoglobin 12.1   Hematocrit 38.2   MCV 87.4   MCH 27.7   MCHC 31.7   RDW 13.4   Platelets 235   Neutrophil Rel % 76.5 (A)   Immature Granulocyte Rel % 0.4   Lymphocyte Rel % 14.6 (A)   Monocyte Rel % 7.0   Eosinophil Rel % 1.1   Basophil Rel % 0.4   (A) Abnormal value            Lipid Panel    Lipid Panel 11/19/21   Total Cholesterol 187   Triglycerides 118   HDL Cholesterol 53   VLDL Cholesterol 21   LDL Cholesterol  113 (A)   LDL/HDL Ratio 2.08   (A) Abnormal value            TSH    TSH 11/19/21   TSH 1.330           Most Recent A1C    HGBA1C Most Recent 11/19/21   Hemoglobin A1C 5.70 (A)   (A) Abnormal value            UA    Urinalysis 12/11/21 12/11/21 12/13/21    0217 0217    Squamous Epithelial Cells, UA 0-2     Specific Gravity, UA  1.013    Ketones, UA  Trace (A) Negative   Blood, UA  Negative    Leukocytes, UA  Moderate (2+) (A) Negative   Nitrite, UA  Negative    RBC, UA 0-2     WBC, UA 6-12 (A)     Bacteria, UA None Seen     (A) Abnormal value            Data reviewed: CT scans and ER visit notes as documented above       Assessment/Plan     Diagnoses and all orders for this visit:    1. Allergy to drug (Primary)  Comments:  Discontinue Evista and add to allergy list  Advised patient never to take this drug again  Start Pepcid for the next week with Xyzal  Orders:  -     famotidine (Pepcid) 20 MG tablet; Take 1 tablet by  mouth 2 (Two) Times a Day.  Dispense: 60 tablet; Refill: 0  -     POCT urinalysis dipstick, automated  -     Urine Culture - Urine, Urine, Clean Catch    2. Other chest pain  Comments:  Refer to cardiology as she would benefit from cardiac work-up including echocardiogram given syncope and chest pain with recent events  Orders:  -     Ambulatory Referral to Cardiology  -     POCT urinalysis dipstick, automated  -     Urine Culture - Urine, Urine, Clean Catch    3. Senile osteoporosis  Comments:  Advise calcium and vitamin D supplementation  Discontinue Evista    4. Restless legs  Comments:  Advised to hold verapamil until blood pressure stabilizes and syncopal symptoms resolve    Urinalysis was repeated due to abnormal UA in ER and was unremarkable today.  I will follow-up with her in 3 days time or sooner if needed.  Patient's  advised to do blood pressure and pulse log and report any abnormal symptoms.        Patient was given instructions and counseling regarding his condition or for health maintenance advice. Please see specific information pulled into the AVS if appropriate      This document has been electronically signed by:  Aisha Coffman PA-C

## 2021-12-13 NOTE — PATIENT INSTRUCTIONS
Fall Prevention in the Home, Adult  Falls can cause injuries. They can happen to people of all ages. There are many things you can do to make your home safe and to help prevent falls. Ask for help when making these changes, if needed.  What actions can I take to prevent falls?  General Instructions  · Use good lighting in all rooms. Replace any light bulbs that burn out.  · Turn on the lights when you go into a dark area. Use night-lights.  · Keep items that you use often in easy-to-reach places. Lower the shelves around your home if necessary.  · Set up your furniture so you have a clear path. Avoid moving your furniture around.  · Do not have throw rugs and other things on the floor that can make you trip.  · Avoid walking on wet floors.  · If any of your floors are uneven, fix them.  · Add color or contrast paint or tape to clearly nhan and help you see:  ? Any grab bars or handrails.  ? First and last steps of stairways.  ? Where the edge of each step is.  · If you use a stepladder:  ? Make sure that it is fully opened. Do not climb a closed stepladder.  ? Make sure that both sides of the stepladder are locked into place.  ? Ask someone to hold the stepladder for you while you use it.  · If there are any pets around you, be aware of where they are.  What can I do in the bathroom?         · Keep the floor dry. Clean up any water that spills onto the floor as soon as it happens.  · Remove soap buildup in the tub or shower regularly.  · Use non-skid mats or decals on the floor of the tub or shower.  · Attach bath mats securely with double-sided, non-slip rug tape.  · If you need to sit down in the shower, use a plastic, non-slip stool.  · Install grab bars by the toilet and in the tub and shower. Do not use towel bars as grab bars.  What can I do in the bedroom?  · Make sure that you have a light by your bed that is easy to reach.  · Do not use any sheets or blankets that are too big for your bed. They should not  hang down onto the floor.  · Have a firm chair that has side arms. You can use this for support while you get dressed.  What can I do in the kitchen?  · Clean up any spills right away.  · If you need to reach something above you, use a strong step stool that has a grab bar.  · Keep electrical cords out of the way.  · Do not use floor polish or wax that makes floors slippery. If you must use wax, use non-skid floor wax.  What can I do with my stairs?  · Do not leave any items on the stairs.  · Make sure that you have a light switch at the top of the stairs and the bottom of the stairs. If you do not have them, ask someone to add them for you.  · Make sure that there are handrails on both sides of the stairs, and use them. Fix handrails that are broken or loose. Make sure that handrails are as long as the stairways.  · Install non-slip stair treads on all stairs in your home.  · Avoid having throw rugs at the top or bottom of the stairs. If you do have throw rugs, attach them to the floor with carpet tape.  · Choose a carpet that does not hide the edge of the steps on the stairway.  · Check any carpeting to make sure that it is firmly attached to the stairs. Fix any carpet that is loose or worn.  What can I do on the outside of my home?  · Use bright outdoor lighting.  · Regularly fix the edges of walkways and driveways and fix any cracks.  · Remove anything that might make you trip as you walk through a door, such as a raised step or threshold.  · Trim any bushes or trees on the path to your home.  · Regularly check to see if handrails are loose or broken. Make sure that both sides of any steps have handrails.  · Install guardrails along the edges of any raised decks and porches.  · Clear walking paths of anything that might make someone trip, such as tools or rocks.  · Have any leaves, snow, or ice cleared regularly.  · Use sand or salt on walking paths during winter.  · Clean up any spills in your garage right  away. This includes grease or oil spills.  What other actions can I take?  · Wear shoes that:  ? Have a low heel. Do not wear high heels.  ? Have rubber bottoms.  ? Are comfortable and fit you well.  ? Are closed at the toe. Do not wear open-toe sandals.  · Use tools that help you move around (mobility aids) if they are needed. These include:  ? Canes.  ? Walkers.  ? Scooters.  ? Crutches.  · Review your medicines with your doctor. Some medicines can make you feel dizzy. This can increase your chance of falling.  Ask your doctor what other things you can do to help prevent falls.  Where to find more information  · Centers for Disease Control and Prevention, [employer]: https://cdc.gov  · [employer]: https://xc2dbej.tawanna.nih.gov  Contact a doctor if:  · You are afraid of falling at home.  · You feel weak, drowsy, or dizzy at home.  · You fall at home.  Summary  · There are many simple things that you can do to make your home safe and to help prevent falls.  · Ways to make your home safe include removing tripping hazards and installing grab bars in the bathroom.  · Ask for help when making these changes in your home.  This information is not intended to replace advice given to you by your health care provider. Make sure you discuss any questions you have with your health care provider.  Document Revised: 04/09/2020 Document Reviewed: 08/02/2018  [employer] Patient Education © 2021 [employer]      Syncope  Syncope is when you pass out (faint) for a short time. It is caused by a sudden decrease in blood flow to the brain. Signs that you may be about to pass out include:  · Feeling dizzy or light-headed.  · Feeling sick to your stomach (nauseous).  · Seeing all white or all black.  · Having cold, clammy skin.  If you pass out, get help right away. Call your local emergency services (911 in the [place] Do not drive yourself to the hospital.  Follow these instructions at home:  Watch for any changes in your  symptoms. Take these actions to stay safe and help with your symptoms:  Lifestyle  · Do not drive, use machinery, or play sports until your doctor says it is okay.  · Do not drink alcohol.  · Do not use any products that contain nicotine or tobacco, such as cigarettes and e-cigarettes. If you need help quitting, ask your doctor.  · Drink enough fluid to keep your pee (urine) pale yellow.  General instructions  · Take over-the-counter and prescription medicines only as told by your doctor.  · If you are taking blood pressure or heart medicine, sit up and stand up slowly. Spend a few minutes getting ready to sit and then stand. This can help you feel less dizzy.  · Have someone stay with you until you feel stable.  · If you start to feel like you might pass out, lie down right away and raise (elevate) your feet above the level of your heart. Breathe deeply and steadily. Wait until all of the symptoms are gone.  · Keep all follow-up visits as told by your doctor. This is important.  Get help right away if:  · You have a very bad headache.  · You pass out once or more than once.  · You have pain in your chest, belly, or back.  · You have a very fast or uneven heartbeat (palpitations).  · It hurts to breathe.  · You are bleeding from your mouth or your bottom (rectum).  · You have black or tarry poop (stool).  · You have jerky movements that you cannot control (seizure).  · You are confused.  · You have trouble walking.  · You are very weak.  · You have vision problems.  These symptoms may be an emergency. Do not wait to see if the symptoms will go away. Get medical help right away. Call your local emergency services (911 in the [place] Do not drive yourself to the hospital.  Summary  · Syncope is when you pass out (faint) for a short time. It is caused by a sudden decrease in blood flow to the brain.  · Signs that you may be about to faint include feeling dizzy, light-headed, or sick to your stomach, seeing all white or  all black, or having cold, clammy skin.  · If you start to feel like you might pass out, lie down right away and raise (elevate) your feet above the level of your heart. Breathe deeply and steadily. Wait until all of the symptoms are gone.  This information is not intended to replace advice given to you by your health care provider. Make sure you discuss any questions you have with your health care provider.  Document Revised: 01/27/2021 Document Reviewed: 01/30/2019  [employer] Patient Education © 2021 [employer]

## 2021-12-14 LAB
BACTERIA UR CULT: NO GROWTH
BACTERIA UR CULT: NORMAL

## 2021-12-16 ENCOUNTER — OFFICE VISIT (OUTPATIENT)
Dept: FAMILY MEDICINE CLINIC | Facility: CLINIC | Age: 61
End: 2021-12-16

## 2021-12-16 VITALS
HEIGHT: 64 IN | BODY MASS INDEX: 38.58 KG/M2 | WEIGHT: 226 LBS | SYSTOLIC BLOOD PRESSURE: 120 MMHG | DIASTOLIC BLOOD PRESSURE: 64 MMHG

## 2021-12-16 DIAGNOSIS — Z88.9 ALLERGY TO DRUG: Primary | ICD-10-CM

## 2021-12-16 PROCEDURE — 99213 OFFICE O/P EST LOW 20 MIN: CPT | Performed by: PHYSICIAN ASSISTANT

## 2021-12-16 NOTE — PROGRESS NOTES
"Subjective   Jennifer Horne is a 61 y.o. female.     Video Visit    You have chosen to receive care through a telehealth visit.  Do you consent to use a video/audio connection for your medical care today? Yes    Chief Complaint -fatigue    History of Present Illness -    ROS    Fatigue-  She is following up on allergic reaction to Evista.  She states that her symptoms have significantly improved.  She is no longer so exhausted that she has to lay down or sleep most of the day.  She states that she has been able to sit up for a few hours.  She reports that she still has some cognitive impairment such as word retrieval and slow thought.  Cognitive function is also improving with time.  She discontinued Evista and symptoms have slowly improved since that time.    The following portions of the patient's history were reviewed and updated as appropriate: allergies, current medications, past family history, past medical history, past social history, past surgical history and problem list.    Review of Systems    Objective  Vital signs:  /64   Ht 162.6 cm (64\")   Wt 103 kg (226 lb)   BMI 38.79 kg/m²     Physical Exam  Vitals and nursing note reviewed.   Constitutional:       Appearance: Normal appearance. She is obese.   HENT:      Head: Normocephalic and atraumatic.   Eyes:      Extraocular Movements: Extraocular movements intact.      Conjunctiva/sclera: Conjunctivae normal.   Pulmonary:      Effort: Pulmonary effort is normal.      Breath sounds: Normal breath sounds. No wheezing.      Comments: Breath sounds appear normal without use of accessory muscles or wheezing appreciated today  Musculoskeletal:      Cervical back: Normal range of motion and neck supple.   Skin:     General: Skin is dry.      Findings: No erythema or rash.   Neurological:      General: No focal deficit present.      Mental Status: She is alert and oriented to person, place, and time.   Psychiatric:         Mood and Affect: Mood normal. "         Thought Content: Thought content normal.         The following data was reviewed by: MAIK An on 12/16/2021:  CMP    CMP 11/19/21 12/11/21   Glucose 93 132 (A)   BUN 12 18   Creatinine 0.72 0.79   eGFR Non African Am 83 74   Sodium 139 138   Potassium 4.3 3.8   Chloride 103 104   Calcium 9.6 9.1   Albumin 4.50 3.99   Total Bilirubin 0.5 0.4   Alkaline Phosphatase 91 92   AST (SGOT) 19 21   ALT (SGPT) 17 15   (A) Abnormal value       Comments are available for some flowsheets but are not being displayed.           CBC w/diff    CBC w/Diff 12/11/21   WBC 8.44   RBC 4.37   Hemoglobin 12.1   Hematocrit 38.2   MCV 87.4   MCH 27.7   MCHC 31.7   RDW 13.4   Platelets 235   Neutrophil Rel % 76.5 (A)   Immature Granulocyte Rel % 0.4   Lymphocyte Rel % 14.6 (A)   Monocyte Rel % 7.0   Eosinophil Rel % 1.1   Basophil Rel % 0.4   (A) Abnormal value            TSH    TSH 11/19/21   TSH 1.330           Data reviewed: Radiologic studies [facility] ER and CT head reviewed       Assessment/Plan     Diagnoses and all orders for this visit:    1. Allergy to drug (Primary)  Comments:  Continue Xyzal and Pepcid  Added Evista to allergy list and advised never to take Evista again  No driving or working until Monday assuming continued improvemen            Patient was given instructions and counseling regarding his condition or for health maintenance advice. Please see specific information pulled into the AVS if appropriate      This document has been electronically signed by:  Aisha Coffman PA-C

## 2022-01-11 ENCOUNTER — OFFICE VISIT (OUTPATIENT)
Dept: CARDIOLOGY | Facility: CLINIC | Age: 62
End: 2022-01-11

## 2022-01-11 VITALS
WEIGHT: 225.2 LBS | HEART RATE: 97 BPM | DIASTOLIC BLOOD PRESSURE: 60 MMHG | HEIGHT: 64 IN | SYSTOLIC BLOOD PRESSURE: 122 MMHG | OXYGEN SATURATION: 97 % | BODY MASS INDEX: 38.45 KG/M2

## 2022-01-11 DIAGNOSIS — R55 SYNCOPE AND COLLAPSE: Primary | ICD-10-CM

## 2022-01-11 PROCEDURE — 99213 OFFICE O/P EST LOW 20 MIN: CPT | Performed by: INTERNAL MEDICINE

## 2022-01-11 NOTE — PROGRESS NOTES
[facility]  Office visit  Jennifer Horne  1960  [phone]  There is no work phone number on file.    VISIT DATE:  1/11/2022    PCP: Aisha Coffman PA  [address] [facility] [address]    CC:  Chief Complaint   Patient presents with   • Chest Pain   • Shortness of Breath   • Dizziness   • Syncope       Previous cardiac studies and procedures:  September 2019 cardiac catheterization  · Angiographically normal coronary arteries  · Normal LV systolic function  · No aortic stenosis  · No mitral regurgitation  · Elevated LVEDP      ASSESSMENT:   Diagnosis Plan   1. Syncope and collapse  Adult Transthoracic Echo Complete W/ Cont if Necessary Per Protocol    Tilt Table    Cardiac Event Monitor   Suspect orthostatic hypotension    PLAN:  Transthoracic echo pending to assess underlying myocardial structure and function  Tilt table test  30-day ambulatory ECG monitor  Avoid dehydration and liberalize fluid and salt intake.  Continue to keep home blood pressure log.    Subjective  Interval assessment: Episode of syncope back in December.  She had been feeling poorly the day before.  Got up in the [place] to go the bathroom and experienced 2 episodes of syncope.   reports that she was not breathing at the time although this cannot be confirmed.  She does not remember the episode very well.  Evaluation in the emergency department was unremarkable.  She has had intermittent episodes of lightheadedness in the standing position which resolved in the seated position or propping her feet up.  Blood pressures tend to run less than 120/80 mmHg.  She had been on diltiazem for muscle cramps which was discontinued about a month ago.    Initial evaluation: 59-year-old female who has had recurrent episodes of precordial chest discomfort.  Describes a dull smothering sensation in her chest which often radiates to her back between her scapula.  Intermittently triggered by  "exertion, relieved with rest.  Also intermittently improved with sublingual nitroglycerin.  Onset over the previous 6 to 8 weeks.  Some mild fatigue and shortness of breath, at least one episode of moderate severe chest discomfort with associated diaphoresis and nausea.  She underwent recent echo which revealed normal LV systolic function with no underlying valvular heart disease.  She underwent an exercise myocardial perfusion imaging which revealed preserved functional capacity but did have dynamic ST repolarization abnormalities in addition to an anterior reversible perfusion defect.  She has had previous issues with dyspepsia and gastroesophageal reflux disease however these episodes have not consistently been triggered with certain types of food.    PHYSICAL EXAMINATION:  Vitals:    01/11/22 1039   BP: 122/60   BP Location: Right arm   Patient Position: Sitting   Pulse: 97   SpO2: 97%   Weight: 102 kg (225 lb 3.2 oz)   Height: 162.6 cm (64\")     General Appearance:    Alert, cooperative, no distress, appears stated age   Head:    Normocephalic, without obvious abnormality, atraumatic   Eyes:    conjunctiva/corneas clear   Nose:   Nares normal, septum midline, mucosa normal, no drainage   Throat:   Lips, teeth and gums normal   Neck:   Supple, symmetrical, trachea midline, no carotid    bruit or JVD   Lungs:     Clear to auscultation bilaterally, respirations unlabored   Chest Wall:    No tenderness or deformity    Heart:    Regular rate and rhythm, S1 and S2 normal, no murmur, rub   or gallop, normal carotid impulse bilaterally without bruit.   Abdomen:     Soft, non-tender   Extremities:   Extremities normal, atraumatic, no cyanosis or edema   Pulses:   2+ and symmetric all extremities   Skin:   Skin color, texture, turgor normal, no rashes or lesions       Diagnostic Data:  Procedures  Lab Results   Component Value Date    CHLPL 181 07/06/2016    TRIG 118 11/19/2021    HDL 53 11/19/2021     Lab Results "   Component Value Date    GLUCOSE 132 (H) 12/11/2021    BUN 18 12/11/2021    CREATININE 0.79 12/11/2021     12/11/2021    K 3.8 12/11/2021     12/11/2021    CO2 25.5 12/11/2021     Lab Results   Component Value Date    HGBA1C 5.70 (H) 11/19/2021     Lab Results   Component Value Date    WBC 8.44 12/11/2021    HGB 12.1 12/11/2021    HCT 38.2 12/11/2021     12/11/2021       Allergies  Allergies   Allergen Reactions   • Evista [Raloxifene] Shortness Of Breath     Syncope and chest pain   • Compazine [Prochlorperazine] Myalgia     Muscle spasms specifically that resembled seizure activity   • Morphine Nausea And Vomiting and Other (See Comments)     Abdominal pains       Current Medications    Current Outpatient Medications:   •  albuterol sulfate  (90 Base) MCG/ACT inhaler, Inhale 2 puffs Every 6 (Six) Hours As Needed for Wheezing or Shortness of Air., Disp: 18 g, Rfl: 5  •  cholecalciferol (VITAMIN D3) 1000 units tablet, Take 1,000 Units by mouth Every Morning., Disp: , Rfl:   •  Crisaborole (Eucrisa) 2 % ointment, Apply 5 g topically Daily As Needed (eczema)., Disp: 60 g, Rfl: 5  •  Docusate Calcium (STOOL SOFTENER PO), Take 1 tablet by mouth Every Morning., Disp: , Rfl:   •  famotidine (Pepcid) 20 MG tablet, Take 1 tablet by mouth 2 (Two) Times a Day. (Patient taking differently: Take 20 mg by mouth As Needed.), Disp: 60 tablet, Rfl: 0  •  Ferrous Sulfate (IRON PO), Take 1 tablet by mouth Daily., Disp: , Rfl:   •  fluticasone (FLONASE) 50 MCG/ACT nasal spray, SPRAY TWO SPRAYS IN EACH NOSTRIL ONCE DAILY (Patient taking differently: 2 sprays into the nostril(s) as directed by provider As Needed. SPRAY TWO SPRAYS IN EACH NOSTRIL ONCE DAILY), Disp: 18.2 mL, Rfl: 5  •  gabapentin (NEURONTIN) 300 MG capsule, Take 1 capsule by mouth 3 (Three) Times a Day. (Patient taking differently: Take 300 mg by mouth At Night As Needed.), Disp: 90 capsule, Rfl: 1  •  levocetirizine (XYZAL) 5 MG tablet, Take 1  tablet by mouth Every Morning., Disp: 90 tablet, Rfl: 3  •  meclizine (ANTIVERT) 25 MG tablet, Take 1 tablet by mouth 3 (Three) Times a Day As Needed for Dizziness., Disp: 90 tablet, Rfl: 0  •  Multiple Vitamins-Minerals (MULTIVITAMIN ADULT PO), Take 1 tablet by mouth Every Morning., Disp: , Rfl:   •  pantoprazole (PROTONIX) 40 MG EC tablet, Take 1 tablet by mouth Daily., Disp: 90 tablet, Rfl: 3  •  tiZANidine (ZANAFLEX) 4 MG tablet, Take 1 tablet by mouth At Night As Needed for Muscle Spasms., Disp: 90 tablet, Rfl: 3  •  vitamin B-12 (CYANOCOBALAMIN) 1000 MCG tablet, Take 1,000 mcg by mouth Every Morning., Disp: , Rfl:   •  vitamin C (ASCORBIC ACID) 500 MG tablet, Take 500 mg by mouth Every Morning., Disp: , Rfl:           ROS  ROS      SOCIAL HX  Social History     Socioeconomic History   • Marital status:      Spouse name: [place]   • Number of children: 1   • Years of education: 18   Tobacco Use   • Smoking status: Former Smoker     Packs/day: 1.00     Years: 20.00     Pack years: 20.00     Types: Cigarettes     Quit date: 2007     Years since quittin.3   • Smokeless tobacco: Never Used   Vaping Use   • Vaping Use: Never used   Substance and Sexual Activity   • Alcohol use: No   • Drug use: No   • Sexual activity: Defer       FAMILY HX  Family History   Problem Relation Age of Onset   • Cancer Mother    • Hypertension Father    • Hypertension Brother    • Breast cancer Neg Hx              Torsten Sandoval III, MD, FACC

## 2022-02-03 ENCOUNTER — HOSPITAL ENCOUNTER (OUTPATIENT)
Dept: CARDIOLOGY | Facility: HOSPITAL | Age: 62
Discharge: HOME | End: 2022-02-03

## 2022-02-03 VITALS
BODY MASS INDEX: 38.41 KG/M2 | HEIGHT: 64 IN | HEART RATE: 68 BPM | WEIGHT: 225 LBS | DIASTOLIC BLOOD PRESSURE: 82 MMHG | SYSTOLIC BLOOD PRESSURE: 146 MMHG

## 2022-02-03 DIAGNOSIS — R55 SYNCOPE AND COLLAPSE: ICD-10-CM

## 2022-02-03 LAB
BH CV ECHO MEAS - AO MAX PG (FULL): 3.6 MMHG
BH CV ECHO MEAS - AO MAX PG: 6.4 MMHG
BH CV ECHO MEAS - AO MEAN PG (FULL): 2.4 MMHG
BH CV ECHO MEAS - AO MEAN PG: 3.8 MMHG
BH CV ECHO MEAS - AO ROOT AREA (BSA CORRECTED): 1.4
BH CV ECHO MEAS - AO ROOT AREA: 6.9 CM^2
BH CV ECHO MEAS - AO ROOT DIAM: 3 CM
BH CV ECHO MEAS - AO V2 MAX: 126.4 CM/SEC
BH CV ECHO MEAS - AO V2 MEAN: 92.1 CM/SEC
BH CV ECHO MEAS - AO V2 VTI: 27.6 CM
BH CV ECHO MEAS - ASC AORTA: 2.8 CM
BH CV ECHO MEAS - AVA(I,A): 2.2 CM^2
BH CV ECHO MEAS - AVA(I,D): 2.2 CM^2
BH CV ECHO MEAS - AVA(V,A): 2 CM^2
BH CV ECHO MEAS - AVA(V,D): 2 CM^2
BH CV ECHO MEAS - BSA(HAYCOCK): 2.2 M^2
BH CV ECHO MEAS - BSA: 2.1 M^2
BH CV ECHO MEAS - BZI_BMI: 38.6 KILOGRAMS/M^2
BH CV ECHO MEAS - BZI_METRIC_HEIGHT: 162.6 CM
BH CV ECHO MEAS - BZI_METRIC_WEIGHT: 102.1 KG
BH CV ECHO MEAS - EDV(CUBED): 113.7 ML
BH CV ECHO MEAS - EDV(MOD-SP2): 80 ML
BH CV ECHO MEAS - EDV(MOD-SP4): 77 ML
BH CV ECHO MEAS - EDV(TEICH): 109.8 ML
BH CV ECHO MEAS - EF(CUBED): 84.7 %
BH CV ECHO MEAS - EF(MOD-BP): 60 %
BH CV ECHO MEAS - EF(MOD-SP2): 60 %
BH CV ECHO MEAS - EF(MOD-SP4): 59.7 %
BH CV ECHO MEAS - EF(TEICH): 77.8 %
BH CV ECHO MEAS - ESV(CUBED): 17.4 ML
BH CV ECHO MEAS - ESV(MOD-SP2): 32 ML
BH CV ECHO MEAS - ESV(MOD-SP4): 31 ML
BH CV ECHO MEAS - ESV(TEICH): 24.4 ML
BH CV ECHO MEAS - FS: 46.5 %
BH CV ECHO MEAS - IVS/LVPW: 0.97
BH CV ECHO MEAS - IVSD: 0.94 CM
BH CV ECHO MEAS - LA DIMENSION: 3.8 CM
BH CV ECHO MEAS - LA/AO: 1.3
BH CV ECHO MEAS - LAD MAJOR: 5.6 CM
BH CV ECHO MEAS - LAT PEAK E' VEL: 6.8 CM/SEC
BH CV ECHO MEAS - LATERAL E/E' RATIO: 7.6
BH CV ECHO MEAS - LV DIASTOLIC VOL/BSA (35-75): 37.4 ML/M^2
BH CV ECHO MEAS - LV IVRT: 0.06 SEC
BH CV ECHO MEAS - LV MASS(C)D: 163.5 GRAMS
BH CV ECHO MEAS - LV MASS(C)DI: 79.5 GRAMS/M^2
BH CV ECHO MEAS - LV MAX PG: 2.8 MMHG
BH CV ECHO MEAS - LV MEAN PG: 1.3 MMHG
BH CV ECHO MEAS - LV SYSTOLIC VOL/BSA (12-30): 15.1 ML/M^2
BH CV ECHO MEAS - LV V1 MAX: 83.4 CM/SEC
BH CV ECHO MEAS - LV V1 MEAN: 54.4 CM/SEC
BH CV ECHO MEAS - LV V1 VTI: 19.9 CM
BH CV ECHO MEAS - LVIDD: 4.8 CM
BH CV ECHO MEAS - LVIDS: 2.6 CM
BH CV ECHO MEAS - LVLD AP2: 7.6 CM
BH CV ECHO MEAS - LVLD AP4: 7.6 CM
BH CV ECHO MEAS - LVLS AP2: 5.7 CM
BH CV ECHO MEAS - LVLS AP4: 5.6 CM
BH CV ECHO MEAS - LVOT AREA (M): 3.1 CM^2
BH CV ECHO MEAS - LVOT AREA: 3.1 CM^2
BH CV ECHO MEAS - LVOT DIAM: 2 CM
BH CV ECHO MEAS - LVPWD: 0.98 CM
BH CV ECHO MEAS - MED PEAK E' VEL: 8.1 CM/SEC
BH CV ECHO MEAS - MEDIAL E/E' RATIO: 6.4
BH CV ECHO MEAS - MV A MAX VEL: 67.1 CM/SEC
BH CV ECHO MEAS - MV DEC SLOPE: 325.2 CM/SEC^2
BH CV ECHO MEAS - MV DEC TIME: 0.22 SEC
BH CV ECHO MEAS - MV E MAX VEL: 52.8 CM/SEC
BH CV ECHO MEAS - MV E/A: 0.79
BH CV ECHO MEAS - MV MAX PG: 2.9 MMHG
BH CV ECHO MEAS - MV MEAN PG: 1.2 MMHG
BH CV ECHO MEAS - MV P1/2T MAX VEL: 77.8 CM/SEC
BH CV ECHO MEAS - MV P1/2T: 70 MSEC
BH CV ECHO MEAS - MV V2 MAX: 85.1 CM/SEC
BH CV ECHO MEAS - MV V2 MEAN: 52.4 CM/SEC
BH CV ECHO MEAS - MV V2 VTI: 23.3 CM
BH CV ECHO MEAS - MVA P1/2T LCG: 2.8 CM^2
BH CV ECHO MEAS - MVA(P1/2T): 3.1 CM^2
BH CV ECHO MEAS - MVA(VTI): 2.6 CM^2
BH CV ECHO MEAS - PA ACC SLOPE: 321.5 CM/SEC^2
BH CV ECHO MEAS - PA ACC TIME: 0.2 SEC
BH CV ECHO MEAS - PA MAX PG: 4.7 MMHG
BH CV ECHO MEAS - PA PR(ACCEL): -12.9 MMHG
BH CV ECHO MEAS - PA V2 MAX: 108.6 CM/SEC
BH CV ECHO MEAS - SI(AO): 92.3 ML/M^2
BH CV ECHO MEAS - SI(CUBED): 46.8 ML/M^2
BH CV ECHO MEAS - SI(LVOT): 29.7 ML/M^2
BH CV ECHO MEAS - SI(MOD-SP2): 23.3 ML/M^2
BH CV ECHO MEAS - SI(MOD-SP4): 22.4 ML/M^2
BH CV ECHO MEAS - SI(TEICH): 41.6 ML/M^2
BH CV ECHO MEAS - SV(AO): 189.7 ML
BH CV ECHO MEAS - SV(CUBED): 96.3 ML
BH CV ECHO MEAS - SV(LVOT): 61 ML
BH CV ECHO MEAS - SV(MOD-SP2): 48 ML
BH CV ECHO MEAS - SV(MOD-SP4): 46 ML
BH CV ECHO MEAS - SV(TEICH): 85.5 ML
BH CV ECHO MEAS - TAPSE (>1.6): 2.1 CM
BH CV ECHO MEASUREMENTS AVERAGE E/E' RATIO: 7.09
BH CV VAS BP LEFT ARM: NORMAL MMHG
BH CV XLRA - RV BASE: 3.5 CM
BH CV XLRA - RV LENGTH: 5 CM
BH CV XLRA - RV MID: 2.6 CM
BH CV XLRA - TDI S': 11.7 CM/SEC
LEFT ATRIUM VOLUME INDEX: 24.8 ML/M^2
LEFT ATRIUM VOLUME: 51 ML
MAXIMAL PREDICTED HEART RATE: 160 BPM
MAXIMAL PREDICTED HEART RATE: 160 BPM
STRESS TARGET HR: 136 BPM
STRESS TARGET HR: 136 BPM

## 2022-02-03 PROCEDURE — 25010000002 SULFUR HEXAFLUORIDE MICROSPH 60.7-25 MG RECONSTITUTED SUSPENSION: Performed by: INTERNAL MEDICINE

## 2022-02-03 PROCEDURE — 93306 TTE W/DOPPLER COMPLETE: CPT

## 2022-02-03 PROCEDURE — 93660 TILT TABLE EVALUATION: CPT

## 2022-02-03 PROCEDURE — 93306 TTE W/DOPPLER COMPLETE: CPT | Performed by: INTERNAL MEDICINE

## 2022-02-03 PROCEDURE — 93660 TILT TABLE EVALUATION: CPT | Performed by: INTERNAL MEDICINE

## 2022-02-03 RX ADMIN — SULFUR HEXAFLUORIDE 4 ML: KIT at 09:50

## 2022-02-07 ENCOUNTER — TELEPHONE (OUTPATIENT)
Dept: CARDIOLOGY | Facility: CLINIC | Age: 62
End: 2022-02-07

## 2022-02-07 NOTE — TELEPHONE ENCOUNTER
----- Message from Torsten Sandoval III, MD sent at 2/7/2022  2:53 PM EST -----  No significant abnormalities noted on transthoracic echocardiogram.

## 2022-02-07 NOTE — TELEPHONE ENCOUNTER
Notified of message above from . Stated that was not what she read on line. Had an episode this morning where she was felt like she was going to pass out and had chest pain just like in the past. Still wearing her Monitor. Please advise.

## 2022-02-08 NOTE — TELEPHONE ENCOUNTER
No significant abnormalities noted on echo.  We will discuss further follow-up.  Unremarkable tilt table test.  Will review results of monitor when available.

## 2022-03-08 ENCOUNTER — OFFICE VISIT (OUTPATIENT)
Dept: CARDIOLOGY | Facility: CLINIC | Age: 62
End: 2022-03-08

## 2022-03-08 VITALS
BODY MASS INDEX: 37.56 KG/M2 | HEIGHT: 64 IN | OXYGEN SATURATION: 95 % | SYSTOLIC BLOOD PRESSURE: 118 MMHG | WEIGHT: 220 LBS | DIASTOLIC BLOOD PRESSURE: 62 MMHG | HEART RATE: 80 BPM

## 2022-03-08 DIAGNOSIS — R55 SYNCOPE AND COLLAPSE: Primary | ICD-10-CM

## 2022-03-08 PROCEDURE — 99213 OFFICE O/P EST LOW 20 MIN: CPT | Performed by: INTERNAL MEDICINE

## 2022-03-08 NOTE — PROGRESS NOTES
[facility] Card[facility]  Office visit  Jennifer Horne  1960  [phone]  There is no work phone number on file.    VISIT DATE:  3/8/2022    PCP: Aisha Coffman PA  [address] [facility] [address]    CC:  Chief Complaint   Patient presents with   • syncope and collapse        Previous cardiac studies and procedures:  September 2019 cardiac catheterization  · Angiographically normal coronary arteries  · Normal LV systolic function  · No aortic stenosis  · No mitral regurgitation  · Elevated LVEDP    February 2022  Tilt table: Negative  TTE: Normal EF, grade 1 diastolic dysfunction  Cardiac event monitor: Normal, symptoms correlate with sinus rhythm.    ASSESSMENT:   Diagnosis Plan   1. Syncope and collapse     Episodes of both orthostatic and postprandial hypotension.    PLAN:  Low risk cardiac evaluation revealing normal myocardial structure and function and no significant structural heart disease.  Counseled on need to avoid dehydration and liberalizing salt intake.  Will decrease meal size, increase protein intake and limit carbohydrate loads to avoid postprandial hypotension.  Encouraged regular exercise.  If symptoms worsen she may need to be evaluated by gastroenterology given her history of gastric bypass surgery.    Subjective  Interval assessment: Episode of syncope back in December.  She had been feeling poorly the day before.  Got up in the middle of the night to go the bathroom and experienced 2 episodes of syncope.   reports that she was not breathing at the time although this cannot be confirmed.  She does not remember the episode very well.  Evaluation in the emergency department was unremarkable.  She has had intermittent episodes of lightheadedness in the standing position which resolved in the seated position or propping her feet up.  Blood pressures tend to run less than 120/80 mmHg.  She had been on diltiazem for muscle cramps which was  "discontinued about a month ago.    More recently she has had a couple mild episodes of presyncope which occur only after meals while she is sitting down.  She has noticed that her resting heart rate will be slightly elevated around 100 bpm for about an hour after eating and it does feel like it is beating slightly more forcefully.  Blood pressures running less than 120/80 mmHg.  Currently not participating regular exercise.    Initial evaluation: 59-year-old female who has had recurrent episodes of precordial chest discomfort.  Describes a dull smothering sensation in her chest which often radiates to her back between her scapula.  Intermittently triggered by exertion, relieved with rest.  Also intermittently improved with sublingual nitroglycerin.  Onset over the previous 6 to 8 weeks.  Some mild fatigue and shortness of breath, at least one episode of moderate severe chest discomfort with associated diaphoresis and nausea.  She underwent recent echo which revealed normal LV systolic function with no underlying valvular heart disease.  She underwent an exercise myocardial perfusion imaging which revealed preserved functional capacity but did have dynamic ST repolarization abnormalities in addition to an anterior reversible perfusion defect.  She has had previous issues with dyspepsia and gastroesophageal reflux disease however these episodes have not consistently been triggered with certain types of food.    PHYSICAL EXAMINATION:  Vitals:    03/08/22 1423   BP: 118/62   BP Location: Left arm   Patient Position: Sitting   Pulse: 80   SpO2: 95%   Weight: 99.8 kg (220 lb)   Height: 162.6 cm (64\")     General Appearance:    Alert, cooperative, no distress, appears stated age   Head:    Normocephalic, without obvious abnormality, atraumatic   Eyes:    conjunctiva/corneas clear   Nose:   Nares normal, septum midline, mucosa normal, no drainage   Throat:   Lips, teeth and gums normal   Neck:   Supple, symmetrical, trachea " midline, no carotid    bruit or JVD   Lungs:     Clear to auscultation bilaterally, respirations unlabored   Chest Wall:    No tenderness or deformity    Heart:    Regular rate and rhythm, S1 and S2 normal, no murmur, rub   or gallop, normal carotid impulse bilaterally without bruit.   Abdomen:     Soft, non-tender   Extremities:   Extremities normal, atraumatic, no cyanosis or edema   Pulses:   2+ and symmetric all extremities   Skin:   Skin color, texture, turgor normal, no rashes or lesions       Diagnostic Data:  Procedures  Lab Results   Component Value Date    CHLPL 181 07/06/2016    TRIG 118 11/19/2021    HDL 53 11/19/2021     Lab Results   Component Value Date    GLUCOSE 132 (H) 12/11/2021    BUN 18 12/11/2021    CREATININE 0.79 12/11/2021     12/11/2021    K 3.8 12/11/2021     12/11/2021    CO2 25.5 12/11/2021     Lab Results   Component Value Date    HGBA1C 5.70 (H) 11/19/2021     Lab Results   Component Value Date    WBC 8.44 12/11/2021    HGB 12.1 12/11/2021    HCT 38.2 12/11/2021     12/11/2021       Allergies  Allergies   Allergen Reactions   • Evista [Raloxifene] Shortness Of Breath     Syncope and chest pain   • Compazine [Prochlorperazine] Myalgia     Muscle spasms specifically that resembled seizure activity   • Morphine Nausea And Vomiting and Other (See Comments)     Abdominal pains       Current Medications    Current Outpatient Medications:   •  albuterol sulfate  (90 Base) MCG/ACT inhaler, Inhale 2 puffs Every 6 (Six) Hours As Needed for Wheezing or Shortness of Air., Disp: 18 g, Rfl: 5  •  cholecalciferol (VITAMIN D3) 1000 units tablet, Take 1,000 Units by mouth Every Morning., Disp: , Rfl:   •  Crisaborole (Eucrisa) 2 % ointment, Apply 5 g topically Daily As Needed (eczema)., Disp: 60 g, Rfl: 5  •  Docusate Calcium (STOOL SOFTENER PO), Take 1 tablet by mouth Every Morning., Disp: , Rfl:   •  famotidine (Pepcid) 20 MG tablet, Take 1 tablet by mouth 2 (Two) Times a  Day. (Patient taking differently: Take 20 mg by mouth As Needed.), Disp: 60 tablet, Rfl: 0  •  Ferrous Sulfate (IRON PO), Take 1 tablet by mouth Daily., Disp: , Rfl:   •  fluticasone (FLONASE) 50 MCG/ACT nasal spray, SPRAY TWO SPRAYS IN EACH NOSTRIL ONCE DAILY (Patient taking differently: 2 sprays into the nostril(s) as directed by provider As Needed. SPRAY TWO SPRAYS IN EACH NOSTRIL ONCE DAILY), Disp: 18.2 mL, Rfl: 5  •  gabapentin (NEURONTIN) 300 MG capsule, Take 1 capsule by mouth 3 (Three) Times a Day. (Patient taking differently: Take 300 mg by mouth At Night As Needed.), Disp: 90 capsule, Rfl: 1  •  levocetirizine (XYZAL) 5 MG tablet, Take 1 tablet by mouth Every Morning., Disp: 90 tablet, Rfl: 3  •  meclizine (ANTIVERT) 25 MG tablet, Take 1 tablet by mouth 3 (Three) Times a Day As Needed for Dizziness., Disp: 90 tablet, Rfl: 0  •  Multiple Vitamins-Minerals (MULTIVITAMIN ADULT PO), Take 1 tablet by mouth Every Morning., Disp: , Rfl:   •  pantoprazole (PROTONIX) 40 MG EC tablet, Take 1 tablet by mouth Daily., Disp: 90 tablet, Rfl: 3  •  tiZANidine (ZANAFLEX) 4 MG tablet, Take 1 tablet by mouth At Night As Needed for Muscle Spasms., Disp: 90 tablet, Rfl: 3  •  vitamin B-12 (CYANOCOBALAMIN) 1000 MCG tablet, Take 1,000 mcg by mouth Every Morning., Disp: , Rfl:   •  vitamin C (ASCORBIC ACID) 500 MG tablet, Take 500 mg by mouth Every Morning., Disp: , Rfl:           ROS  ROS      SOCIAL HX  Social History     Socioeconomic History   • Marital status:      Spouse name: [place]   • Number of children: 1   • Years of education: 18   Tobacco Use   • Smoking status: Former Smoker     Packs/day: 1.00     Years: 20.00     Pack years: 20.00     Types: Cigarettes     Quit date: 2007     Years since quittin.4   • Smokeless tobacco: Never Used   Vaping Use   • Vaping Use: Never used   Substance and Sexual Activity   • Alcohol use: No   • Drug use: No   • Sexual activity: Defer       FAMILY HX  Family History    Problem Relation Age of Onset   • Cancer Mother    • Hypertension Father    • Hypertension Brother    • Breast cancer Neg Hx              Torsten Sandoval III, MD, FACC

## 2022-05-26 DIAGNOSIS — B02.23 POST-HERPETIC POLYNEUROPATHY: ICD-10-CM

## 2022-05-28 RX ORDER — GABAPENTIN 300 MG/1
CAPSULE ORAL
Qty: 90 CAPSULE | Refills: 0 | Status: SHIPPED | OUTPATIENT
Start: 2022-05-28 | End: 2022-08-12

## 2022-06-03 ENCOUNTER — OFFICE VISIT (OUTPATIENT)
Dept: FAMILY MEDICINE CLINIC | Facility: CLINIC | Age: 62
End: 2022-06-03

## 2022-06-03 VITALS
WEIGHT: 222 LBS | OXYGEN SATURATION: 99 % | SYSTOLIC BLOOD PRESSURE: 128 MMHG | DIASTOLIC BLOOD PRESSURE: 86 MMHG | HEART RATE: 75 BPM | TEMPERATURE: 97.3 F | HEIGHT: 64 IN | BODY MASS INDEX: 37.9 KG/M2

## 2022-06-03 DIAGNOSIS — E55.9 VITAMIN D DEFICIENCY: Chronic | ICD-10-CM

## 2022-06-03 DIAGNOSIS — R30.0 DYSURIA: ICD-10-CM

## 2022-06-03 DIAGNOSIS — J30.1 NON-SEASONAL ALLERGIC RHINITIS DUE TO POLLEN: Chronic | ICD-10-CM

## 2022-06-03 DIAGNOSIS — B02.23 POST-HERPETIC POLYNEUROPATHY: Chronic | ICD-10-CM

## 2022-06-03 DIAGNOSIS — N30.90 CYSTITIS: Primary | Chronic | ICD-10-CM

## 2022-06-03 LAB
BILIRUB BLD-MCNC: NEGATIVE MG/DL
CLARITY, POC: CLEAR
COLOR UR: YELLOW
EXPIRATION DATE: ABNORMAL
GLUCOSE UR STRIP-MCNC: NEGATIVE MG/DL
KETONES UR QL: NEGATIVE
LEUKOCYTE EST, POC: ABNORMAL
Lab: ABNORMAL
NITRITE UR-MCNC: NEGATIVE MG/ML
PH UR: 8.5 [PH] (ref 5–8)
PROT UR STRIP-MCNC: ABNORMAL MG/DL
RBC # UR STRIP: NEGATIVE /UL
SP GR UR: 1.01 (ref 1–1.03)
UROBILINOGEN UR QL: NORMAL

## 2022-06-03 PROCEDURE — 87086 URINE CULTURE/COLONY COUNT: CPT | Performed by: PHYSICIAN ASSISTANT

## 2022-06-03 PROCEDURE — 99214 OFFICE O/P EST MOD 30 MIN: CPT | Performed by: PHYSICIAN ASSISTANT

## 2022-06-03 PROCEDURE — 81003 URINALYSIS AUTO W/O SCOPE: CPT | Performed by: PHYSICIAN ASSISTANT

## 2022-06-03 RX ORDER — FLUCONAZOLE 150 MG/1
150 TABLET ORAL DAILY
Qty: 5 TABLET | Refills: 0 | Status: SHIPPED | OUTPATIENT
Start: 2022-06-03 | End: 2022-06-14

## 2022-06-03 RX ORDER — NITROFURANTOIN 25; 75 MG/1; MG/1
100 CAPSULE ORAL 2 TIMES DAILY
Qty: 20 CAPSULE | Refills: 0 | Status: SHIPPED | OUTPATIENT
Start: 2022-06-03 | End: 2022-06-14

## 2022-06-03 NOTE — PROGRESS NOTES
"Subjective   Jennifer Horne is a 62 y.o. female.       Chief Complaint -dysuria    History of Present Illness -    ROS    Dysuria-  She complains of burning with urination that began 2 days ago.    Vitamin D deficiency-stable with vitamin D supplementation    Postherpetic neuropathy-stable with gabapentin    Allergic rhinitis-stable with Flonase and Xyzal    The following portions of the patient's history were reviewed and updated as appropriate: allergies, current medications, past family history, past medical history, past social history, past surgical history and problem list.    Review of Systems    Objective  Vital signs:  /86   Pulse 75   Temp 97.3 °F (36.3 °C) (Temporal)   Ht 162.6 cm (64\")   Wt 101 kg (222 lb)   SpO2 99%   BMI 38.11 kg/m²     Physical Exam  Vitals and nursing note reviewed.   Constitutional:       Appearance: Normal appearance. She is well-developed.   Eyes:      Extraocular Movements: Extraocular movements intact.      Conjunctiva/sclera: Conjunctivae normal.   Cardiovascular:      Rate and Rhythm: Normal rate and regular rhythm.      Heart sounds: Normal heart sounds. No murmur heard.  Pulmonary:      Effort: Pulmonary effort is normal. No respiratory distress.      Breath sounds: Normal breath sounds. No wheezing.   Musculoskeletal:         General: No tenderness.   Skin:     General: Skin is warm and dry.      Findings: No rash.   Neurological:      Mental Status: She is alert and oriented to person, place, and time.   Psychiatric:         Mood and Affect: Mood normal.         Behavior: Behavior normal.         Thought Content: Thought content normal.         The following data was reviewed by: MAIK An on 06/03/2022:  CMP    CMP 11/19/21 12/11/21   Glucose 93 132 (A)   BUN 12 18   Creatinine 0.72 0.79   eGFR Non African Am 83 74   Sodium 139 138   Potassium 4.3 3.8   Chloride 103 104   Calcium 9.6 9.1   Albumin 4.50 3.99   Total Bilirubin 0.5 0.4   Alkaline " Phosphatase 91 92   AST (SGOT) 19 21   ALT (SGPT) 17 15   (A) Abnormal value       Comments are available for some flowsheets but are not being displayed.           CBC w/diff    CBC w/Diff 12/11/21   WBC 8.44   RBC 4.37   Hemoglobin 12.1   Hematocrit 38.2   MCV 87.4   MCH 27.7   MCHC 31.7   RDW 13.4   Platelets 235   Neutrophil Rel % 76.5 (A)   Immature Granulocyte Rel % 0.4   Lymphocyte Rel % 14.6 (A)   Monocyte Rel % 7.0   Eosinophil Rel % 1.1   Basophil Rel % 0.4   (A) Abnormal value            Lipid Panel    Lipid Panel 11/19/21   Total Cholesterol 187   Triglycerides 118   HDL Cholesterol 53   VLDL Cholesterol 21   LDL Cholesterol  113 (A)   LDL/HDL Ratio 2.08   (A) Abnormal value            TSH    TSH 11/19/21   TSH 1.330           Most Recent A1C    HGBA1C Most Recent 11/19/21   Hemoglobin A1C 5.70 (A)   (A) Abnormal value            Data reviewed: Urinalysis reviewed       Assessment & Plan     Diagnoses and all orders for this visit:    1. Cystitis (Primary)  Comments:  Start Macrobid  RTC if symptoms do not improve within 7 days  Orders:  -     nitrofurantoin, macrocrystal-monohydrate, (Macrobid) 100 MG capsule; Take 1 capsule by mouth 2 (Two) Times a Day.  Dispense: 20 capsule; Refill: 0    2. Dysuria  Comments:  Urinalysis reviewed and sent for culture  Orders:  -     POCT urinalysis dipstick, automated  -     Urine Culture - Urine, Urine, Clean Catch  -     fluconazole (Diflucan) 150 MG tablet; Take 1 tablet by mouth Daily.  Dispense: 5 tablet; Refill: 0    3. Vitamin D deficiency  Comments:  Continue vitamin D supplementation    4. Post-herpetic polyneuropathy  Comments:  Continue gabapentin    5. Non-seasonal allergic rhinitis due to pollen  Comments:  Continue Flonase and Xyzal            Patient was given instructions and counseling regarding his condition or for health maintenance advice. Please see specific information pulled into the AVS if appropriate      This document has been electronically  signed by:  Aisha Coffman PA-C

## 2022-06-04 LAB — BACTERIA SPEC AEROBE CULT: NO GROWTH

## 2022-06-14 ENCOUNTER — OFFICE VISIT (OUTPATIENT)
Dept: FAMILY MEDICINE CLINIC | Facility: CLINIC | Age: 62
End: 2022-06-14

## 2022-06-14 VITALS
DIASTOLIC BLOOD PRESSURE: 55 MMHG | SYSTOLIC BLOOD PRESSURE: 110 MMHG | BODY MASS INDEX: 37.94 KG/M2 | WEIGHT: 222.2 LBS | OXYGEN SATURATION: 95 % | HEIGHT: 64 IN | TEMPERATURE: 97.4 F | HEART RATE: 77 BPM

## 2022-06-14 DIAGNOSIS — R55 SYNCOPE, UNSPECIFIED SYNCOPE TYPE: Primary | Chronic | ICD-10-CM

## 2022-06-14 DIAGNOSIS — M81.0 SENILE OSTEOPOROSIS: Chronic | ICD-10-CM

## 2022-06-14 DIAGNOSIS — Z79.899 LONG-TERM USE OF HIGH-RISK MEDICATION: ICD-10-CM

## 2022-06-14 DIAGNOSIS — Z13.220 SCREENING FOR HYPERLIPIDEMIA: ICD-10-CM

## 2022-06-14 DIAGNOSIS — E55.9 VITAMIN D DEFICIENCY: Chronic | ICD-10-CM

## 2022-06-14 PROCEDURE — 82306 VITAMIN D 25 HYDROXY: CPT | Performed by: PHYSICIAN ASSISTANT

## 2022-06-14 PROCEDURE — 80050 GENERAL HEALTH PANEL: CPT | Performed by: PHYSICIAN ASSISTANT

## 2022-06-14 PROCEDURE — 93005 ELECTROCARDIOGRAM TRACING: CPT | Performed by: PHYSICIAN ASSISTANT

## 2022-06-14 PROCEDURE — 83036 HEMOGLOBIN GLYCOSYLATED A1C: CPT | Performed by: PHYSICIAN ASSISTANT

## 2022-06-14 PROCEDURE — 99214 OFFICE O/P EST MOD 30 MIN: CPT | Performed by: PHYSICIAN ASSISTANT

## 2022-06-14 PROCEDURE — 80061 LIPID PANEL: CPT | Performed by: PHYSICIAN ASSISTANT

## 2022-06-15 LAB
25(OH)D3 SERPL-MCNC: 63.9 NG/ML (ref 30–100)
ALBUMIN SERPL-MCNC: 4.5 G/DL (ref 3.5–5.2)
ALBUMIN/GLOB SERPL: 1.5 G/DL
ALP SERPL-CCNC: 86 U/L (ref 39–117)
ALT SERPL W P-5'-P-CCNC: 21 U/L (ref 1–33)
ANION GAP SERPL CALCULATED.3IONS-SCNC: 15.2 MMOL/L (ref 5–15)
AST SERPL-CCNC: 27 U/L (ref 1–32)
BASOPHILS # BLD AUTO: 0.03 10*3/MM3 (ref 0–0.2)
BASOPHILS NFR BLD AUTO: 0.6 % (ref 0–1.5)
BILIRUB SERPL-MCNC: 0.4 MG/DL (ref 0–1.2)
BUN SERPL-MCNC: 11 MG/DL (ref 8–23)
BUN/CREAT SERPL: 15.5 (ref 7–25)
CALCIUM SPEC-SCNC: 9.7 MG/DL (ref 8.6–10.5)
CHLORIDE SERPL-SCNC: 103 MMOL/L (ref 98–107)
CHOLEST SERPL-MCNC: 185 MG/DL (ref 0–200)
CO2 SERPL-SCNC: 24.8 MMOL/L (ref 22–29)
CREAT SERPL-MCNC: 0.71 MG/DL (ref 0.57–1)
DEPRECATED RDW RBC AUTO: 40.2 FL (ref 37–54)
EGFRCR SERPLBLD CKD-EPI 2021: 97.5 ML/MIN/1.73
EOSINOPHIL # BLD AUTO: 0.11 10*3/MM3 (ref 0–0.4)
EOSINOPHIL NFR BLD AUTO: 2.1 % (ref 0.3–6.2)
ERYTHROCYTE [DISTWIDTH] IN BLOOD BY AUTOMATED COUNT: 13 % (ref 12.3–15.4)
GLOBULIN UR ELPH-MCNC: 3 GM/DL
GLUCOSE SERPL-MCNC: 83 MG/DL (ref 65–99)
HBA1C MFR BLD: 5.9 % (ref 4.8–5.6)
HCT VFR BLD AUTO: 42 % (ref 34–46.6)
HDLC SERPL-MCNC: 56 MG/DL (ref 40–60)
HGB BLD-MCNC: 13.5 G/DL (ref 12–15.9)
IMM GRANULOCYTES # BLD AUTO: 0.01 10*3/MM3 (ref 0–0.05)
IMM GRANULOCYTES NFR BLD AUTO: 0.2 % (ref 0–0.5)
LDLC SERPL CALC-MCNC: 106 MG/DL (ref 0–100)
LDLC/HDLC SERPL: 1.83 {RATIO}
LYMPHOCYTES # BLD AUTO: 1.44 10*3/MM3 (ref 0.7–3.1)
LYMPHOCYTES NFR BLD AUTO: 27.3 % (ref 19.6–45.3)
MCH RBC QN AUTO: 27.8 PG (ref 26.6–33)
MCHC RBC AUTO-ENTMCNC: 32.1 G/DL (ref 31.5–35.7)
MCV RBC AUTO: 86.4 FL (ref 79–97)
MONOCYTES # BLD AUTO: 0.38 10*3/MM3 (ref 0.1–0.9)
MONOCYTES NFR BLD AUTO: 7.2 % (ref 5–12)
NEUTROPHILS NFR BLD AUTO: 3.3 10*3/MM3 (ref 1.7–7)
NEUTROPHILS NFR BLD AUTO: 62.6 % (ref 42.7–76)
NRBC BLD AUTO-RTO: 0 /100 WBC (ref 0–0.2)
PLATELET # BLD AUTO: 233 10*3/MM3 (ref 140–450)
PMV BLD AUTO: 11.3 FL (ref 6–12)
POTASSIUM SERPL-SCNC: 4.3 MMOL/L (ref 3.5–5.2)
PROT SERPL-MCNC: 7.5 G/DL (ref 6–8.5)
RBC # BLD AUTO: 4.86 10*6/MM3 (ref 3.77–5.28)
SODIUM SERPL-SCNC: 143 MMOL/L (ref 136–145)
TRIGL SERPL-MCNC: 133 MG/DL (ref 0–150)
TSH SERPL DL<=0.05 MIU/L-ACNC: 1.2 UIU/ML (ref 0.27–4.2)
VLDLC SERPL-MCNC: 23 MG/DL (ref 5–40)
WBC NRBC COR # BLD: 5.27 10*3/MM3 (ref 3.4–10.8)

## 2022-06-17 NOTE — PATIENT INSTRUCTIONS
Fall Prevention in the Home, Adult  Falls can cause injuries and can happen to people of all ages. There are many things you can do to make your home safe and to help prevent falls. Ask for help when making these changes.  What actions can I take to prevent falls?  General Instructions  Use good lighting in all rooms. Replace any light bulbs that burn out.  Turn on the lights in dark areas. Use night-lights.  Keep items that you use often in easy-to-reach places. Lower the shelves around your home if needed.  Set up your furniture so you have a clear path. Avoid moving your furniture around.  Do not have throw rugs or other things on the floor that can make you trip.  Avoid walking on wet floors.  If any of your floors are uneven, fix them.  Add color or contrast paint or tape to clearly nhan and help you see:  Grab bars or handrails.  First and last steps of staircases.  Where the edge of each step is.  If you use a stepladder:  Make sure that it is fully opened. Do not climb a closed stepladder.  Make sure the sides of the stepladder are locked in place.  Ask someone to hold the stepladder while you use it.  Know where your pets are when moving through your home.  What can I do in the bathroom?         Keep the floor dry. Clean up any water on the floor right away.  Remove soap buildup in the tub or shower.  Use nonskid mats or decals on the floor of the tub or shower.  Attach bath mats securely with double-sided, nonslip rug tape.  If you need to sit down in the shower, use a plastic, nonslip stool.  Install grab bars by the toilet and in the tub and shower. Do not use towel bars as grab bars.  What can I do in the bedroom?  Make sure that you have a light by your bed that is easy to reach.  Do not use any sheets or blankets for your bed that hang to the floor.  Have a firm chair with side arms that you can use for support when you get dressed.  What can I do in the kitchen?  Clean up any spills right away.  If  you need to reach something above you, use a step stool with a grab bar.  Keep electrical cords out of the way.  Do not use floor polish or wax that makes floors slippery.  What can I do with my stairs?  Do not leave any items on the stairs.  Make sure that you have a light switch at the top and the bottom of the stairs.  Make sure that there are handrails on both sides of the stairs. Fix handrails that are broken or loose.  Install nonslip stair treads on all your stairs.  Avoid having throw rugs at the top or bottom of the stairs.  Choose a carpet that does not hide the edge of the steps on the stairs.  Check carpeting to make sure that it is firmly attached to the stairs. Fix carpet that is loose or worn.  What can I do on the outside of my home?  Use bright outdoor lighting.  Fix the edges of walkways and driveways and fix any cracks.  Remove anything that might make you trip as you walk through a door, such as a raised step or threshold.  Trim any bushes or trees on paths to your home.  Check to see if handrails are loose or broken and that both sides of all steps have handrails.  Install guardrails along the edges of any raised decks and porches.  Clear paths of anything that can make you trip, such as tools or rocks.  Have leaves, snow, or ice cleared regularly.  Use sand or salt on paths during winter.  Clean up any spills in your garage right away. This includes grease or oil spills.  What other actions can I take?  Wear shoes that:  Have a low heel. Do not wear high heels.  Have rubber bottoms.  Feel good on your feet and fit well.  Are closed at the toe. Do not wear open-toe sandals.  Use tools that help you move around if needed. These include:  Canes.  Walkers.  Scooters.  Crutches.  Review your medicines with your doctor. Some medicines can make you feel dizzy. This can increase your chance of falling.  Ask your doctor what else you can do to help prevent falls.  Where to find more information  Centers  for Disease Control and Prevention, STEADI: www.cdc.gov  National [place] on Aging: www.[place].nih.gov  Contact a doctor if:  You are afraid of falling at home.  You feel weak, drowsy, or dizzy at home.  You fall at home.  Summary  There are many simple things that you can do to make your home safe and to help prevent falls.  Ways to make your home safe include removing things that can make you trip and installing grab bars in the bathroom.  Ask for help when making these changes in your home.  This information is not intended to replace advice given to you by your health care provider. Make sure you discuss any questions you have with your health care provider.  Document Revised: 07/21/2021 Document Reviewed: 07/21/2021  Elsevier Patient Education © 2021 [employer] Inc[employer]

## 2022-06-17 NOTE — PROGRESS NOTES
"Subjective   Jennifer Horne is a 62 y.o. female.       Chief Complaint -syncope    History of Present Illness -    ROS    She is here today with her  Prabhjot Horne who is helping with history.    Syncope-  After eating dinner last night she got up from her chair and walked a few feet when she collapsed.  Her  states that he did catch her easing her to the ground without injury.  Blood pressure was taken approximately 5 minutes after patient's syncopal episode and noted to be 150/105.  These episodes have been occurring intermittently for greater than 6 months.     2/3/2022 tilt table test was unremarkable/negative    2/3/2022 echocardiogram revealed LVEF of 60% with normal systolic function.  Aortic valve mitral valve and pulmonic valve noted to be grossly normal in structure.  Physiologic tricuspid valve regurgitation noted.    2/18/2022 cardiac event monitor was a normal study    September 2019 cardiac catheterization revealed angiographically normal coronary arteries, normal LV systolic function, no AAS, no MR, and elevated LVEDP    12/11/2021 CT of the head was unremarkable    3/8/2022 she was evaluated by cardiologist Dr. Torsten Sandoval.  He diagnosed her with syncope and collapse noting both episodes of orthostatic and postprandial hypotension.  She was found to have low risk cardiac evaluation revealing normal myocardial structure and function with no significant structural heart disease.  Conservative measures were advised    Vitamin D deficiency-stable with vitamin D supplementation    Osteoporosis-stable with calcium and vitamin D supplementation      The following portions of the patient's history were reviewed and updated as appropriate: allergies, current medications, past family history, past medical history, past social history, past surgical history and problem list.    Review of Systems    Objective  Vital signs:  /55   Pulse 77   Temp 97.4 °F (36.3 °C)   Ht 162.6 cm (64.02\")   " Wt 101 kg (222 lb 3.2 oz)   SpO2 95%   BMI 38.12 kg/m²     Physical Exam  Vitals and nursing note reviewed.   Constitutional:       General: She is not in acute distress.     Appearance: Normal appearance. She is well-developed. She is not diaphoretic.   HENT:      Head: Normocephalic and atraumatic.      Right Ear: Tympanic membrane, ear canal and external ear normal.      Left Ear: Tympanic membrane, ear canal and external ear normal.      Nose: Nose normal.      Mouth/Throat:      Mouth: Mucous membranes are moist.      Pharynx: No oropharyngeal exudate or posterior oropharyngeal erythema.   Eyes:      Extraocular Movements: Extraocular movements intact.      Conjunctiva/sclera: Conjunctivae normal.   Neck:      Thyroid: No thyromegaly.   Cardiovascular:      Rate and Rhythm: Normal rate and regular rhythm.      Heart sounds: Normal heart sounds. No murmur heard.  Pulmonary:      Effort: Pulmonary effort is normal. No respiratory distress.      Breath sounds: Normal breath sounds. No wheezing or rales.   Musculoskeletal:         General: No tenderness.      Cervical back: Normal range of motion and neck supple.   Lymphadenopathy:      Cervical: No cervical adenopathy.   Skin:     General: Skin is warm and dry.      Findings: No rash.   Neurological:      Mental Status: She is alert and oriented to person, place, and time.   Psychiatric:         Mood and Affect: Mood normal.         Behavior: Behavior normal.         Thought Content: Thought content normal.         The following data was reviewed by: MAIK An on 06/14/2022:  CMP    CMP 11/19/21 12/11/21 6/14/22   Glucose 93 132 (A) 83   BUN 12 18 11   Creatinine 0.72 0.79 0.71   eGFR Non African Am 83 74    Sodium 139 138 143   Potassium 4.3 3.8 4.3   Chloride 103 104 103   Calcium 9.6 9.1 9.7   Albumin 4.50 3.99 4.50   Total Bilirubin 0.5 0.4 0.4   Alkaline Phosphatase 91 92 86   AST (SGOT) 19 21 27   ALT (SGPT) 17 15 21   (A) Abnormal value        Comments are available for some flowsheets but are not being displayed.           CBC w/diff    CBC w/Diff 12/11/21 6/14/22   WBC 8.44 5.27   RBC 4.37 4.86   Hemoglobin 12.1 13.5   Hematocrit 38.2 42.0   MCV 87.4 86.4   MCH 27.7 27.8   MCHC 31.7 32.1   RDW 13.4 13.0   Platelets 235 233   Neutrophil Rel % 76.5 (A) 62.6   Immature Granulocyte Rel % 0.4 0.2   Lymphocyte Rel % 14.6 (A) 27.3   Monocyte Rel % 7.0 7.2   Eosinophil Rel % 1.1 2.1   Basophil Rel % 0.4 0.6   (A) Abnormal value            Lipid Panel    Lipid Panel 11/19/21 6/14/22   Total Cholesterol 187 185   Triglycerides 118 133   HDL Cholesterol 53 56   VLDL Cholesterol 21 23   LDL Cholesterol  113 (A) 106 (A)   LDL/HDL Ratio 2.08 1.83   (A) Abnormal value            TSH    TSH 11/19/21 6/14/22   TSH 1.330 1.200           Most Recent A1C    HGBA1C Most Recent 6/14/22   Hemoglobin A1C 5.90 (A)   (A) Abnormal value            Data reviewed: Cardiology studies As listed above and Consultant notes As listed above       Assessment & Plan     Diagnoses and all orders for this visit:    1. Syncope, unspecified syncope type (Primary)  Comments:  Refer to neurology to rule out seizure activity  Advised to speak with bariatric surgeon/GI to see if postprandial hypotension related gastric sleeve surgery  Orders:  -     Ambulatory Referral to Neurology  -     Comprehensive Metabolic Panel; Future  -     TSH; Future  -     Hemoglobin A1c; Future  -     CBC & Differential; Future  -     Comprehensive Metabolic Panel  -     TSH  -     Hemoglobin A1c  -     CBC & Differential  -     ECG 12 Lead    2. Vitamin D deficiency  Comments:  Continue vitamin D supplementation  Orders:  -     Vitamin D 25 Hydroxy; Future  -     Vitamin D 25 Hydroxy    3. Senile osteoporosis  -     Vitamin D 25 Hydroxy; Future  -     Vitamin D 25 Hydroxy    4. Screening for hyperlipidemia  -     Lipid Panel; Future  -     Lipid Panel    June 14, 2022 at 9:44 AM  EKG  Performed by: Isabella  JUNE Norman  Interpreted by: Aisha Coffman PA-C  Comparison: Compared with EKG 12/11/2021 with no significant changes noted  Rhythm: Sinus rhythm  Rate: 64 bpm  Conduction: Normal  Axis: Normal  Interpretation: Normal EKG          Patient was given instructions and counseling regarding his condition or for health maintenance advice. Please see specific information pulled into the AVS if appropriate      This document has been electronically signed by:  Aisha Coffman PA-C

## 2022-06-24 ENCOUNTER — TELEPHONE (OUTPATIENT)
Dept: FAMILY MEDICINE CLINIC | Facility: CLINIC | Age: 62
End: 2022-06-24

## 2022-06-24 NOTE — TELEPHONE ENCOUNTER
Sent patient a message via [employer]. Due to not being able to contact via phone number on file.        ----- Message from MAIK An sent at 6/17/2022  7:47 AM EDT -----   Please call the patient and see if she has seen her bariatric surgeon recently that did her gastric sleeve surgery.  Given her recent syncope I do recommend that she speak with her gastric surgeon or I will be glad to refer her to GI specialty to make sure her syncope is not related to the sleeve

## 2022-08-11 ENCOUNTER — OFFICE VISIT (OUTPATIENT)
Dept: NEUROLOGY | Facility: CLINIC | Age: 62
End: 2022-08-11

## 2022-08-11 VITALS
SYSTOLIC BLOOD PRESSURE: 120 MMHG | OXYGEN SATURATION: 98 % | BODY MASS INDEX: 38.93 KG/M2 | WEIGHT: 228 LBS | HEIGHT: 64 IN | HEART RATE: 78 BPM | DIASTOLIC BLOOD PRESSURE: 76 MMHG

## 2022-08-11 DIAGNOSIS — R55 SYNCOPE, UNSPECIFIED SYNCOPE TYPE: Primary | ICD-10-CM

## 2022-08-11 PROCEDURE — 99214 OFFICE O/P EST MOD 30 MIN: CPT | Performed by: NURSE PRACTITIONER

## 2022-08-11 RX ORDER — B-COMPLEX WITH VITAMIN C
TABLET ORAL
COMMUNITY

## 2022-08-11 NOTE — PROGRESS NOTES
Neuro Office Visit      Encounter Date: 2022   Patient Name: Jennifer Horne  : 1960   MRN: [id number]   PCP: MAIK Moraes  Chief Complaint:    Chief Complaint   Patient presents with   • Syncope       History of Present Illness: Jennifer Horne is a 62 y.o. female who is here today in Neurology for syncope.    Syncope  2022 had syncopal episode occurred after eating. No injury as  lowered her to the ground. BP was 150/105  Second episode occurred in the shower. Had a feeling of impending doom. Called for  and he helped her to the stool. Said her head was flexed, she stopped breathing. Called EMS but she regained consciousness.    She wakes up with muscle pain and has difficulity moving. She can't tell if it is her joints or muscles. Better after she starts to walk.    Feels memory is poor after these episodes. Can't recall conversations    Taking GBP 300mg at night for one year to help with rest and for shingles.    Cardiac work up includes:  2/3/22  tilt table test was negative  2/3/22 Echo w LVEH 60%. Tricuspid regurg.  22 Cardiac monitor was nml  2019 [facility] nml arteries, nml LV, No AAS  21 CTH-neg  3/8/22 Seen by Dr Sandoval and syncopal episodes thought to be due to orthostatic postprandial hypotension    Past history of micturation syncope 10-15 years ago x 2. Injured face with nasal fracture.  reports she was jerking. Third episode after a procedure. One episode of nocturnal incontinence.    No FH of seizure disorder.  Denies etoh and drug use. Does not take Wellbutrin.    PMH: vit D def, osteoporosis, S/P gastric sleeve  Subjective      Past Medical History:   Past Medical History:   Diagnosis Date   • Allergic    • Arthritis    • GERD (gastroesophageal reflux disease)    • GERD (gastroesophageal reflux disease)    • PONV (postoperative nausea and vomiting)        Past Surgical History:   Past Surgical History:   Procedure Laterality Date   • BREAST  BIOPSY Right     benign   • CARDIAC CATHETERIZATION N/A 2019    Procedure: Left Heart Cath;  Surgeon: Torsten Sandoval III, MD;  Location: [facility] CATH INVASIVE LOCATION;  Service: Cardiovascular   •  SECTION     • CHOLECYSTECTOMY     • COLONOSCOPY     • GASTRIC BYPASS     • HYSTERECTOMY         Family History:   Family History   Problem Relation Age of Onset   • Cancer Mother    • Hypertension Father    • Hypertension Brother    • Febrile seizures Maternal Aunt    • Breast cancer Neg Hx        Social History:   Social History     Socioeconomic History   • Marital status:      Spouse name: moe   • Number of children: 1   • Years of education: 18   Tobacco Use   • Smoking status: Former Smoker     Packs/day: 1.00     Years: 20.00     Pack years: 20.00     Types: Cigarettes     Quit date: 2007     Years since quittin.8   • Smokeless tobacco: Never Used   Vaping Use   • Vaping Use: Never used   Substance and Sexual Activity   • Alcohol use: No   • Drug use: No   • Sexual activity: Defer       Medications:     Current Outpatient Medications:   •  albuterol sulfate  (90 Base) MCG/ACT inhaler, Inhale 2 puffs Every 6 (Six) Hours As Needed for Wheezing or Shortness of Air., Disp: 18 g, Rfl: 5  •  B Complex Vitamins (Vitamin B Complex) tablet, Take  by mouth., Disp: , Rfl:   •  cholecalciferol (VITAMIN D3) 1000 units tablet, Take 1,000 Units by mouth Every Morning., Disp: , Rfl:   •  Crisaborole (Eucrisa) 2 % ointment, Apply 5 g topically Daily As Needed (eczema)., Disp: 60 g, Rfl: 5  •  Docusate Calcium (STOOL SOFTENER PO), Take 1 tablet by mouth Every Morning., Disp: , Rfl:   •  Ferrous Sulfate (IRON PO), Take 1 tablet by mouth Daily., Disp: , Rfl:   •  fluticasone (FLONASE) 50 MCG/ACT nasal spray, SPRAY TWO SPRAYS IN EACH NOSTRIL ONCE DAILY (Patient taking differently: 2 sprays into the nostril(s) as directed by provider As Needed. SPRAY TWO SPRAYS IN EACH NOSTRIL  ONCE DAILY), Disp: 18.2 mL, Rfl: 5  •  gabapentin (NEURONTIN) 300 MG capsule, TAKE ONE CAPSULE BY MOUTH THREE TIMES A DAY (Patient taking differently: Taking one at night.), Disp: 90 capsule, Rfl: 0  •  levocetirizine (XYZAL) 5 MG tablet, Take 1 tablet by mouth Every Morning., Disp: 90 tablet, Rfl: 3  •  Multiple Vitamins-Minerals (MULTIVITAMIN ADULT PO), Take 1 tablet by mouth Every Morning., Disp: , Rfl:   •  pantoprazole (PROTONIX) 40 MG EC tablet, Take 1 tablet by mouth Daily., Disp: 90 tablet, Rfl: 3  •  tiZANidine (ZANAFLEX) 4 MG tablet, Take 1 tablet by mouth At Night As Needed for Muscle Spasms., Disp: 90 tablet, Rfl: 3  •  vitamin B-12 (CYANOCOBALAMIN) 1000 MCG tablet, Take 1,000 mcg by mouth Every Morning., Disp: , Rfl:   •  vitamin C (ASCORBIC ACID) 500 MG tablet, Take 500 mg by mouth Every Morning., Disp: , Rfl:     Allergies:   Allergies   Allergen Reactions   • Evista [Raloxifene] Shortness Of Breath     Syncope and chest pain   • Compazine [Prochlorperazine] Myalgia     Muscle spasms specifically that resembled seizure activity   • Morphine Nausea And Vomiting and Other (See Comments)     Abdominal pains       PHQ-9 Total Score:     STEADI Fall Risk Assessment has not been completed.    Objective     Physical Exam:   Physical Exam  Eyes:      Pupils: Pupils are equal, round, and reactive to light.   Neurological:      Mental Status: She is oriented to person, place, and time.      Coordination: Finger-Nose-Finger Test, Heel to Shin Test and Romberg Test normal.      Gait: Gait is intact.      Deep Tendon Reflexes:      Reflex Scores:       Tricep reflexes are 2+ on the right side and 2+ on the left side.       Bicep reflexes are 2+ on the right side and 2+ on the left side.       Brachioradialis reflexes are 2+ on the right side and 2+ on the left side.       Patellar reflexes are 2+ on the right side and 2+ on the left side.       Achilles reflexes are 2+ on the right side and 2+ on the left  "side.  Psychiatric:         Speech: Speech normal.         Neurologic Exam     Mental Status   Oriented to person, place, and time.   Follows 3 step commands.   Attention: normal. Concentration: normal.   Speech: speech is normal   Level of consciousness: alert  Knowledge: consistent with education.   Normal comprehension.     Cranial Nerves     CN III, IV, VI   Pupils are equal, round, and reactive to light.  Right pupil: Accommodation: intact.   Left pupil: Accommodation: intact.   CN III: no CN III palsy  CN VI: no CN VI palsy  Nystagmus: none   Diplopia: none  Upgaze: normal  Downgaze: normal  Conjugate gaze: present    CN VII   Facial expression full, symmetric.     CN VIII   Hearing: intact    CN XII   CN XII normal.     Motor Exam   Muscle bulk: normal  Overall muscle tone: normal    Strength   Right biceps: 5/5  Left biceps: 5/5  Right triceps: 5/5  Left triceps: 5/5  Right interossei: 5/5  Left interossei: 5/5  Right quadriceps: 5/5  Left quadriceps: 5/5  Right anterior tibial: 5/5  Left anterior tibial: 5/5  Right posterior tibial: 5/5  Left posterior tibial: 5/5    Sensory Exam   Light touch normal.     Gait, Coordination, and Reflexes     Gait  Gait: normal    Coordination   Romberg: negative  Finger to nose coordination: normal  Heel to shin coordination: normal    Tremor   Resting tremor: absent  Action tremor: absent    Reflexes   Right brachioradialis: 2+  Left brachioradialis: 2+  Right biceps: 2+  Left biceps: 2+  Right triceps: 2+  Left triceps: 2+  Right patellar: 2+  Left patellar: 2+  Right achilles: 2+  Left achilles: 2+  Right : 2+  Left : 2+       Vital Signs:   Vitals:    08/11/22 0849   BP: 120/76   Pulse: 78   SpO2: 98%   Weight: 103 kg (228 lb)   Height: 162.6 cm (64.02\")     Body mass index is 39.12 kg/m².     Results:   Imaging:   No Images in the past 120 days found..     Labs:    No results found for: CMP, PROTEIN, ANTIMOGAB, [id number], JCVRESULT, QUANTTBGOLD, CBCDIF, " IGGALBSER     Assessment / Plan      Assessment/Plan:   Diagnoses and all orders for this visit:    1. Syncope, unspecified syncope type (Primary)  -     MRI Brain With & Without Contrast; Future  -     EEG (Hospital Performed); Future         I have instructed and given the patient education on the importance of not driving and operating heavy machinery. No solo bathing or tub baths for 3 months from onset of the most recent seizure.   Minimize stress as much as possible. I have recommended 7-8 hours of sleep each night. Abstain from alcohol intake. Educated on Antiepileptic medications with possible side effects and signs and symptoms to report if prescribed during visit. Instructed to take seizure medication daily if prescribed. Reviewed potential seizure risk factors.   I have instructed the patient to call 911 or our office if another seizure does occur. Patient verbalizes and understands.       Reviewed medications, potential side effects and signs and symptoms to report. Discussed risk versus benefits of treatment plan with patient and/or family-including medications, labs and radiology that may be ordered. Addressed questions and concerns during visit. Patient and/or family verbalized understanding and agree with plan. Instructed to call the office with any questions and report to ER with any life-threatening symptoms.     Follow Up:     During this visit the following were done:  Labs Reviewed [x]    Labs Ordered []    Radiology Reports Reviewed [x]    Radiology Ordered []    PCP Records Reviewed []    Referring Provider Records Reviewed []    ER Records Reviewed []    Hospital Records Reviewed []    History Obtained From Family []    Radiology Images Reviewed []    Other Reviewed [x]    Records Requested []      Arelis Clarke, SERA, APRN

## 2022-08-12 DIAGNOSIS — B02.23 POST-HERPETIC POLYNEUROPATHY: ICD-10-CM

## 2022-08-12 RX ORDER — GABAPENTIN 300 MG/1
CAPSULE ORAL
Qty: 90 CAPSULE | Refills: 0 | Status: SHIPPED | OUTPATIENT
Start: 2022-08-12 | End: 2022-09-02 | Stop reason: SDUPTHER

## 2022-09-02 ENCOUNTER — OFFICE VISIT (OUTPATIENT)
Dept: FAMILY MEDICINE CLINIC | Facility: CLINIC | Age: 62
End: 2022-09-02

## 2022-09-02 VITALS — HEIGHT: 64 IN | BODY MASS INDEX: 38.93 KG/M2 | WEIGHT: 228 LBS | HEART RATE: 76 BPM | OXYGEN SATURATION: 98 %

## 2022-09-02 DIAGNOSIS — M62.838 MUSCLE SPASM OF BOTH LOWER LEGS: Chronic | ICD-10-CM

## 2022-09-02 DIAGNOSIS — J30.89 CHRONIC NONSEASONAL ALLERGIC RHINITIS DUE TO POLLEN: Chronic | ICD-10-CM

## 2022-09-02 DIAGNOSIS — J30.89 CHRONIC NONSEASONAL ALLERGIC RHINITIS DUE TO FUNGAL SPORES: Primary | ICD-10-CM

## 2022-09-02 DIAGNOSIS — B02.23 POST-HERPETIC POLYNEUROPATHY: ICD-10-CM

## 2022-09-02 DIAGNOSIS — K21.9 GERD WITHOUT ESOPHAGITIS: ICD-10-CM

## 2022-09-02 DIAGNOSIS — B02.23 POST-HERPETIC POLYNEUROPATHY: Chronic | ICD-10-CM

## 2022-09-02 DIAGNOSIS — R55 SYNCOPE AND COLLAPSE: Chronic | ICD-10-CM

## 2022-09-02 PROCEDURE — 99214 OFFICE O/P EST MOD 30 MIN: CPT | Performed by: PHYSICIAN ASSISTANT

## 2022-09-02 RX ORDER — PANTOPRAZOLE SODIUM 40 MG/1
40 TABLET, DELAYED RELEASE ORAL DAILY
Qty: 90 TABLET | Refills: 3 | Status: SHIPPED | OUTPATIENT
Start: 2022-09-02 | End: 2022-12-02 | Stop reason: SDUPTHER

## 2022-09-02 RX ORDER — LEVOCETIRIZINE DIHYDROCHLORIDE 5 MG/1
5 TABLET, FILM COATED ORAL EVERY MORNING
Qty: 90 TABLET | Refills: 3 | Status: SHIPPED | OUTPATIENT
Start: 2022-09-02

## 2022-09-02 RX ORDER — TIZANIDINE 4 MG/1
4 TABLET ORAL NIGHTLY PRN
Qty: 90 TABLET | Refills: 3 | Status: SHIPPED | OUTPATIENT
Start: 2022-09-02

## 2022-09-02 RX ORDER — GABAPENTIN 300 MG/1
300 CAPSULE ORAL 3 TIMES DAILY
Qty: 90 CAPSULE | Refills: 2 | Status: SHIPPED | OUTPATIENT
Start: 2022-09-02 | End: 2023-03-09

## 2022-09-02 RX ORDER — FLUTICASONE PROPIONATE 50 MCG
SPRAY, SUSPENSION (ML) NASAL
Qty: 18.2 ML | Refills: 5 | Status: SHIPPED | OUTPATIENT
Start: 2022-09-02

## 2022-09-05 NOTE — PROGRESS NOTES
"Subjective   Jennifer Horne is a 62 y.o. female.     Video Visit    Patient presents during the COVID-19 pandemic/newly declared state of public health emergency.  This service was conducted via video including audio/visual technology through the use of [employer].  The patient lives with her  who is immunosuppressed which necessitates this telehealth service.  The patient is located at her home with her  Prabhjot and I am located at [facility].  Prabhjot Horne, the patient's  is present during the video visit today.    You have chosen to receive care through a telehealth visit.  Do you consent to use a video/audio connection for your medical care today? Yes    Chief Complaint -   allergic rhinitis    History of Present Illness -    ROS    Allergic rhinitis-stable with Flonase and Xyzal    Postherpetic polyneuropathy-  Stable with gabapentin use    Gastroesophageal reflux disease-stable with pantoprazole and dietary modification    Muscle spasms in legs-stable with tizanidine    Syncope-  Patient reports no new syncopal episodes in the last month.  She has been seen by neurology and states that she is scheduled for an EEG and MRI of the brain for further evaluation.      The following portions of the patient's history were reviewed and updated as appropriate: allergies, current medications, past family history, past medical history, past social history, past surgical history and problem list.    Review of Systems    Objective  Vital signs:  Pulse 76   Ht 162.6 cm (64.02\")   Wt 103 kg (228 lb)   SpO2 98%   BMI 39.11 kg/m²     Physical Exam  Vitals and nursing note reviewed.   Constitutional:       Appearance: Normal appearance.   HENT:      Head: Normocephalic and atraumatic.   Eyes:      Extraocular Movements: Extraocular movements intact.      Conjunctiva/sclera: Conjunctivae normal.   Pulmonary:      Effort: Pulmonary effort is normal.      Breath " sounds: Normal breath sounds. No wheezing.      Comments: Breath sounds appear normal without use of accessory muscles or wheezing appreciated today  Musculoskeletal:      Cervical back: Normal range of motion and neck supple.   Skin:     General: Skin is dry.      Findings: No erythema or rash.   Neurological:      General: No focal deficit present.      Mental Status: She is alert and oriented to person, place, and time.   Psychiatric:         Mood and Affect: Mood normal.         Thought Content: Thought content normal.         The following data was reviewed by: MAIK An on 09/02/2022:  CMP    CMP 11/19/21 12/11/21 6/14/22   Glucose 93 132 (A) 83   BUN 12 18 11   Creatinine 0.72 0.79 0.71   eGFR Non African Am 83 74    Sodium 139 138 143   Potassium 4.3 3.8 4.3   Chloride 103 104 103   Calcium 9.6 9.1 9.7   Albumin 4.50 3.99 4.50   Total Bilirubin 0.5 0.4 0.4   Alkaline Phosphatase 91 92 86   AST (SGOT) 19 21 27   ALT (SGPT) 17 15 21   (A) Abnormal value       Comments are available for some flowsheets but are not being displayed.           CBC w/diff    CBC w/Diff 12/11/21 6/14/22   WBC 8.44 5.27   RBC 4.37 4.86   Hemoglobin 12.1 13.5   Hematocrit 38.2 42.0   MCV 87.4 86.4   MCH 27.7 27.8   MCHC 31.7 32.1   RDW 13.4 13.0   Platelets 235 233   Neutrophil Rel % 76.5 (A) 62.6   Immature Granulocyte Rel % 0.4 0.2   Lymphocyte Rel % 14.6 (A) 27.3   Monocyte Rel % 7.0 7.2   Eosinophil Rel % 1.1 2.1   Basophil Rel % 0.4 0.6   (A) Abnormal value            Lipid Panel    Lipid Panel 11/19/21 6/14/22   Total Cholesterol 187 185   Triglycerides 118 133   HDL Cholesterol 53 56   VLDL Cholesterol 21 23   LDL Cholesterol  113 (A) 106 (A)   LDL/HDL Ratio 2.08 1.83   (A) Abnormal value            TSH    TSH 11/19/21 6/14/22   TSH 1.330 1.200                  Assessment & Plan     Diagnoses and all orders for this visit:    1. Chronic nonseasonal allergic rhinitis due to fungal spores  (Primary)  Comments:  Continue Flonase  Advised to avoid allergen triggers when possible  Orders:  -     fluticasone (FLONASE) 50 MCG/ACT nasal spray; SPRAY TWO SPRAYS IN EACH NOSTRIL ONCE DAILY  Dispense: 18.2 mL; Refill: 5    2. Post-herpetic polyneuropathy  Comments:  Continue gabapentin    3. Chronic nonseasonal allergic rhinitis due to pollen  Comments:  Continue Xyzal and Flonase  Advised to avoid known environmental allergens when possible  Orders:  -     levocetirizine (XYZAL) 5 MG tablet; Take 1 tablet by mouth Every Morning.  Dispense: 90 tablet; Refill: 3    4. GERD without esophagitis  Comments:  Continue Protonix  Orders:  -     pantoprazole (PROTONIX) 40 MG EC tablet; Take 1 tablet by mouth Daily.  Dispense: 90 tablet; Refill: 3    5. Muscle spasm of both lower legs  Comments:  Continue tizanidine  Orders:  -     tiZANidine (ZANAFLEX) 4 MG tablet; Take 1 tablet by mouth At Night As Needed for Muscle Spasms.  Dispense: 90 tablet; Refill: 3    6. Syncope and collapse  Comments:  Advised to continue with neurology and scheduled EEG and MRI of brain            Patient was given instructions and counseling regarding his condition or for health maintenance advice. Please see specific information pulled into the AVS if appropriate      This document has been electronically signed by:  Aisha Coffman PA-C

## 2022-09-29 ENCOUNTER — TELEPHONE (OUTPATIENT)
Dept: NEUROLOGY | Facility: CLINIC | Age: 62
End: 2022-09-29

## 2022-09-29 ENCOUNTER — APPOINTMENT (OUTPATIENT)
Dept: MRI IMAGING | Facility: HOSPITAL | Age: 62
End: 2022-09-29

## 2022-09-29 ENCOUNTER — APPOINTMENT (OUTPATIENT)
Dept: NEUROLOGY | Facility: HOSPITAL | Age: 62
End: 2022-09-29

## 2022-09-29 DIAGNOSIS — R55 SYNCOPE, UNSPECIFIED SYNCOPE TYPE: Primary | ICD-10-CM

## 2022-09-29 NOTE — TELEPHONE ENCOUNTER
"  Caller: FRANCESCO  Relationship to Patient: SELF  Phone Number: [phone]    Reason for Call: PT'S DAD HAD AN ACCIDENT AND HAS TO HAVE A SURGERY, SHE HAD TO CX HER MRI AND EEG , SHE STATES CENTRAL SCHEDULING ADVISED BY THE TIME THEY CAN RESCHEDULE THAT THE MRI REFERRAL WILL BE , THEY STATE THEY NEED A NEW ORDER. SHE ALSO WANTS TO MENTION IN THE PAST WHEN GETTING A CT THAT THE CONTRAST MADE HER ITCHY SO SHE IS WONDERING IF SHE CAN HAVE SOMETHING FOR THE \"ITCH\"   "

## 2022-09-30 NOTE — TELEPHONE ENCOUNTER
Called Jennifer and spoke with her regarding itching per provider and she stated that when she had the procedure they noticed she had a small rash but patient didn't even notice and they told her she might take some Benadryl and I told her that would be fine since she did not have a major reaction all over her body.  Patient asked about making a follow up appointment.  I told patient to keep the appointment she has on 10/18 and that she should call when she gets her appointment scheduled for her procedure.  Patient was understanding and appreciative.

## 2022-10-14 ENCOUNTER — TELEPHONE (OUTPATIENT)
Dept: NEUROLOGY | Facility: CLINIC | Age: 62
End: 2022-10-14

## 2022-10-14 NOTE — TELEPHONE ENCOUNTER
Provider: TAMAR  Caller: PATIENT  Relationship to Patient: SELF  Pharmacy: N/A  Phone Number: [phone]  Reason for Call: PATIENT CHANGED HER UPCOMING APPOINTMENT FROM 10/18 TO 12/22. ORIGINAL APPT WAS A MY CHART VIDEO VISIT.    CAN THE 12/22 APPT BE CHANGED?    PLEASE ADVISE.    THANK YOU.

## 2022-10-17 NOTE — TELEPHONE ENCOUNTER
Called patient and gave her provider's note okay to keep December appointment.  Patient was understanding.

## 2022-11-11 ENCOUNTER — HOSPITAL ENCOUNTER (OUTPATIENT)
Dept: MRI IMAGING | Facility: HOSPITAL | Age: 62
Discharge: HOME | End: 2022-11-11

## 2022-11-11 ENCOUNTER — HOSPITAL ENCOUNTER (OUTPATIENT)
Dept: NEUROLOGY | Facility: HOSPITAL | Age: 62
Discharge: HOME | End: 2022-11-11

## 2022-11-11 ENCOUNTER — TELEPHONE (OUTPATIENT)
Dept: NEUROLOGY | Facility: CLINIC | Age: 62
End: 2022-11-11

## 2022-11-11 DIAGNOSIS — R55 SYNCOPE, UNSPECIFIED SYNCOPE TYPE: ICD-10-CM

## 2022-11-11 PROCEDURE — 0 GADOBENATE DIMEGLUMINE 529 MG/ML SOLUTION: Performed by: NURSE PRACTITIONER

## 2022-11-11 PROCEDURE — A9577 INJ MULTIHANCE: HCPCS | Performed by: NURSE PRACTITIONER

## 2022-11-11 PROCEDURE — 70553 MRI BRAIN STEM W/O & W/DYE: CPT

## 2022-11-11 PROCEDURE — 95819 EEG AWAKE AND ASLEEP: CPT

## 2022-11-11 PROCEDURE — 82565 ASSAY OF CREATININE: CPT

## 2022-11-11 RX ADMIN — GADOBENATE DIMEGLUMINE 20 ML: 529 INJECTION, SOLUTION INTRAVENOUS at 08:38

## 2022-11-11 NOTE — TELEPHONE ENCOUNTER
Called patient and gave results.  Patient was understanding and appreciative.    ----- Message from Arelis Clarke DNP, APRN sent at 11/11/2022  2:57 PM EST -----  Please notify pt EEG showed no signs of epilepsy.

## 2022-11-11 NOTE — TELEPHONE ENCOUNTER
Called patient and gave results.  She was understanding and appreciative.    ----- Message from Arelis Clarke DNP, APRN sent at 11/11/2022  2:59 PM EST -----  Please notify pt MRI of brains showed no concerning findings

## 2022-11-21 ENCOUNTER — HOSPITAL ENCOUNTER (OUTPATIENT)
Dept: MAMMOGRAPHY | Facility: HOSPITAL | Age: 62
Discharge: HOME | End: 2022-11-21
Admitting: PHYSICIAN ASSISTANT

## 2022-11-21 DIAGNOSIS — Z12.31 VISIT FOR SCREENING MAMMOGRAM: ICD-10-CM

## 2022-11-21 PROCEDURE — 77063 BREAST TOMOSYNTHESIS BI: CPT

## 2022-11-21 PROCEDURE — 77067 SCR MAMMO BI INCL CAD: CPT

## 2022-11-21 PROCEDURE — 77067 SCR MAMMO BI INCL CAD: CPT | Performed by: RADIOLOGY

## 2022-11-21 PROCEDURE — 77063 BREAST TOMOSYNTHESIS BI: CPT | Performed by: RADIOLOGY

## 2022-12-02 ENCOUNTER — OFFICE VISIT (OUTPATIENT)
Dept: FAMILY MEDICINE CLINIC | Facility: CLINIC | Age: 62
End: 2022-12-02

## 2022-12-02 VITALS — BODY MASS INDEX: 38.41 KG/M2 | OXYGEN SATURATION: 98 % | WEIGHT: 225 LBS | HEIGHT: 64 IN | TEMPERATURE: 98.3 F

## 2022-12-02 DIAGNOSIS — B02.23 POST-HERPETIC POLYNEUROPATHY: Chronic | ICD-10-CM

## 2022-12-02 DIAGNOSIS — R73.03 PREDIABETES: Chronic | ICD-10-CM

## 2022-12-02 DIAGNOSIS — K21.9 GERD WITHOUT ESOPHAGITIS: Primary | ICD-10-CM

## 2022-12-02 DIAGNOSIS — E55.9 VITAMIN D DEFICIENCY: Chronic | ICD-10-CM

## 2022-12-02 DIAGNOSIS — Z13.220 SCREENING FOR HYPERLIPIDEMIA: ICD-10-CM

## 2022-12-02 PROCEDURE — 99214 OFFICE O/P EST MOD 30 MIN: CPT | Performed by: PHYSICIAN ASSISTANT

## 2022-12-02 RX ORDER — DEXTROMETHORPHAN HYDROBROMIDE AND PROMETHAZINE HYDROCHLORIDE 15; 6.25 MG/5ML; MG/5ML
SYRUP ORAL
COMMUNITY
Start: 2022-11-23 | End: 2022-12-02

## 2022-12-02 RX ORDER — PANTOPRAZOLE SODIUM 40 MG/1
40 TABLET, DELAYED RELEASE ORAL DAILY
Qty: 90 TABLET | Refills: 3 | Status: SHIPPED | OUTPATIENT
Start: 2022-12-02

## 2022-12-02 NOTE — PROGRESS NOTES
"Alesia oHrne is a 62 y.o. female.     Video Telehealth Visit    Patient presents during the COVID-19 pandemic/newly declared state of public health emergency.  This service was conducted via video including audio/visual technology through the use of [employer].    The patient has high risk comorbidity which necessitates this telehealth service.    The patient is located at their home.  I am located at the [facility] office.  Other participants in this video visit include patient and her  [place]    You have chosen to receive care through a telehealth visit.  Do you consent to use a video/audio connection for your medical care today? Yes      Chief Complaint -heartburn    History of Present Illness -    ROS    Heartburn-  Heartburn due to chronic gastroesophageal reflux disease is stable with the use of pantoprazole.    Postherpetic neuropathy-  Controlled with gabapentin.  Patient states that she is only taking the gabapentin at night but does have the daytime doses if needed.    Prediabetes-  Stable with dietary modification/low-carb diet.  Patient is status post bariatric surgery.    Vitamin D deficiency-stable with vitamin D supplementation    The following portions of the patient's history were reviewed and updated as appropriate: allergies, current medications, past family history, past medical history, past social history, past surgical history and problem list.    Review of Systems    Objective  Vital signs:  Temp 98.3 °F (36.8 °C) (Temporal)   Ht 162.6 cm (64.02\")   Wt 102 kg (225 lb) Comment: patient reported  SpO2 98%   BMI 38.60 kg/m²     Physical Exam  Vitals and nursing note reviewed.   Constitutional:       Appearance: Normal appearance.   HENT:      Head: Normocephalic and atraumatic.   Eyes:      Extraocular Movements: Extraocular movements intact.      Conjunctiva/sclera: Conjunctivae normal.   Pulmonary:      Effort: Pulmonary effort is " normal.      Breath sounds: Normal breath sounds. No wheezing.      Comments: Breath sounds appear normal without use of accessory muscles or wheezing appreciated today  Musculoskeletal:      Cervical back: Normal range of motion and neck supple.   Skin:     General: Skin is dry.      Findings: No erythema or rash.   Neurological:      General: No focal deficit present.      Mental Status: She is alert and oriented to person, place, and time.   Psychiatric:         Mood and Affect: Mood normal.         Thought Content: Thought content normal.         The following data was reviewed by: MAIK An on 12/02/2022:  CMP    CMP 12/11/21 6/14/22   Glucose 132 (A) 83   BUN 18 11   Creatinine 0.79 0.71   eGFR Non African Am 74    Sodium 138 143   Potassium 3.8 4.3   Chloride 104 103   Calcium 9.1 9.7   Albumin 3.99 4.50   Total Bilirubin 0.4 0.4   Alkaline Phosphatase 92 86   AST (SGOT) 21 27   ALT (SGPT) 15 21   (A) Abnormal value       Comments are available for some flowsheets but are not being displayed.           CBC w/diff    CBC w/Diff 12/11/21 6/14/22   WBC 8.44 5.27   RBC 4.37 4.86   Hemoglobin 12.1 13.5   Hematocrit 38.2 42.0   MCV 87.4 86.4   MCH 27.7 27.8   MCHC 31.7 32.1   RDW 13.4 13.0   Platelets 235 233   Neutrophil Rel % 76.5 (A) 62.6   Immature Granulocyte Rel % 0.4 0.2   Lymphocyte Rel % 14.6 (A) 27.3   Monocyte Rel % 7.0 7.2   Eosinophil Rel % 1.1 2.1   Basophil Rel % 0.4 0.6   (A) Abnormal value            Lipid Panel    Lipid Panel 6/14/22   Total Cholesterol 185   Triglycerides 133   HDL Cholesterol 56   VLDL Cholesterol 23   LDL Cholesterol  106 (A)   LDL/HDL Ratio 1.83   (A) Abnormal value            TSH    TSH 6/14/22   TSH 1.200           Most Recent A1C    HGBA1C Most Recent 6/14/22   Hemoglobin A1C 5.90 (A)   (A) Abnormal value                   Assessment & Plan     Diagnoses and all orders for this visit:    1. GERD without esophagitis (Primary)  Comments:  Continue  Protonix  Orders:  -     pantoprazole (PROTONIX) 40 MG EC tablet; Take 1 tablet by mouth Daily.  Dispense: 90 tablet; Refill: 3  -     Comprehensive Metabolic Panel; Future    2. Post-herpetic polyneuropathy  Comments:  Continue gabapentin as needed  Orders:  -     Comprehensive Metabolic Panel; Future    3. Prediabetes  Comments:  Advise low carbohydrate diet  Orders:  -     Hemoglobin A1c; Future    4. Vitamin D deficiency  Comments:  Continue vitamin D supplementation  Orders:  -     Vitamin D,25-Hydroxy; Future    5. Screening for hyperlipidemia  -     Lipid Panel; Future            Patient was given instructions and counseling regarding his condition or for health maintenance advice. Please see specific information pulled into the AVS if appropriate      This document has been electronically signed by:  Aisha Coffman PA-C

## 2022-12-10 LAB — CREAT BLDA-MCNC: 0.7 MG/DL (ref 0.6–1.3)

## 2022-12-22 ENCOUNTER — TELEMEDICINE (OUTPATIENT)
Dept: NEUROLOGY | Facility: CLINIC | Age: 62
End: 2022-12-22

## 2022-12-22 DIAGNOSIS — R55 SYNCOPE, UNSPECIFIED SYNCOPE TYPE: Primary | ICD-10-CM

## 2022-12-22 PROCEDURE — 99213 OFFICE O/P EST LOW 20 MIN: CPT | Performed by: NURSE PRACTITIONER

## 2022-12-22 NOTE — PROGRESS NOTES
Neuro Office Visit      Encounter Date: 2022   Patient Name: Jennifer Horne  : 1960   MRN: [id number]   PCP: MAIK Moraes  Chief Complaint:    Chief Complaint   Patient presents with   • Syncope       History of Present Illness: Jennifer Horne is a 62 y.o. female who is here today in Neurology for syncope.    You have chosen to receive care through a telehealth visit.  Do you consent to use a video/audio connection for your medical care today? Yes    Last visit 2022 w me-MRI, eeg  MRI Brain With & Without Contrast (2022 08:40)-nml  EEG (2022 12:00)-nml    Syncope  No further episodes. Trying to drink enough water but doesn't always get 64 ounces. Not missing meals. Trying to change positions slowly. Under increased stress as  in hospitalized w a neutropenic fever.    PH  2022 had syncopal episode occurred after eating. No injury as  lowered her to the ground. BP was 150/105  Second episode occurred in the shower. Had a feeling of impending doom. Called for  and he helped her to the stool. Said her head was flexed, she stopped breathing. Called EMS but she regained consciousness.     She wakes up with muscle pain and has difficulity moving. She can't tell if it is her joints or muscles. Better after she starts to walk.     Feels memory is poor after these episodes. Can't recall conversations     Taking GBP 300mg at night for one year to help with rest and for shingles.     Cardiac work up includes:  2/3/22  tilt table test was negative  2/3/22 Echo w LVEH 60%. Tricuspid regurg.  22 Cardiac monitor was nml  2019 [facility] nml arteries, nml LV, No AAS  21 CTH-neg  3/8/22 Seen by Dr Sandoval and syncopal episodes thought to be due to orthostatic postprandial hypotension     Past history of micturation syncope 10-15 years ago x 2. Injured face with nasal fracture.  reports she was jerking. Third episode after a procedure. One episode of  nocturnal incontinence.     No FH of seizure disorder.  Denies etoh and drug use. Does not take Wellbutrin.     PMH: vit D def, osteoporosis, S/P gastric sleeve          Subjective      Past Medical History:   Past Medical History:   Diagnosis Date   • Allergic    • Arthritis    • GERD (gastroesophageal reflux disease)    • GERD (gastroesophageal reflux disease)    • PONV (postoperative nausea and vomiting)        Past Surgical History:   Past Surgical History:   Procedure Laterality Date   • BREAST BIOPSY Right     benign   • CARDIAC CATHETERIZATION N/A 2019    Procedure: Left Heart Cath;  Surgeon: Torsten Sandoval III, MD;  Location: [facility] INVASIVE LOCATION;  Service: Cardiovascular   •  SECTION     • CHOLECYSTECTOMY     • COLONOSCOPY     • GASTRIC BYPASS     • HYSTERECTOMY         Family History:   Family History   Problem Relation Age of Onset   • Cancer Mother    • Hypertension Father    • Hypertension Brother    • Febrile seizures Maternal Aunt    • Breast cancer Neg Hx        Social History:   Social History     Socioeconomic History   • Marital status:      Spouse name: moe   • Number of children: 1   • Years of education: 18   Tobacco Use   • Smoking status: Former     Packs/day: 1.00     Years: 20.00     Pack years: 20.00     Types: Cigarettes     Quit date: 2007     Years since quitting: 15.2   • Smokeless tobacco: Never   Vaping Use   • Vaping Use: Never used   Substance and Sexual Activity   • Alcohol use: No   • Drug use: No   • Sexual activity: Defer       Medications:     Current Outpatient Medications:   •  albuterol sulfate  (90 Base) MCG/ACT inhaler, Inhale 2 puffs Every 6 (Six) Hours As Needed for Wheezing or Shortness of Air., Disp: 18 g, Rfl: 5  •  B Complex Vitamins (Vitamin B Complex) tablet, Take  by mouth., Disp: , Rfl:   •  cholecalciferol (VITAMIN D3) 1000 units tablet, Take 1,000 Units by mouth Every Morning., Disp: , Rfl:   •   Crisaborole (Eucrisa) 2 % ointment, Apply 5 g topically Daily As Needed (eczema)., Disp: 60 g, Rfl: 5  •  Docusate Calcium (STOOL SOFTENER PO), Take 1 tablet by mouth Every Morning., Disp: , Rfl:   •  Ferrous Sulfate (IRON PO), Take 1 tablet by mouth Daily., Disp: , Rfl:   •  fluticasone (FLONASE) 50 MCG/ACT nasal spray, SPRAY TWO SPRAYS IN EACH NOSTRIL ONCE DAILY, Disp: 18.2 mL, Rfl: 5  •  gabapentin (NEURONTIN) 300 MG capsule, Take 1 capsule by mouth 3 (Three) Times a Day., Disp: 90 capsule, Rfl: 2  •  levocetirizine (XYZAL) 5 MG tablet, Take 1 tablet by mouth Every Morning., Disp: 90 tablet, Rfl: 3  •  Multiple Vitamins-Minerals (MULTIVITAMIN ADULT PO), Take 1 tablet by mouth Every Morning., Disp: , Rfl:   •  pantoprazole (PROTONIX) 40 MG EC tablet, Take 1 tablet by mouth Daily., Disp: 90 tablet, Rfl: 3  •  tiZANidine (ZANAFLEX) 4 MG tablet, Take 1 tablet by mouth At Night As Needed for Muscle Spasms., Disp: 90 tablet, Rfl: 3  •  vitamin B-12 (CYANOCOBALAMIN) 1000 MCG tablet, Take 1,000 mcg by mouth Every Morning., Disp: , Rfl:   •  vitamin C (ASCORBIC ACID) 500 MG tablet, Take 500 mg by mouth Every Morning., Disp: , Rfl:     Allergies:   Allergies   Allergen Reactions   • Evista [Raloxifene] Shortness Of Breath     Syncope and chest pain   • Compazine [Prochlorperazine] Myalgia     Muscle spasms specifically that resembled seizure activity   • Morphine Nausea And Vomiting and Other (See Comments)     Abdominal pains       PHQ-9 Total Score:     STEADI Fall Risk Assessment has not been completed.    Objective     Physical Exam:   Physical Exam  Constitutional:       General: She is not in acute distress.     Appearance: Normal appearance. She is not ill-appearing or toxic-appearing.   HENT:      Head: Normocephalic.      Nose: Nose normal.   Eyes:      Conjunctiva/sclera: Conjunctivae normal.   Pulmonary:      Effort: Pulmonary effort is normal. No respiratory distress.   Neurological:      General: No focal  deficit present.      Mental Status: She is alert and oriented to person, place, and time. Mental status is at baseline.   Psychiatric:         Mood and Affect: Mood normal.         Behavior: Behavior normal.         Thought Content: Thought content normal.         Judgment: Judgment normal.         Neurologic Exam     Mental Status   Oriented to person, place, and time.        Vital Signs: There were no vitals filed for this visit.  There is no height or weight on file to calculate BMI.     Results:   Imaging:   EEG    Result Date: 11/11/2022  Normal study This report is transcribed using the Dragon dictation system.      MRI Brain With & Without Contrast    Result Date: 11/11/2022  Essentially normal contrast-enhanced MRI of the brain. There is no evidence of acute ischemia, hemorrhage, mass or abnormal enhancement. Dedicated evaluation of the mesial temporal lobe structures demonstrates symmetric hippocampi, without evidence of atrophy or asymmetric signal abnormality.  This report was finalized on 11/11/2022 9:52 AM by Shaji Smith.            Assessment / Plan      Assessment/Plan:   Diagnoses and all orders for this visit:    1. Syncope, unspecified syncope type (Primary)  Comments:  Likely due to orthostatic hypotension         Patient Education:     Reviewed medications, potential side effects and signs and symptoms to report. Discussed risk versus benefits of treatment plan with patient and/or family-including medications, labs and radiology that may be ordered. Addressed questions and concerns during visit. Patient and/or family verbalized understanding and agree with plan. Instructed to call the office with any questions and report to ER with any life-threatening symptoms.     Follow Up:   PRN  During this visit the following were done:  Labs Reviewed []    Labs Ordered []    Radiology Reports Reviewed [x]    Radiology Ordered []    PCP Records Reviewed []    Referring Provider Records Reviewed []     ER Records Reviewed []    Hospital Records Reviewed []    History Obtained From Family []    Radiology Images Reviewed [x]    Other Reviewed [x]    Records Requested []      Arelis Clarke, SERA, APRN

## 2023-03-09 DIAGNOSIS — B02.23 POST-HERPETIC POLYNEUROPATHY: ICD-10-CM

## 2023-03-09 RX ORDER — GABAPENTIN 300 MG/1
CAPSULE ORAL
Qty: 90 CAPSULE | Refills: 1 | Status: SHIPPED | OUTPATIENT
Start: 2023-03-09

## 2023-08-14 ENCOUNTER — OFFICE VISIT (OUTPATIENT)
Dept: FAMILY MEDICINE CLINIC | Facility: CLINIC | Age: 63
End: 2023-08-14
Payer: COMMERCIAL

## 2023-08-14 VITALS
DIASTOLIC BLOOD PRESSURE: 76 MMHG | OXYGEN SATURATION: 98 % | WEIGHT: 213 LBS | BODY MASS INDEX: 36.37 KG/M2 | TEMPERATURE: 97.9 F | HEIGHT: 64 IN | SYSTOLIC BLOOD PRESSURE: 120 MMHG | HEART RATE: 71 BPM

## 2023-08-14 DIAGNOSIS — K21.9 GERD WITHOUT ESOPHAGITIS: Chronic | ICD-10-CM

## 2023-08-14 DIAGNOSIS — M62.838 MUSCLE SPASM OF BOTH LOWER LEGS: Chronic | ICD-10-CM

## 2023-08-14 DIAGNOSIS — Z13.220 SCREENING FOR HYPERLIPIDEMIA: ICD-10-CM

## 2023-08-14 DIAGNOSIS — R09.81 NASAL CONGESTION: Primary | ICD-10-CM

## 2023-08-14 DIAGNOSIS — R73.03 PREDIABETES: Chronic | ICD-10-CM

## 2023-08-14 DIAGNOSIS — B02.23 POST-HERPETIC POLYNEUROPATHY: ICD-10-CM

## 2023-08-14 DIAGNOSIS — E55.9 VITAMIN D DEFICIENCY: Chronic | ICD-10-CM

## 2023-08-14 DIAGNOSIS — J30.89 CHRONIC NONSEASONAL ALLERGIC RHINITIS DUE TO POLLEN: Chronic | ICD-10-CM

## 2023-08-14 DIAGNOSIS — B02.23 POST-HERPETIC POLYNEUROPATHY: Chronic | ICD-10-CM

## 2023-08-14 PROCEDURE — 82306 VITAMIN D 25 HYDROXY: CPT | Performed by: PHYSICIAN ASSISTANT

## 2023-08-14 PROCEDURE — 80061 LIPID PANEL: CPT | Performed by: PHYSICIAN ASSISTANT

## 2023-08-14 PROCEDURE — 99214 OFFICE O/P EST MOD 30 MIN: CPT | Performed by: PHYSICIAN ASSISTANT

## 2023-08-14 PROCEDURE — 80053 COMPREHEN METABOLIC PANEL: CPT | Performed by: PHYSICIAN ASSISTANT

## 2023-08-14 PROCEDURE — 83036 HEMOGLOBIN GLYCOSYLATED A1C: CPT | Performed by: PHYSICIAN ASSISTANT

## 2023-08-14 RX ORDER — TIZANIDINE 4 MG/1
4 TABLET ORAL NIGHTLY PRN
Qty: 90 TABLET | Refills: 3 | Status: SHIPPED | OUTPATIENT
Start: 2023-08-14

## 2023-08-14 RX ORDER — GABAPENTIN 300 MG/1
300 CAPSULE ORAL 3 TIMES DAILY
Qty: 90 CAPSULE | Refills: 1 | Status: SHIPPED | OUTPATIENT
Start: 2023-08-14

## 2023-08-14 RX ORDER — LEVOCETIRIZINE DIHYDROCHLORIDE 5 MG/1
5 TABLET, FILM COATED ORAL EVERY MORNING
Qty: 90 TABLET | Refills: 3 | Status: SHIPPED | OUTPATIENT
Start: 2023-08-14

## 2023-08-14 RX ORDER — PSEUDOEPHEDRINE HCL 120 MG/1
120 TABLET, FILM COATED, EXTENDED RELEASE ORAL EVERY 12 HOURS
Qty: 30 TABLET | Refills: 0 | Status: SHIPPED | OUTPATIENT
Start: 2023-08-14

## 2023-08-14 RX ORDER — PANTOPRAZOLE SODIUM 40 MG/1
40 TABLET, DELAYED RELEASE ORAL DAILY
Qty: 90 TABLET | Refills: 3 | Status: SHIPPED | OUTPATIENT
Start: 2023-08-14

## 2023-08-14 NOTE — PROGRESS NOTES
"Subjective   Jennifer Horne is a 63 y.o. female.       Chief Complaint -nasal congestion    History of Present Illness -       Nasal congestion-  Patient complains of nasal congestion that is worse with seasonal allergens.  Minimal relief with Xyzal.    Postherpetic neuropathy-  Stable with gabapentin    Allergic rhinitis-not at goal with Xyzal and Flonase due to congestion    Gastroesophageal reflux disease-stable with pantoprazole and dietary modification    Muscle spasms-  Stable with tizanidine use as needed    Prediabetes-  Stable but this is due to be rechecked with lab work today    Vitamin D deficiency-stable with vitamin D supplementation    The following portions of the patient's history were reviewed and updated as appropriate: allergies, current medications, past family history, past medical history, past social history, past surgical history and problem list.        Objective  Vital signs:  /76   Pulse 71   Temp 97.9 øF (36.6 øC) (Temporal)   Ht 162.6 cm (64\")   Wt 96.6 kg (213 lb)   SpO2 98%   BMI 36.56 kg/mý     Physical Exam  Vitals and nursing note reviewed.   Constitutional:       General: She is not in acute distress.     Appearance: Normal appearance. She is well-developed. She is not diaphoretic.   HENT:      Head: Normocephalic and atraumatic.      Right Ear: Tympanic membrane, ear canal and external ear normal.      Left Ear: Tympanic membrane, ear canal and external ear normal.      Nose: Congestion present.      Mouth/Throat:      Mouth: Mucous membranes are moist.      Pharynx: No oropharyngeal exudate or posterior oropharyngeal erythema.   Neck:      Thyroid: No thyromegaly.   Cardiovascular:      Rate and Rhythm: Normal rate and regular rhythm.      Heart sounds: Normal heart sounds. No murmur heard.  Pulmonary:      Effort: Pulmonary effort is normal.      Breath sounds: Normal breath sounds. No wheezing or rales.   Musculoskeletal:      Cervical back: Normal range of " motion and neck supple.   Lymphadenopathy:      Cervical: No cervical adenopathy.   Skin:     General: Skin is warm and dry.      Findings: No rash.   Neurological:      Mental Status: She is alert and oriented to person, place, and time.   Psychiatric:         Mood and Affect: Mood normal.         Behavior: Behavior normal.         Thought Content: Thought content normal.       The following data was reviewed by Aisha Coffman PA-C:         Lab Results   Component Value Date    BUN 11 06/14/2022    CREATININE 0.70 11/11/2022    EGFR 97.5 06/14/2022    ALT 21 06/14/2022    AST 27 06/14/2022    WBC 5.27 06/14/2022    HGB 13.5 06/14/2022    HCT 42.0 06/14/2022     06/14/2022    CHOL 185 06/14/2022    TRIG 133 06/14/2022    HDL 56 06/14/2022     (H) 06/14/2022    TSH 1.200 06/14/2022    HGBA1C 5.90 (H) 06/14/2022           Assessment & Plan     Diagnoses and all orders for this visit:    1. Nasal congestion (Primary)  Comments:  Start Sudafed as needed  Advised to avoid known environmental allergens when possible  Orders:  -     pseudoephedrine (Sudafed 12 Hour) 120 MG 12 hr tablet; Take 1 tablet by mouth Every 12 (Twelve) Hours.  Dispense: 30 tablet; Refill: 0    2. Post-herpetic polyneuropathy  -     gabapentin (NEURONTIN) 300 MG capsule; Take 1 capsule by mouth 3 (Three) Times a Day.  Dispense: 90 capsule; Refill: 1  -     Comprehensive Metabolic Panel    3. Chronic nonseasonal allergic rhinitis due to pollen  Comments:  Continue Xyzal and Flonase  Advised to avoid known environmental allergens when possible  Orders:  -     levocetirizine (XYZAL) 5 MG tablet; Take 1 tablet by mouth Every Morning.  Dispense: 90 tablet; Refill: 3    4. GERD without esophagitis  Comments:  Continue Protonix  Advised to avoid known trigger foods when possible  Orders:  -     pantoprazole (PROTONIX) 40 MG EC tablet; Take 1 tablet by mouth Daily.  Dispense: 90 tablet; Refill: 3  -     Comprehensive Metabolic Panel    5. Muscle  spasm of both lower legs  Comments:  Continue tizanidine  Orders:  -     tiZANidine (ZANAFLEX) 4 MG tablet; Take 1 tablet by mouth At Night As Needed for Muscle Spasms.  Dispense: 90 tablet; Refill: 3    6. Post-herpetic polyneuropathy  Comments:  Continue gabapentin as needed  Orders:  -     gabapentin (NEURONTIN) 300 MG capsule; Take 1 capsule by mouth 3 (Three) Times a Day.  Dispense: 90 capsule; Refill: 1  -     Comprehensive Metabolic Panel    7. Prediabetes  Comments:  Advise low carbohydrate diet  Orders:  -     Hemoglobin A1c    8. Vitamin D deficiency  Comments:  Continue vitamin D supplementation  Orders:  -     Vitamin D,25-Hydroxy    9. Screening for hyperlipidemia  -     Lipid Panel        Class 2 Severe Obesity (BMI >=35 and <=39.9). Obesity-related health conditions include the following: dyslipidemias and GERD. Obesity is improving with lifestyle modifications. BMI is is above average; BMI management plan is completed. We discussed portion control and increasing exercise.          Patient was given instructions and counseling regarding his condition or for health maintenance advice. Please see specific information pulled into the AVS if appropriate      This document has been electronically signed by:  Aisha Coffman PA-C

## 2023-08-15 LAB
25(OH)D3 SERPL-MCNC: 52.3 NG/ML (ref 30–100)
ALBUMIN SERPL-MCNC: 4.4 G/DL (ref 3.5–5.2)
ALBUMIN/GLOB SERPL: 1.7 G/DL
ALP SERPL-CCNC: 88 U/L (ref 39–117)
ALT SERPL W P-5'-P-CCNC: 24 U/L (ref 1–33)
ANION GAP SERPL CALCULATED.3IONS-SCNC: 10.9 MMOL/L (ref 5–15)
AST SERPL-CCNC: 29 U/L (ref 1–32)
BILIRUB SERPL-MCNC: 0.3 MG/DL (ref 0–1.2)
BUN SERPL-MCNC: 15 MG/DL (ref 8–23)
BUN/CREAT SERPL: 20 (ref 7–25)
CALCIUM SPEC-SCNC: 9.2 MG/DL (ref 8.6–10.5)
CHLORIDE SERPL-SCNC: 106 MMOL/L (ref 98–107)
CHOLEST SERPL-MCNC: 174 MG/DL (ref 0–200)
CO2 SERPL-SCNC: 24.1 MMOL/L (ref 22–29)
CREAT SERPL-MCNC: 0.75 MG/DL (ref 0.57–1)
EGFRCR SERPLBLD CKD-EPI 2021: 90.7 ML/MIN/1.73
GLOBULIN UR ELPH-MCNC: 2.6 GM/DL
GLUCOSE SERPL-MCNC: 90 MG/DL (ref 65–99)
HBA1C MFR BLD: 5.6 % (ref 4.8–5.6)
HDLC SERPL-MCNC: 50 MG/DL (ref 40–60)
LDLC SERPL CALC-MCNC: 97 MG/DL (ref 0–100)
LDLC/HDLC SERPL: 1.85 {RATIO}
POTASSIUM SERPL-SCNC: 4.5 MMOL/L (ref 3.5–5.2)
PROT SERPL-MCNC: 7 G/DL (ref 6–8.5)
SODIUM SERPL-SCNC: 141 MMOL/L (ref 136–145)
TRIGL SERPL-MCNC: 157 MG/DL (ref 0–150)
VLDLC SERPL-MCNC: 27 MG/DL (ref 5–40)

## 2023-10-16 ENCOUNTER — TRANSCRIBE ORDERS (OUTPATIENT)
Dept: ADMINISTRATIVE | Facility: HOSPITAL | Age: 63
End: 2023-10-16
Payer: COMMERCIAL

## 2023-10-16 DIAGNOSIS — Z12.31 VISIT FOR SCREENING MAMMOGRAM: Primary | ICD-10-CM

## 2023-10-17 DIAGNOSIS — Z78.0 MENOPAUSE: Primary | ICD-10-CM

## 2023-10-23 NOTE — PATIENT INSTRUCTIONS
Carbohydrate Counting for Diabetes Mellitus, Adult  Carbohydrate counting is a method of keeping track of how many carbohydrates you eat. Eating carbohydrates increases the amount of sugar (glucose) in the blood. Counting how many carbohydrates you eat improves how well you manage your blood glucose. This, in turn, helps you manage your diabetes.  Carbohydrates are measured in grams (g) per serving. It is important to know how many carbohydrates (in grams or by serving size) you can have in each meal. This is different for every person. A dietitian can help you make a meal plan and calculate how many carbohydrates you should have at each meal and snack.  What foods contain carbohydrates?  Carbohydrates are found in the following foods:  Grains, such as breads and cereals.  Dried beans and soy products.  Starchy vegetables, such as potatoes, peas, and corn.  Fruit and fruit juices.  Milk and yogurt.  Sweets and snack foods, such as cake, cookies, candy, chips, and soft drinks.  How do I count carbohydrates in foods?  There are two ways to count carbohydrates in food. You can read food labels or learn standard serving sizes of foods. You can use either of these methods or a combination of both.  Using the Nutrition Facts label  The Nutrition Facts list is included on the labels of almost all packaged foods and beverages in the [place]. It includes:  The serving size.  Information about nutrients in each serving, including the grams of carbohydrate per serving.  To use the Nutrition Facts, decide how many servings you will have. Then, multiply the number of servings by the number of carbohydrates per serving. The resulting number is the total grams of carbohydrates that you will be having.  Learning the standard serving sizes of foods  When you eat carbohydrate foods that are not packaged or do not include Nutrition Facts on the label, you need to measure the servings in order to count the grams of  carbohydrates.  Measure the foods that you will eat with a food scale or measuring cup, if needed.  Decide how many standard-size servings you will eat.  Multiply the number of servings by 15. For foods that contain carbohydrates, one serving equals 15 g of carbohydrates.  For example, if you eat 2 cups or 10 oz (300 g) of strawberries, you will have eaten 2 servings and 30 g of carbohydrates (2 servings x 15 g = 30 g).  For foods that have more than one food mixed, such as soups and casseroles, you must count the carbohydrates in each food that is included.  The following list contains standard serving sizes of common carbohydrate-rich foods. Each of these servings has about 15 g of carbohydrates:  1 slice of bread.  1 six-inch (15 cm) tortilla.  ? cup or 2 oz (53 g) cooked rice or pasta.  « cup or 3 oz (85 g) cooked or canned, drained and rinsed beans or lentils.  « cup or 3 oz (85 g) starchy vegetable, such as peas, corn, or squash.  « cup or 4 oz (120 g) hot cereal.  « cup or 3 oz (85 g) boiled or mashed potatoes, or ¬ or 3 oz (85 g) of a large baked potato.  « cup or 4 fl oz (118 mL) fruit juice.  1 cup or 8 fl oz (237 mL) milk.  1 small or 4 oz (106 g) apple.  « or 2 oz (63 g) of a medium banana.  1 cup or 5 oz (150 g) strawberries.  3 cups or 1 oz (28.3 g) popped popcorn.  What is an example of carbohydrate counting?  To calculate the grams of carbohydrates in this sample meal, follow the steps shown below.  Sample meal  3 oz (85 g) chicken breast.  ? cup or 4 oz (106 g) brown rice.  « cup or 3 oz (85 g) corn.  1 cup or 8 fl oz (237 mL) milk.  1 cup or 5 oz (150 g) strawberries with sugar-free whipped topping.  Carbohydrate calculation  Identify the foods that contain carbohydrates:  Rice.  Corn.  Milk.  Strawberries.  Calculate how many servings you have of each food:  2 servings rice.  1 serving corn.  1 serving milk.  1 serving strawberries.  Multiply each number of servings by 15  servings rice x 15  g = 30 g.  1 serving corn x 15 g = 15 g.  1 serving milk x 15 g = 15 g.  1 serving strawberries x 15 g = 15 g.  Add together all of the amounts to find the total grams of carbohydrates eaten:  30 g + 15 g + 15 g + 15 g = 75 g of carbohydrates total.  What are tips for following this plan?  Shopping  Develop a meal plan and then make a shopping list.  Buy fresh and frozen vegetables, fresh and frozen fruit, dairy, eggs, beans, lentils, and whole grains.  Look at food labels. Choose foods that have more fiber and less sugar.  Avoid processed foods and foods with added sugars.  Meal planning  Aim to have the same number of grams of carbohydrates at each meal and for each snack time.  Plan to have regular, balanced meals and snacks.  Where to find more information  American Diabetes Association: diabetes.org  Centers for Disease Control and Prevention: cdc.gov  Academy of Nutrition and Dietetics: eatright.org  Association of Diabetes Care & Education Specialists: diabeteseducator.org  Summary  Carbohydrate counting is a method of keeping track of how many carbohydrates you eat.  Eating carbohydrates increases the amount of sugar (glucose) in your blood.  Counting how many carbohydrates you eat improves how well you manage your blood glucose. This helps you manage your diabetes.  A dietitian can help you make a meal plan and calculate how many carbohydrates you should have at each meal and snack.  This information is not intended to replace advice given to you by your health care provider. Make sure you discuss any questions you have with your health care provider.  Document Revised: 07/21/2021 Document Reviewed: 07/21/2021  [employer] Patient Education c 2023 [employer]     Hemigard Postcare Instructions: The HEMIGARD strips are to remain completely dry for at least 5-7 days.

## 2023-11-28 ENCOUNTER — HOSPITAL ENCOUNTER (OUTPATIENT)
Dept: BONE DENSITY | Facility: HOSPITAL | Age: 63
Discharge: HOME | End: 2023-11-28
Payer: COMMERCIAL

## 2023-11-28 ENCOUNTER — HOSPITAL ENCOUNTER (OUTPATIENT)
Dept: MAMMOGRAPHY | Facility: HOSPITAL | Age: 63
Discharge: HOME | End: 2023-11-28
Payer: COMMERCIAL

## 2023-11-28 DIAGNOSIS — Z12.31 VISIT FOR SCREENING MAMMOGRAM: ICD-10-CM

## 2023-11-28 DIAGNOSIS — M85.88 OSTEOPENIA OF LUMBAR SPINE: Primary | ICD-10-CM

## 2023-11-28 DIAGNOSIS — Z78.0 MENOPAUSE: ICD-10-CM

## 2023-11-28 PROCEDURE — 77063 BREAST TOMOSYNTHESIS BI: CPT

## 2023-11-28 PROCEDURE — 77067 SCR MAMMO BI INCL CAD: CPT

## 2023-11-28 PROCEDURE — 77080 DXA BONE DENSITY AXIAL: CPT

## 2023-11-28 RX ORDER — ALENDRONATE SODIUM 70 MG/1
70 TABLET ORAL
Qty: 15 TABLET | Refills: 3 | Status: SHIPPED | OUTPATIENT
Start: 2023-11-28

## 2023-12-19 DIAGNOSIS — K21.9 GERD WITHOUT ESOPHAGITIS: Chronic | ICD-10-CM

## 2023-12-19 RX ORDER — PANTOPRAZOLE SODIUM 40 MG/1
40 TABLET, DELAYED RELEASE ORAL DAILY
Qty: 90 TABLET | Refills: 3 | Status: SHIPPED | OUTPATIENT
Start: 2023-12-19

## 2023-12-28 DIAGNOSIS — M81.0 SENILE OSTEOPOROSIS: Primary | ICD-10-CM

## 2023-12-28 RX ORDER — IBANDRONATE SODIUM 150 MG/1
150 TABLET, FILM COATED ORAL
Qty: 1 TABLET | Refills: 5 | Status: SHIPPED | OUTPATIENT
Start: 2023-12-28

## 2023-12-28 RX ORDER — FAMOTIDINE 40 MG/1
40 TABLET, FILM COATED ORAL DAILY
Qty: 30 TABLET | Refills: 0 | Status: SHIPPED | OUTPATIENT
Start: 2023-12-28

## 2024-01-31 DIAGNOSIS — R09.81 NASAL CONGESTION: ICD-10-CM

## 2024-02-01 RX ORDER — PSEUDOEPHEDRINE HCL 120 MG/1
120 TABLET, FILM COATED, EXTENDED RELEASE ORAL EVERY 12 HOURS
Qty: 30 TABLET | Refills: 0 | Status: SHIPPED | OUTPATIENT
Start: 2024-02-01

## 2024-02-19 ENCOUNTER — OFFICE VISIT (OUTPATIENT)
Dept: FAMILY MEDICINE CLINIC | Facility: CLINIC | Age: 64
End: 2024-02-19
Payer: COMMERCIAL

## 2024-02-19 VITALS
BODY MASS INDEX: 37.22 KG/M2 | HEIGHT: 64 IN | HEART RATE: 79 BPM | TEMPERATURE: 96.9 F | OXYGEN SATURATION: 98 % | DIASTOLIC BLOOD PRESSURE: 80 MMHG | WEIGHT: 218 LBS | SYSTOLIC BLOOD PRESSURE: 130 MMHG

## 2024-02-19 DIAGNOSIS — K21.9 GERD WITHOUT ESOPHAGITIS: Chronic | ICD-10-CM

## 2024-02-19 DIAGNOSIS — R73.03 PREDIABETES: Chronic | ICD-10-CM

## 2024-02-19 DIAGNOSIS — M81.0 SENILE OSTEOPOROSIS: Chronic | ICD-10-CM

## 2024-02-19 DIAGNOSIS — B02.23 POST-HERPETIC POLYNEUROPATHY: Chronic | ICD-10-CM

## 2024-02-19 DIAGNOSIS — J40 BRONCHITIS: Primary | ICD-10-CM

## 2024-02-19 DIAGNOSIS — E78.2 HYPERLIPEMIA, MIXED: Chronic | ICD-10-CM

## 2024-02-19 PROCEDURE — 99214 OFFICE O/P EST MOD 30 MIN: CPT | Performed by: PHYSICIAN ASSISTANT

## 2024-02-19 RX ORDER — GABAPENTIN 300 MG/1
300 CAPSULE ORAL 3 TIMES DAILY
Qty: 90 CAPSULE | Refills: 1 | Status: SHIPPED | OUTPATIENT
Start: 2024-02-19

## 2024-02-19 RX ORDER — AZITHROMYCIN 250 MG/1
TABLET, FILM COATED ORAL
Qty: 6 TABLET | Refills: 0 | Status: SHIPPED | OUTPATIENT
Start: 2024-02-19

## 2024-02-19 NOTE — PATIENT INSTRUCTIONS
Carbohydrate Counting for Diabetes Mellitus, Adult  Carbohydrate counting is a method of keeping track of how many carbohydrates you eat. Eating carbohydrates increases the amount of sugar (glucose) in the blood. Counting how many carbohydrates you eat improves how well you manage your blood glucose. This, in turn, helps you manage your diabetes.  Carbohydrates are measured in grams (g) per serving. It is important to know how many carbohydrates (in grams or by serving size) you can have in each meal. This is different for every person. A dietitian can help you make a meal plan and calculate how many carbohydrates you should have at each meal and snack.  What foods contain carbohydrates?  Carbohydrates are found in the following foods:  Grains, such as breads and cereals.  Dried beans and soy products.  Starchy vegetables, such as potatoes, peas, and corn.  Fruit and fruit juices.  Milk and yogurt.  Sweets and snack foods, such as cake, cookies, candy, chips, and soft drinks.  How do I count carbohydrates in foods?  There are two ways to count carbohydrates in food. You can read food labels or learn standard serving sizes of foods. You can use either of these methods or a combination of both.  Using the Nutrition Facts label  The Nutrition Facts list is included on the labels of almost all packaged foods and beverages in the [place]. It includes:  The serving size.  Information about nutrients in each serving, including the grams of carbohydrate per serving.  To use the Nutrition Facts, decide how many servings you will have. Then, multiply the number of servings by the number of carbohydrates per serving. The resulting number is the total grams of carbohydrates that you will be having.  Learning the standard serving sizes of foods  When you eat carbohydrate foods that are not packaged or do not include Nutrition Facts on the label, you need to measure the servings in order to count the grams of  carbohydrates.  Measure the foods that you will eat with a food scale or measuring cup, if needed.  Decide how many standard-size servings you will eat.  Multiply the number of servings by 15. For foods that contain carbohydrates, one serving equals 15 g of carbohydrates.  For example, if you eat 2 cups or 10 oz (300 g) of strawberries, you will have eaten 2 servings and 30 g of carbohydrates (2 servings x 15 g = 30 g).  For foods that have more than one food mixed, such as soups and casseroles, you must count the carbohydrates in each food that is included.  The following list contains standard serving sizes of common carbohydrate-rich foods. Each of these servings has about 15 g of carbohydrates:  1 slice of bread.  1 six-inch (15 cm) tortilla.  ? cup or 2 oz (53 g) cooked rice or pasta.  ½ cup or 3 oz (85 g) cooked or canned, drained and rinsed beans or lentils.  ½ cup or 3 oz (85 g) starchy vegetable, such as peas, corn, or squash.  ½ cup or 4 oz (120 g) hot cereal.  ½ cup or 3 oz (85 g) boiled or mashed potatoes, or ¼ or 3 oz (85 g) of a large baked potato.  ½ cup or 4 fl oz (118 mL) fruit juice.  1 cup or 8 fl oz (237 mL) milk.  1 small or 4 oz (106 g) apple.  ½ or 2 oz (63 g) of a medium banana.  1 cup or 5 oz (150 g) strawberries.  3 cups or 1 oz (28.3 g) popped popcorn.  What is an example of carbohydrate counting?  To calculate the grams of carbohydrates in this sample meal, follow the steps shown below.  Sample meal  3 oz (85 g) chicken breast.  ? cup or 4 oz (106 g) brown rice.  ½ cup or 3 oz (85 g) corn.  1 cup or 8 fl oz (237 mL) milk.  1 cup or 5 oz (150 g) strawberries with sugar-free whipped topping.  Carbohydrate calculation  Identify the foods that contain carbohydrates:  Rice.  Corn.  Milk.  Strawberries.  Calculate how many servings you have of each food:  2 servings rice.  1 serving corn.  1 serving milk.  1 serving strawberries.  Multiply each number of servings by 15  servings rice x 15  g = 30 g.  1 serving corn x 15 g = 15 g.  1 serving milk x 15 g = 15 g.  1 serving strawberries x 15 g = 15 g.  Add together all of the amounts to find the total grams of carbohydrates eaten:  30 g + 15 g + 15 g + 15 g = 75 g of carbohydrates total.  What are tips for following this plan?  Shopping  Develop a meal plan and then make a shopping list.  Buy fresh and frozen vegetables, fresh and frozen fruit, dairy, eggs, beans, lentils, and whole grains.  Look at food labels. Choose foods that have more fiber and less sugar.  Avoid processed foods and foods with added sugars.  Meal planning  Aim to have the same number of grams of carbohydrates at each meal and for each snack time.  Plan to have regular, balanced meals and snacks.  Where to find more information  American Diabetes Association: diabetes.org  Centers for Disease Control and Prevention: cdc.gov  Academy of Nutrition and Dietetics: eatright.org  Association of Diabetes Care & Education Specialists: diabeteseducator.org  Summary  Carbohydrate counting is a method of keeping track of how many carbohydrates you eat.  Eating carbohydrates increases the amount of sugar (glucose) in your blood.  Counting how many carbohydrates you eat improves how well you manage your blood glucose. This helps you manage your diabetes.  A dietitian can help you make a meal plan and calculate how many carbohydrates you should have at each meal and snack.  This information is not intended to replace advice given to you by your health care provider. Make sure you discuss any questions you have with your health care provider.  Document Revised: 07/21/2021 Document Reviewed: 07/21/2021  [employer] Patient Education © 2023 [employer]  Fall Prevention in the Home, Adult  Falls can cause injuries and can happen to people of all ages. There are many things you can do to make your home safer and to help prevent falls.  What actions can I take to prevent falls?  General information  Use  good lighting in all rooms. Make sure to:  Replace any light bulbs that burn out.  Turn on the lights in dark areas and use night-lights.  Keep items that you use often in easy-to-reach places. Lower the shelves around your home if needed.  Move furniture so that there are clear paths around it.  Do not use throw rugs or other things on the floor that can make you trip.  If any of your floors are uneven, fix them.  Add color or contrast paint or tape to clearly nhan and help you see:  Grab bars or handrails.  First and last steps of staircases.  Where the edge of each step is.  If you use a ladder or stepladder:  Make sure that it is fully opened. Do not climb a closed ladder.  Make sure the sides of the ladder are locked in place.  Have someone hold the ladder while you use it.  Know where your pets are as you move through your home.  What can I do in the bathroom?         Keep the floor dry. Clean up any water on the floor right away.  Remove soap buildup in the bathtub or shower. Buildup makes bathtubs and showers slippery.  Use non-skid mats or decals on the floor of the bathtub or shower.  Attach bath mats securely with double-sided, non-slip rug tape.  If you need to sit down in the shower, use a non-slip stool.  Install grab bars by the toilet and in the bathtub and shower. Do not use towel bars as grab bars.  What can I do in the bedroom?  Make sure that you have a light by your bed that is easy to reach.  Do not use any sheets or blankets on your bed that hang to the floor.  Have a firm chair or bench with side arms that you can use for support when you get dressed.  What can I do in the kitchen?  Clean up any spills right away.  If you need to reach something above you, use a step stool with a grab bar.  Keep electrical cords out of the way.  Do not use floor polish or wax that makes floors slippery.  What can I do with my stairs?  Do not leave anything on the stairs.  Make sure that you have a light  switch at the top and the bottom of the stairs.  Make sure that there are handrails on both sides of the stairs. Fix handrails that are broken or loose.  Install non-slip stair treads on all your stairs if they do not have carpet.  Avoid having throw rugs at the top or bottom of the stairs.  Choose a carpet that does not hide the edge of the steps on the stairs. Make sure that the carpet is firmly attached to the stairs. Fix carpet that is loose or worn.  What can I do on the outside of my home?  Use bright outdoor lighting.  Fix the edges of walkways and driveways and fix any cracks. Clear paths of anything that can make you trip, such as tools or rocks.  Add color or contrast paint or tape to clearly nhan and help you see anything that might make you trip as you walk through a door, such as a raised step or threshold.  Trim any bushes or trees on paths to your home.  Check to see if handrails are loose or broken and that both sides of all steps have handrails. Install guardrails along the edges of any raised decks and porches.  Have leaves, snow, or ice cleared regularly. Use sand, salt, or ice melter on paths if you live where there is ice and snow during the winter.  Clean up any spills in your garage right away. This includes grease or oil spills.  What other actions can I take?  Review your medicines with your doctor. Some medicines can cause dizziness or changes in blood pressure, which increase your risk of falling.  Wear shoes that:  Have a low heel. Do not wear high heels.  Have rubber bottoms and are closed at the toe.  Feel good on your feet and fit well.  Use tools that help you move around if needed. These include:  Canes.  Walkers.  Scooters.  Crutches.  Ask your doctor what else you can do to help prevent falls. This may include seeing a physical therapist to learn to do exercises to move better and get stronger.  Where to find more information  Centers for Disease Control and PreventionEMILIE:  cdc.gov  National [place] on Aging: [place].nih.gov  National [place] on Aging: [place].nih.gov  Contact a doctor if:  You are afraid of falling at home.  You feel weak, drowsy, or dizzy at home.  You fall at home.  Get help right away if you:  Lose consciousness or have trouble moving after a fall.  Have a fall that causes a head injury.  These symptoms may be an emergency. Get help right away. Call 911.  Do not wait to see if the symptoms will go away.  Do not drive yourself to the hospital.  This information is not intended to replace advice given to you by your health care provider. Make sure you discuss any questions you have with your health care provider.  Document Revised: 08/21/2023 Document Reviewed: 08/21/2023  [employer] Patient Education © 2023 [employer]

## 2024-02-19 NOTE — PROGRESS NOTES
"Chief Complaint -cough    History of Present Illness -     Jennifer Horne is a 64 y.o. female.     Cough-  Patient reports a dry cough with associated shortness of breath with exertion.  Onset 1 week.  She is on day 10 since being diagnosed with COVID-19.  She reports no fever but a worsening of cough and chest congestion.    Postherpetic neuropathy-  Stable with gabapentin    Gastroesophageal reflux disease-  Stable with pantoprazole daily and famotidine as needed    Osteoporosis-  Stable with Boniva, calcium and vitamin D supplementation.  She was unable to tolerate Fosamax secondary to GI intolerance    Prediabetes-  Stable with dietary modification      The following portions of the patient's history were reviewed and updated as appropriate: allergies, current medications, past family history, past medical history, past social history, past surgical history and problem list.        Objective  Vital signs:  /80 (BP Location: Left arm, Patient Position: Sitting, Cuff Size: Adult)   Pulse 79   Temp 96.9 °F (36.1 °C) (Temporal)   Ht 162.6 cm (64.02\")   Wt 98.9 kg (218 lb)   SpO2 98%   BMI 37.40 kg/m²     Physical Exam  Vitals and nursing note reviewed.   Constitutional:       General: She is not in acute distress.     Appearance: Normal appearance. She is well-developed. She is not diaphoretic.   HENT:      Head: Normocephalic and atraumatic.      Right Ear: Tympanic membrane, ear canal and external ear normal.      Left Ear: Tympanic membrane, ear canal and external ear normal.      Nose: Nose normal.      Mouth/Throat:      Mouth: Mucous membranes are moist.      Pharynx: No oropharyngeal exudate or posterior oropharyngeal erythema.   Neck:      Thyroid: No thyromegaly.   Cardiovascular:      Rate and Rhythm: Normal rate and regular rhythm.      Heart sounds: Normal heart sounds. No murmur heard.  Pulmonary:      Effort: Pulmonary effort is normal.      Breath sounds: Normal breath sounds. No " wheezing or rales.   Musculoskeletal:      Cervical back: Normal range of motion and neck supple.   Lymphadenopathy:      Cervical: No cervical adenopathy.   Skin:     General: Skin is warm and dry.      Findings: No rash.   Neurological:      Mental Status: She is alert and oriented to person, place, and time.   Psychiatric:         Mood and Affect: Mood normal.         Behavior: Behavior normal.         Thought Content: Thought content normal.         The following data was reviewed by Aisha Coffman PA-C:         Lab Results   Component Value Date    BUN 15 08/14/2023    CREATININE 0.75 08/14/2023    EGFR 90.7 08/14/2023    ALT 24 08/14/2023    AST 29 08/14/2023    WBC 5.27 06/14/2022    HGB 13.5 06/14/2022    HCT 42.0 06/14/2022     06/14/2022    CHOL 174 08/14/2023    TRIG 157 (H) 08/14/2023    HDL 50 08/14/2023    LDL 97 08/14/2023    TSH 1.200 06/14/2022    HGBA1C 5.60 08/14/2023           Assessment & Plan     Diagnoses and all orders for this visit:    1. Bronchitis (Primary)  Comments:  Plan to empirically treat for pneumonia secondary to influenza  Start azithromycin  Orders:  -     azithromycin (Zithromax) 250 MG tablet; Take 2 tablets the first day, then 1 tablet daily for 4 days.  Dispense: 6 tablet; Refill: 0    2. Post-herpetic polyneuropathy  -     gabapentin (NEURONTIN) 300 MG capsule; Take 1 capsule by mouth 3 (Three) Times a Day.  Dispense: 90 capsule; Refill: 1    3. GERD without esophagitis  Comments:  Continue pantoprazole and famotidine  Advised to avoid known trigger foods  Orders:  -     Comprehensive Metabolic Panel; Future    4. Senile osteoporosis  Comments:  Continue Boniva, calcium and vitamin D supplementation  Fall risk precautions advised  Orders:  -     Comprehensive Metabolic Panel; Future    5. Prediabetes  Comments:  Advise low carbohydrate diabetic diet  Orders:  -     Hemoglobin A1c; Future  -     Lipid Panel; Future    6. Hyperlipemia, mixed  Comments:  Advise low-carb  diet  Orders:  -     Lipid Panel; Future                   Patient was given instructions and counseling regarding his condition or for health maintenance advice. Please see specific information pulled into the AVS if appropriate      This document has been electronically signed by:  Aisha Coffman PA-C

## 2024-03-18 RX ORDER — FAMOTIDINE 40 MG/1
40 TABLET, FILM COATED ORAL DAILY
Qty: 30 TABLET | Refills: 0 | Status: SHIPPED | OUTPATIENT
Start: 2024-03-18

## 2024-04-18 RX ORDER — FAMOTIDINE 40 MG/1
40 TABLET, FILM COATED ORAL DAILY
Qty: 30 TABLET | Refills: 5 | Status: SHIPPED | OUTPATIENT
Start: 2024-04-18

## 2024-05-13 ENCOUNTER — LAB (OUTPATIENT)
Dept: FAMILY MEDICINE CLINIC | Facility: CLINIC | Age: 64
End: 2024-05-13
Payer: COMMERCIAL

## 2024-05-13 DIAGNOSIS — M81.0 SENILE OSTEOPOROSIS: Chronic | ICD-10-CM

## 2024-05-13 DIAGNOSIS — K21.9 GERD WITHOUT ESOPHAGITIS: Chronic | ICD-10-CM

## 2024-05-13 DIAGNOSIS — R73.03 PREDIABETES: Chronic | ICD-10-CM

## 2024-05-13 DIAGNOSIS — E78.2 HYPERLIPEMIA, MIXED: Chronic | ICD-10-CM

## 2024-05-13 LAB
ALBUMIN SERPL-MCNC: 4.3 G/DL (ref 3.5–5.2)
ALBUMIN/GLOB SERPL: 1.6 G/DL
ALP SERPL-CCNC: 88 U/L (ref 39–117)
ALT SERPL W P-5'-P-CCNC: 16 U/L (ref 1–33)
ANION GAP SERPL CALCULATED.3IONS-SCNC: 9.7 MMOL/L (ref 5–15)
AST SERPL-CCNC: 19 U/L (ref 1–32)
BILIRUB SERPL-MCNC: 0.4 MG/DL (ref 0–1.2)
BUN SERPL-MCNC: 12 MG/DL (ref 8–23)
BUN/CREAT SERPL: 14.8 (ref 7–25)
CALCIUM SPEC-SCNC: 9.4 MG/DL (ref 8.6–10.5)
CHLORIDE SERPL-SCNC: 106 MMOL/L (ref 98–107)
CHOLEST SERPL-MCNC: 151 MG/DL (ref 0–200)
CO2 SERPL-SCNC: 27.3 MMOL/L (ref 22–29)
CREAT SERPL-MCNC: 0.81 MG/DL (ref 0.57–1)
EGFRCR SERPLBLD CKD-EPI 2021: 82.2 ML/MIN/1.73
GLOBULIN UR ELPH-MCNC: 2.7 GM/DL
GLUCOSE SERPL-MCNC: 89 MG/DL (ref 65–99)
HBA1C MFR BLD: 5.6 % (ref 4.8–5.6)
HDLC SERPL-MCNC: 52 MG/DL (ref 40–60)
LDLC SERPL CALC-MCNC: 82 MG/DL (ref 0–100)
LDLC/HDLC SERPL: 1.56 {RATIO}
POTASSIUM SERPL-SCNC: 4.6 MMOL/L (ref 3.5–5.2)
PROT SERPL-MCNC: 7 G/DL (ref 6–8.5)
SODIUM SERPL-SCNC: 143 MMOL/L (ref 136–145)
TRIGL SERPL-MCNC: 90 MG/DL (ref 0–150)
VLDLC SERPL-MCNC: 17 MG/DL (ref 5–40)

## 2024-05-13 PROCEDURE — 80053 COMPREHEN METABOLIC PANEL: CPT | Performed by: PHYSICIAN ASSISTANT

## 2024-05-13 PROCEDURE — 83036 HEMOGLOBIN GLYCOSYLATED A1C: CPT | Performed by: PHYSICIAN ASSISTANT

## 2024-05-13 PROCEDURE — 36415 COLL VENOUS BLD VENIPUNCTURE: CPT | Performed by: PHYSICIAN ASSISTANT

## 2024-05-13 PROCEDURE — 80061 LIPID PANEL: CPT | Performed by: PHYSICIAN ASSISTANT

## 2024-05-20 ENCOUNTER — TELEMEDICINE (OUTPATIENT)
Dept: FAMILY MEDICINE CLINIC | Facility: CLINIC | Age: 64
End: 2024-05-20
Payer: COMMERCIAL

## 2024-05-20 VITALS — HEIGHT: 64 IN | WEIGHT: 214 LBS | BODY MASS INDEX: 36.54 KG/M2

## 2024-05-20 DIAGNOSIS — Z98.890 STATUS POST GASTRIC SURGERY: Chronic | ICD-10-CM

## 2024-05-20 DIAGNOSIS — E66.01 CLASS 2 SEVERE OBESITY DUE TO EXCESS CALORIES WITH SERIOUS COMORBIDITY AND BODY MASS INDEX (BMI) OF 36.0 TO 36.9 IN ADULT: Primary | Chronic | ICD-10-CM

## 2024-05-20 DIAGNOSIS — B02.23 POST-HERPETIC POLYNEUROPATHY: Chronic | ICD-10-CM

## 2024-05-20 DIAGNOSIS — M81.0 SENILE OSTEOPOROSIS: Chronic | ICD-10-CM

## 2024-05-20 DIAGNOSIS — E55.9 VITAMIN D DEFICIENCY: Chronic | ICD-10-CM

## 2024-05-20 PROBLEM — M19.90 OSTEOARTHRITIS: Status: ACTIVE | Noted: 2024-04-10

## 2024-05-20 PROCEDURE — 99214 OFFICE O/P EST MOD 30 MIN: CPT | Performed by: PHYSICIAN ASSISTANT

## 2024-05-20 RX ORDER — TIRZEPATIDE 2.5 MG/.5ML
INJECTION, SOLUTION SUBCUTANEOUS
COMMUNITY

## 2024-05-20 RX ORDER — GABAPENTIN 300 MG/1
300 CAPSULE ORAL 3 TIMES DAILY
Qty: 90 CAPSULE | Refills: 1 | Status: SHIPPED | OUTPATIENT
Start: 2024-05-20

## 2024-05-20 RX ORDER — IBANDRONATE SODIUM 150 MG/1
150 TABLET, FILM COATED ORAL
Qty: 1 TABLET | Refills: 5 | Status: SHIPPED | OUTPATIENT
Start: 2024-05-20

## 2024-05-20 RX ORDER — ONDANSETRON 8 MG/1
TABLET, ORALLY DISINTEGRATING ORAL
COMMUNITY

## 2024-05-20 NOTE — PROGRESS NOTES
"Video Telehealth Visit    This service was conducted via video including audio/visual technology through the use of [employer].    The patient has high risk comorbidities which necessitates this telehealth service.    The patient is located at their home.  I am located at the [facility] office.  Other participants in this video visit include the patient.    You have chosen to receive care through a telehealth visit.  Do you consent to use a video/audio connection for your medical care today? Yes        Subjective   Jennifer Horne is a 64 y.o. female.       Chief Complaint -obesity    History of Present Illness -       Obesity-  She has had bariatric surgery.  She went back to see bariatrics and they started her on Zepbound 2.5 mg weekly.  She has finished her fourth shot yesterday and states that she is starting to have some increase in her appetite at this dose.  She does follow a lower calorie healthy food option diet.    Postherpetic neuropathy-  Stable with gabapentin    Osteoporosis-  Stable with Boniva, calcium and vitamin D supplementation    Vitamin D deficiency-  Stable with vitamin D supplementation    The following portions of the patient's history were reviewed and updated as appropriate: allergies, current medications, past family history, past medical history, past social history, past surgical history and problem list.        Objective  Vital signs:  Ht 162.6 cm (64.02\")   Wt 97.1 kg (214 lb)   BMI 36.71 kg/m²     Physical Exam  Vitals and nursing note reviewed.   Constitutional:       Appearance: She is obese.   Pulmonary:      Effort: Pulmonary effort is normal.      Breath sounds: Normal breath sounds. No wheezing.      Comments: No wheezing or heavy breathing noted on phone today  Neurological:      Mental Status: She is alert and oriented to person, place, and time.   Psychiatric:         Mood and Affect: Mood normal.         Thought Content: Thought content " normal.                  Lab Results   Component Value Date    BUN 12 05/13/2024    CREATININE 0.81 05/13/2024    EGFR 82.2 05/13/2024    ALT 16 05/13/2024    AST 19 05/13/2024    WBC 5.27 06/14/2022    HGB 13.5 06/14/2022    HCT 42.0 06/14/2022     06/14/2022    CHOL 151 05/13/2024    TRIG 90 05/13/2024    HDL 52 05/13/2024    LDL 82 05/13/2024    TSH 1.200 06/14/2022    HGBA1C 5.60 05/13/2024           Assessment & Plan     Diagnoses and all orders for this visit:    1. Class 2 severe obesity due to excess calories with serious comorbidity and body mass index (BMI) of 36.0 to 36.9 in adult (Primary)  Comments:  increase zepbound 5 mg weekly  Advise 30 minutes CV activity daily  Orders:  -     Tirzepatide-Weight Management (ZEPBOUND) 5 MG/0.5ML solution auto-injector; Inject 0.5 mL under the skin into the appropriate area as directed 1 (One) Time Per Week.  Dispense: 2 mL; Refill: 5    2. Post-herpetic polyneuropathy  -     gabapentin (NEURONTIN) 300 MG capsule; Take 1 capsule by mouth 3 (Three) Times a Day.  Dispense: 90 capsule; Refill: 1    3. Senile osteoporosis  Comments:  Continue Boniva  Fall risk precautions advised  Orders:  -     ibandronate (Boniva) 150 MG tablet; Take 1 tablet by mouth Every 30 (Thirty) Days.  Dispense: 1 tablet; Refill: 5    4. Status post gastric surgery  Comments:  Continue small higher protein meals throughout the day    5. Vitamin D deficiency  Comments:  Continue vitamin D supplementation                   Patient was given instructions and counseling regarding his condition or for health maintenance advice. Please see specific information pulled into the AVS if appropriate      This document has been electronically signed by:  Aisha Coffman PA-C

## 2024-05-20 NOTE — PATIENT INSTRUCTIONS
Fall Prevention in the Home, Adult  Falls can cause injuries and can happen to people of all ages. There are many things you can do to make your home safer and to help prevent falls.  What actions can I take to prevent falls?  General information  Use good lighting in all rooms. Make sure to:  Replace any light bulbs that burn out.  Turn on the lights in dark areas and use night-lights.  Keep items that you use often in easy-to-reach places. Lower the shelves around your home if needed.  Move furniture so that there are clear paths around it.  Do not use throw rugs or other things on the floor that can make you trip.  If any of your floors are uneven, fix them.  Add color or contrast paint or tape to clearly nhan and help you see:  Grab bars or handrails.  First and last steps of staircases.  Where the edge of each step is.  If you use a ladder or stepladder:  Make sure that it is fully opened. Do not climb a closed ladder.  Make sure the sides of the ladder are locked in place.  Have someone hold the ladder while you use it.  Know where your pets are as you move through your home.  What can I do in the bathroom?         Keep the floor dry. Clean up any water on the floor right away.  Remove soap buildup in the bathtub or shower. Buildup makes bathtubs and showers slippery.  Use non-skid mats or decals on the floor of the bathtub or shower.  Attach bath mats securely with double-sided, non-slip rug tape.  If you need to sit down in the shower, use a non-slip stool.  Install grab bars by the toilet and in the bathtub and shower. Do not use towel bars as grab bars.  What can I do in the bedroom?  Make sure that you have a light by your bed that is easy to reach.  Do not use any sheets or blankets on your bed that hang to the floor.  Have a firm chair or bench with side arms that you can use for support when you get dressed.  What can I do in the kitchen?  Clean up any spills right away.  If you need to reach something  above you, use a step stool with a grab bar.  Keep electrical cords out of the way.  Do not use floor polish or wax that makes floors slippery.  What can I do with my stairs?  Do not leave anything on the stairs.  Make sure that you have a light switch at the top and the bottom of the stairs.  Make sure that there are handrails on both sides of the stairs. Fix handrails that are broken or loose.  Install non-slip stair treads on all your stairs if they do not have carpet.  Avoid having throw rugs at the top or bottom of the stairs.  Choose a carpet that does not hide the edge of the steps on the stairs. Make sure that the carpet is firmly attached to the stairs. Fix carpet that is loose or worn.  What can I do on the outside of my home?  Use bright outdoor lighting.  Fix the edges of walkways and driveways and fix any cracks. Clear paths of anything that can make you trip, such as tools or rocks.  Add color or contrast paint or tape to clearly nhan and help you see anything that might make you trip as you walk through a door, such as a raised step or threshold.  Trim any bushes or trees on paths to your home.  Check to see if handrails are loose or broken and that both sides of all steps have handrails. Install guardrails along the edges of any raised decks and porches.  Have leaves, snow, or ice cleared regularly. Use sand, salt, or ice melter on paths if you live where there is ice and snow during the winter.  Clean up any spills in your garage right away. This includes grease or oil spills.  What other actions can I take?  Review your medicines with your doctor. Some medicines can cause dizziness or changes in blood pressure, which increase your risk of falling.  Wear shoes that:  Have a low heel. Do not wear high heels.  Have rubber bottoms and are closed at the toe.  Feel good on your feet and fit well.  Use tools that help you move around if needed. These include:  Canes.  Walkers.  Scooters.  Crutches.  Ask  your doctor what else you can do to help prevent falls. This may include seeing a physical therapist to learn to do exercises to move better and get stronger.  Where to find more information  Centers for Disease Control and PreventionEMILIE: cdc.gov  National Ranger on Aging: tawanna.nih.gov  National Ranger on Aging: tawanna.nih.gov  Contact a doctor if:  You are afraid of falling at home.  You feel weak, drowsy, or dizzy at home.  You fall at home.  Get help right away if you:  Lose consciousness or have trouble moving after a fall.  Have a fall that causes a head injury.  These symptoms may be an emergency. Get help right away. Call 911.  Do not wait to see if the symptoms will go away.  Do not drive yourself to the hospital.  This information is not intended to replace advice given to you by your health care provider. Make sure you discuss any questions you have with your health care provider.  Document Revised: 08/21/2023 Document Reviewed: 08/21/2023  Elsevier Patient Education © 2024 [employer]

## 2024-06-16 DIAGNOSIS — J30.89 CHRONIC NONSEASONAL ALLERGIC RHINITIS DUE TO POLLEN: Chronic | ICD-10-CM

## 2024-06-17 RX ORDER — LEVOCETIRIZINE DIHYDROCHLORIDE 5 MG/1
5 TABLET, FILM COATED ORAL EVERY MORNING
Qty: 90 TABLET | Refills: 3 | Status: SHIPPED | OUTPATIENT
Start: 2024-06-17

## 2024-07-08 ENCOUNTER — OFFICE VISIT (OUTPATIENT)
Dept: FAMILY MEDICINE CLINIC | Facility: CLINIC | Age: 64
End: 2024-07-08
Payer: COMMERCIAL

## 2024-07-08 VITALS
TEMPERATURE: 98.3 F | OXYGEN SATURATION: 97 % | SYSTOLIC BLOOD PRESSURE: 124 MMHG | HEIGHT: 64 IN | BODY MASS INDEX: 35 KG/M2 | HEART RATE: 72 BPM | DIASTOLIC BLOOD PRESSURE: 68 MMHG | WEIGHT: 205 LBS

## 2024-07-08 DIAGNOSIS — E66.01 CLASS 2 SEVERE OBESITY DUE TO EXCESS CALORIES WITH SERIOUS COMORBIDITY AND BODY MASS INDEX (BMI) OF 35.0 TO 35.9 IN ADULT: Primary | Chronic | ICD-10-CM

## 2024-07-08 DIAGNOSIS — B02.23 POST-HERPETIC POLYNEUROPATHY: Chronic | ICD-10-CM

## 2024-07-08 DIAGNOSIS — Z12.11 COLON CANCER SCREENING: ICD-10-CM

## 2024-07-08 DIAGNOSIS — K21.9 GERD WITHOUT ESOPHAGITIS: Chronic | ICD-10-CM

## 2024-07-08 PROCEDURE — 99214 OFFICE O/P EST MOD 30 MIN: CPT | Performed by: PHYSICIAN ASSISTANT

## 2024-07-08 RX ORDER — GABAPENTIN 300 MG/1
300 CAPSULE ORAL 3 TIMES DAILY
Qty: 90 CAPSULE | Refills: 1 | Status: SHIPPED | OUTPATIENT
Start: 2024-07-08

## 2024-07-08 NOTE — PATIENT INSTRUCTIONS
Carbohydrate Counting for Diabetes Mellitus, Adult  Carbohydrate counting is a method of keeping track of how many carbohydrates you eat. Eating carbohydrates increases the amount of sugar (glucose) in the blood. Counting how many carbohydrates you eat improves how well you manage your blood glucose. This, in turn, helps you manage your diabetes.  Carbohydrates are measured in grams (g) per serving. It is important to know how many carbohydrates (in grams or by serving size) you can have in each meal. This is different for every person. A dietitian can help you make a meal plan and calculate how many carbohydrates you should have at each meal and snack.  What foods contain carbohydrates?  Carbohydrates are found in the following foods:  Grains, such as breads and cereals.  Dried beans and soy products.  Starchy vegetables, such as potatoes, peas, and corn.  Fruit and fruit juices.  Milk and yogurt.  Sweets and snack foods, such as cake, cookies, candy, chips, and soft drinks.  How do I count carbohydrates in foods?  There are two ways to count carbohydrates in food. You can read food labels or learn standard serving sizes of foods. You can use either of these methods or a combination of both.  Using the Nutrition Facts label  The Nutrition Facts list is included on the labels of almost all packaged foods and beverages in the [place]. It includes:  The serving size.  Information about nutrients in each serving, including the grams of carbohydrate per serving.  To use the Nutrition Facts, decide how many servings you will have. Then, multiply the number of servings by the number of carbohydrates per serving. The resulting number is the total grams of carbohydrates that you will be having.  Learning the standard serving sizes of foods  When you eat carbohydrate foods that are not packaged or do not include Nutrition Facts on the label, you need to measure the servings in order to count the grams of  carbohydrates.  Measure the foods that you will eat with a food scale or measuring cup, if needed.  Decide how many standard-size servings you will eat.  Multiply the number of servings by 15. For foods that contain carbohydrates, one serving equals 15 g of carbohydrates.  For example, if you eat 2 cups or 10 oz (300 g) of strawberries, you will have eaten 2 servings and 30 g of carbohydrates (2 servings x 15 g = 30 g).  For foods that have more than one food mixed, such as soups and casseroles, you must count the carbohydrates in each food that is included.  The following list contains standard serving sizes of common carbohydrate-rich foods. Each of these servings has about 15 g of carbohydrates:  1 slice of bread.  1 six-inch (15 cm) tortilla.  ? cup or 2 oz (53 g) cooked rice or pasta.  ½ cup or 3 oz (85 g) cooked or canned, drained and rinsed beans or lentils.  ½ cup or 3 oz (85 g) starchy vegetable, such as peas, corn, or squash.  ½ cup or 4 oz (120 g) hot cereal.  ½ cup or 3 oz (85 g) boiled or mashed potatoes, or ¼ or 3 oz (85 g) of a large baked potato.  ½ cup or 4 fl oz (118 mL) fruit juice.  1 cup or 8 fl oz (237 mL) milk.  1 small or 4 oz (106 g) apple.  ½ or 2 oz (63 g) of a medium banana.  1 cup or 5 oz (150 g) strawberries.  3 cups or 1 oz (28.3 g) popped popcorn.  What is an example of carbohydrate counting?  To calculate the grams of carbohydrates in this sample meal, follow the steps shown below.  Sample meal  3 oz (85 g) chicken breast.  ? cup or 4 oz (106 g) brown rice.  ½ cup or 3 oz (85 g) corn.  1 cup or 8 fl oz (237 mL) milk.  1 cup or 5 oz (150 g) strawberries with sugar-free whipped topping.  Carbohydrate calculation  Identify the foods that contain carbohydrates:  Rice.  Corn.  Milk.  Strawberries.  Calculate how many servings you have of each food:  2 servings rice.  1 serving corn.  1 serving milk.  1 serving strawberries.  Multiply each number of servings by 15  servings rice x 15  g = 30 g.  1 serving corn x 15 g = 15 g.  1 serving milk x 15 g = 15 g.  1 serving strawberries x 15 g = 15 g.  Add together all of the amounts to find the total grams of carbohydrates eaten:  30 g + 15 g + 15 g + 15 g = 75 g of carbohydrates total.  What are tips for following this plan?  Shopping  Develop a meal plan and then make a shopping list.  Buy fresh and frozen vegetables, fresh and frozen fruit, dairy, eggs, beans, lentils, and whole grains.  Look at food labels. Choose foods that have more fiber and less sugar.  Avoid processed foods and foods with added sugars.  Meal planning  Aim to have the same number of grams of carbohydrates at each meal and for each snack time.  Plan to have regular, balanced meals and snacks.  Where to find more information  American Diabetes Association: diabetes.org  Centers for Disease Control and Prevention: cdc.gov  Academy of Nutrition and Dietetics: eatright.org  Association of Diabetes Care & Education Specialists: diabeteseducator.org  Summary  Carbohydrate counting is a method of keeping track of how many carbohydrates you eat.  Eating carbohydrates increases the amount of sugar (glucose) in your blood.  Counting how many carbohydrates you eat improves how well you manage your blood glucose. This helps you manage your diabetes.  A dietitian can help you make a meal plan and calculate how many carbohydrates you should have at each meal and snack.  This information is not intended to replace advice given to you by your health care provider. Make sure you discuss any questions you have with your health care provider.  Document Revised: 07/21/2021 Document Reviewed: 07/21/2021  Elsevier Patient Education © 2024 [employer]

## 2024-07-08 NOTE — PROGRESS NOTES
"Chief Complaint -obesity    History of Present Illness -     Jennifer Horne is a 64 y.o. female.     Obesity-  She was doing well with Zepbound 5 mg weekly.  She is lost 9 pounds since May 20, 2024.  She has had bariatric surgery with [facility] in the past.  She reports decreased appetite and has been eating healthier food options.  We discussed the challenge with her not eating breakfast.    Postherpetic neuralgia-  Stable with gabapentin.    Colon cancer screening-  Patient had colonoscopy with Dr. Gunderson in 2016 which she states showed polyps.  She is due for another colon cancer screening by colonoscopy.    Gastroesophageal reflux disease-  Stable with pantoprazole and famotidine      The following portions of the patient's history were reviewed and updated as appropriate: allergies, current medications, past family history, past medical history, past social history, past surgical history and problem list.        Objective  Vital signs:  /68   Pulse 72   Temp 98.3 °F (36.8 °C) (Temporal)   Ht 162.6 cm (64.02\")   Wt 93 kg (205 lb)   SpO2 97%   BMI 35.17 kg/m²     Physical Exam  Vitals and nursing note reviewed.   Constitutional:       Appearance: Normal appearance. She is well-developed.   Eyes:      Extraocular Movements: Extraocular movements intact.      Conjunctiva/sclera: Conjunctivae normal.   Cardiovascular:      Rate and Rhythm: Normal rate and regular rhythm.      Heart sounds: Normal heart sounds. No murmur heard.  Pulmonary:      Effort: Pulmonary effort is normal. No respiratory distress.      Breath sounds: Normal breath sounds. No wheezing.   Musculoskeletal:         General: No tenderness.   Skin:     General: Skin is warm and dry.      Findings: No rash.   Neurological:      Mental Status: She is alert and oriented to person, place, and time.   Psychiatric:         Mood and Affect: Mood normal.         Behavior: Behavior normal.         Thought Content: Thought content " normal.         The following data was reviewed by Aisha Coffman PA-C:         Lab Results   Component Value Date    BUN 12 05/13/2024    CREATININE 0.81 05/13/2024    EGFR 82.2 05/13/2024    ALT 16 05/13/2024    AST 19 05/13/2024    WBC 5.27 06/14/2022    HGB 13.5 06/14/2022    HCT 42.0 06/14/2022     06/14/2022    CHOL 151 05/13/2024    TRIG 90 05/13/2024    HDL 52 05/13/2024    LDL 82 05/13/2024    TSH 1.200 06/14/2022    HGBA1C 5.60 05/13/2024           Assessment & Plan     Diagnoses and all orders for this visit:    1. Class 2 severe obesity due to excess calories with serious comorbidity and body mass index (BMI) of 35.0 to 35.9 in adult (Primary)  Comments:  continue zepbound 5 mg weekly  Advised higher protein breakfast and smaller meals throughout the day    2. Post-herpetic polyneuropathy  Comments:  Continue gabapentin  Orders:  -     gabapentin (NEURONTIN) 300 MG capsule; Take 1 capsule by mouth 3 (Three) Times a Day.  Dispense: 90 capsule; Refill: 1    3. Colon cancer screening  -     Ambulatory Referral For Screening Colonoscopy    4. GERD without esophagitis  Comments:  Continue pantoprazole and famotidine  Advised to avoid known trigger foods                   Patient was given instructions and counseling regarding his condition or for health maintenance advice. Please see specific information pulled into the AVS if appropriate      This document has been electronically signed by:  Aisha Coffman PA-C

## 2024-07-23 ENCOUNTER — OFFICE VISIT (OUTPATIENT)
Dept: SURGERY | Facility: CLINIC | Age: 64
End: 2024-07-23
Payer: COMMERCIAL

## 2024-07-23 VITALS — HEIGHT: 64 IN | BODY MASS INDEX: 35.13 KG/M2 | WEIGHT: 205.8 LBS

## 2024-07-23 DIAGNOSIS — Z12.11 COLON CANCER SCREENING: Primary | ICD-10-CM

## 2024-07-23 RX ORDER — SODIUM, POTASSIUM,MAG SULFATES 17.5-3.13G
1 SOLUTION, RECONSTITUTED, ORAL ORAL EVERY 12 HOURS
Qty: 177 ML | Refills: 0 | Status: SHIPPED | OUTPATIENT
Start: 2024-07-23

## 2024-07-23 NOTE — PROGRESS NOTES
When her scan is fine she could have an ulcer Subjective   Jennifer Horne is a 64 y.o. female is being seen for consultation today at the request of Aisha Coffman PA    Jennifer Horne is a 64 y.o. female     History of Present Illness  The patient presents for a colonoscopy.    The patient's last colonoscopy was conducted approximately 8 years ago, during which a polyp was identified. She was due for a repeat procedure around the COVID-19 pandemic, but failed to attend due to personal circumstances.  She presents today for evaluation for colonoscopy her surgical history includes a Alba-en-Y gastric bypass, hysterectomy, gallbladder removal, and . She reports no presence of blood in her bowel movements.   She has a cousin with a history of colon cancer.    Past Medical History:   Diagnosis Date    Allergic     Arthritis     GERD (gastroesophageal reflux disease)     GERD (gastroesophageal reflux disease)     PONV (postoperative nausea and vomiting)        Family History   Problem Relation Age of Onset    Cancer Mother     Hypertension Father     Hypertension Brother     Febrile seizures Maternal Aunt     Breast cancer Neg Hx        Social History     Socioeconomic History    Marital status:      Spouse name: [place]    Number of children: 1    Years of education: 18   Tobacco Use    Smoking status: Former     Current packs/day: 0.00     Average packs/day: 1 pack/day for 20.0 years (20.0 ttl pk-yrs)     Types: Cigarettes     Start date: 1987     Quit date: 2007     Years since quittin.8     Passive exposure: Past    Smokeless tobacco: Never   Vaping Use    Vaping status: Never Used   Substance and Sexual Activity    Alcohol use: No    Drug use: No    Sexual activity: Defer       Past Surgical History:   Procedure Laterality Date    BREAST BIOPSY Right     benign    CARDIAC CATHETERIZATION N/A 2019    Procedure: Left Heart Cath;  Surgeon: Torsten Sandoval III, MD;  Location:  " [facility];  Service: Cardiovascular     SECTION      CHOLECYSTECTOMY      COLONOSCOPY  2016    GASTRIC BYPASS  2010    HYSTERECTOMY  2008       Review of Systems   Constitutional:  Negative for activity change, appetite change, chills and fever.   HENT:  Negative for sore throat and trouble swallowing.    Eyes:  Negative for visual disturbance.   Respiratory:  Negative for cough and shortness of breath.    Cardiovascular:  Negative for chest pain and palpitations.   Gastrointestinal:  Negative for abdominal distention, abdominal pain, blood in stool, constipation, diarrhea, nausea and vomiting.   Endocrine: Negative for cold intolerance and heat intolerance.   Genitourinary:  Negative for dysuria.   Musculoskeletal:  Negative for joint swelling.   Skin:  Negative for color change, rash and wound.   Allergic/Immunologic: Negative for immunocompromised state.   Neurological:  Negative for dizziness, seizures, weakness and headaches.   Hematological:  Negative for adenopathy. Does not bruise/bleed easily.   Psychiatric/Behavioral:  Negative for agitation and confusion.        Results        Ht 162.6 cm (64\")   Wt 93.4 kg (205 lb 12.8 oz)   BMI 35.33 kg/m²   Objective   Physical Exam  Constitutional:       Appearance: She is well-developed.   HENT:      Head: Normocephalic and atraumatic.   Eyes:      Conjunctiva/sclera: Conjunctivae normal.      Pupils: Pupils are equal, round, and reactive to light.   Neck:      Thyroid: No thyromegaly.      Vascular: No JVD.      Trachea: No tracheal deviation.   Cardiovascular:      Rate and Rhythm: Normal rate and regular rhythm.      Heart sounds: No murmur heard.     No friction rub. No gallop.   Pulmonary:      Effort: Pulmonary effort is normal.      Breath sounds: Normal breath sounds.   Abdominal:      General: There is no distension.      Palpations: Abdomen is soft. There is no hepatomegaly or splenomegaly.      Tenderness: There is no " abdominal tenderness.      Hernia: No hernia is present.   Musculoskeletal:         General: No deformity. Normal range of motion.      Cervical back: Neck supple.   Skin:     General: Skin is warm and dry.   Neurological:      Mental Status: She is alert and oriented to person, place, and time.       Physical Exam      Assessment & Plan  1. Colonoscopy.  A colonoscopy has been scheduled for the patient.          Assessment   Diagnoses and all orders for this visit:    1. Colon cancer screening (Primary)  -     Case Request; Standing  -     Case Request    Other orders  -     sodium-potassium-magnesium sulfates (Suprep Bowel Prep Kit) 17.5-3.13-1.6 GM/177ML solution oral solution; Take 1 bottle by mouth Every 12 (Twelve) Hours.  Dispense: 177 mL; Refill: 0  -     Follow Anesthesia Guidelines / Protocol; Future  -     Follow Anesthesia Guidelines / Protocol; Standing  -     Verify / Perform Chlorhexidine Skin Prep; Standing  -     Obtain Informed Consent; Future  -     Provide NPO Instructions to Patient; Future  -     Chlorhexidine Skin Prep; Future  -     Place Sequential Compression Device; Standing  -     Maintain Sequential Compression Device; Standing        Assessment & Plan  1. Colonoscopy.  A colonoscopy has been scheduled for the patient.    Jennifer Horne is a 64 y.o. female with need for screening colonoscopy.  Risks and benefits have been discussed with the patient and she will proceed.                 This document has been electronically signed by Samir Drake MD   July 23, 2024 11:07 EDT    Patient or patient representative verbalized consent for the use of Ambient Listening during the visit with  Samir Drake MD for chart documentation. 7/23/2024  11:08 EDT

## 2024-07-23 NOTE — H&P (VIEW-ONLY)
When her scan is fine she could have an ulcer Subjective   Jennifer Horne is a 64 y.o. female is being seen for consultation today at the request of Aisha Coffman PA    Jennifer Horne is a 64 y.o. female     History of Present Illness  The patient presents for a colonoscopy.    The patient's last colonoscopy was conducted approximately 8 years ago, during which a polyp was identified. She was due for a repeat procedure around the COVID-19 pandemic, but failed to attend due to personal circumstances.  She presents today for evaluation for colonoscopy her surgical history includes a Alba-en-Y gastric bypass, hysterectomy, gallbladder removal, and . She reports no presence of blood in her bowel movements.   She has a cousin with a history of colon cancer.    Past Medical History:   Diagnosis Date    Allergic     Arthritis     GERD (gastroesophageal reflux disease)     GERD (gastroesophageal reflux disease)     PONV (postoperative nausea and vomiting)        Family History   Problem Relation Age of Onset    Cancer Mother     Hypertension Father     Hypertension Brother     Febrile seizures Maternal Aunt     Breast cancer Neg Hx        Social History     Socioeconomic History    Marital status:      Spouse name: [place]    Number of children: 1    Years of education: 18   Tobacco Use    Smoking status: Former     Current packs/day: 0.00     Average packs/day: 1 pack/day for 20.0 years (20.0 ttl pk-yrs)     Types: Cigarettes     Start date: 1987     Quit date: 2007     Years since quittin.8     Passive exposure: Past    Smokeless tobacco: Never   Vaping Use    Vaping status: Never Used   Substance and Sexual Activity    Alcohol use: No    Drug use: No    Sexual activity: Defer       Past Surgical History:   Procedure Laterality Date    BREAST BIOPSY Right     benign    CARDIAC CATHETERIZATION N/A 2019    Procedure: Left Heart Cath;  Surgeon: Torsten Sandoval III, MD;  Location:  " [facility];  Service: Cardiovascular     SECTION      CHOLECYSTECTOMY      COLONOSCOPY  2016    GASTRIC BYPASS  2010    HYSTERECTOMY  2008       Review of Systems   Constitutional:  Negative for activity change, appetite change, chills and fever.   HENT:  Negative for sore throat and trouble swallowing.    Eyes:  Negative for visual disturbance.   Respiratory:  Negative for cough and shortness of breath.    Cardiovascular:  Negative for chest pain and palpitations.   Gastrointestinal:  Negative for abdominal distention, abdominal pain, blood in stool, constipation, diarrhea, nausea and vomiting.   Endocrine: Negative for cold intolerance and heat intolerance.   Genitourinary:  Negative for dysuria.   Musculoskeletal:  Negative for joint swelling.   Skin:  Negative for color change, rash and wound.   Allergic/Immunologic: Negative for immunocompromised state.   Neurological:  Negative for dizziness, seizures, weakness and headaches.   Hematological:  Negative for adenopathy. Does not bruise/bleed easily.   Psychiatric/Behavioral:  Negative for agitation and confusion.        Results        Ht 162.6 cm (64\")   Wt 93.4 kg (205 lb 12.8 oz)   BMI 35.33 kg/m²   Objective   Physical Exam  Constitutional:       Appearance: She is well-developed.   HENT:      Head: Normocephalic and atraumatic.   Eyes:      Conjunctiva/sclera: Conjunctivae normal.      Pupils: Pupils are equal, round, and reactive to light.   Neck:      Thyroid: No thyromegaly.      Vascular: No JVD.      Trachea: No tracheal deviation.   Cardiovascular:      Rate and Rhythm: Normal rate and regular rhythm.      Heart sounds: No murmur heard.     No friction rub. No gallop.   Pulmonary:      Effort: Pulmonary effort is normal.      Breath sounds: Normal breath sounds.   Abdominal:      General: There is no distension.      Palpations: Abdomen is soft. There is no hepatomegaly or splenomegaly.      Tenderness: There is no " abdominal tenderness.      Hernia: No hernia is present.   Musculoskeletal:         General: No deformity. Normal range of motion.      Cervical back: Neck supple.   Skin:     General: Skin is warm and dry.   Neurological:      Mental Status: She is alert and oriented to person, place, and time.       Physical Exam      Assessment & Plan  1. Colonoscopy.  A colonoscopy has been scheduled for the patient.          Assessment   Diagnoses and all orders for this visit:    1. Colon cancer screening (Primary)  -     Case Request; Standing  -     Case Request    Other orders  -     sodium-potassium-magnesium sulfates (Suprep Bowel Prep Kit) 17.5-3.13-1.6 GM/177ML solution oral solution; Take 1 bottle by mouth Every 12 (Twelve) Hours.  Dispense: 177 mL; Refill: 0  -     Follow Anesthesia Guidelines / Protocol; Future  -     Follow Anesthesia Guidelines / Protocol; Standing  -     Verify / Perform Chlorhexidine Skin Prep; Standing  -     Obtain Informed Consent; Future  -     Provide NPO Instructions to Patient; Future  -     Chlorhexidine Skin Prep; Future  -     Place Sequential Compression Device; Standing  -     Maintain Sequential Compression Device; Standing        Assessment & Plan  1. Colonoscopy.  A colonoscopy has been scheduled for the patient.    Jennifer Horne is a 64 y.o. female with need for screening colonoscopy.  Risks and benefits have been discussed with the patient and she will proceed.                 This document has been electronically signed by Samir Drake MD   July 23, 2024 11:07 EDT    Patient or patient representative verbalized consent for the use of Ambient Listening during the visit with  Samir Drake MD for chart documentation. 7/23/2024  11:08 EDT

## 2024-07-25 DIAGNOSIS — E66.01 CLASS 2 SEVERE OBESITY DUE TO EXCESS CALORIES WITH SERIOUS COMORBIDITY AND BODY MASS INDEX (BMI) OF 35.0 TO 35.9 IN ADULT: Primary | ICD-10-CM

## 2024-08-14 ENCOUNTER — ANESTHESIA (OUTPATIENT)
Dept: PERIOP | Facility: HOSPITAL | Age: 64
End: 2024-08-14
Payer: COMMERCIAL

## 2024-08-14 ENCOUNTER — HOSPITAL ENCOUNTER (OUTPATIENT)
Facility: HOSPITAL | Age: 64
Setting detail: HOSPITAL OUTPATIENT SURGERY
Discharge: HOME | End: 2024-08-14
Attending: SURGERY | Admitting: SURGERY
Payer: COMMERCIAL

## 2024-08-14 ENCOUNTER — ANESTHESIA EVENT (OUTPATIENT)
Dept: PERIOP | Facility: HOSPITAL | Age: 64
End: 2024-08-14
Payer: COMMERCIAL

## 2024-08-14 VITALS
TEMPERATURE: 97 F | BODY MASS INDEX: 33.8 KG/M2 | HEART RATE: 75 BPM | SYSTOLIC BLOOD PRESSURE: 115 MMHG | OXYGEN SATURATION: 99 % | RESPIRATION RATE: 17 BRPM | WEIGHT: 198 LBS | HEIGHT: 64 IN | DIASTOLIC BLOOD PRESSURE: 62 MMHG

## 2024-08-14 DIAGNOSIS — Z12.11 COLON CANCER SCREENING: ICD-10-CM

## 2024-08-14 PROCEDURE — 25010000002 PROPOFOL 200 MG/20ML EMULSION: Performed by: NURSE ANESTHETIST, CERTIFIED REGISTERED

## 2024-08-14 RX ORDER — IPRATROPIUM BROMIDE AND ALBUTEROL SULFATE 2.5; .5 MG/3ML; MG/3ML
3 SOLUTION RESPIRATORY (INHALATION) ONCE AS NEEDED
Status: DISCONTINUED | OUTPATIENT
Start: 2024-08-14 | End: 2024-08-14 | Stop reason: HOSPADM

## 2024-08-14 RX ORDER — MEPERIDINE HYDROCHLORIDE 25 MG/ML
12.5 INJECTION INTRAMUSCULAR; INTRAVENOUS; SUBCUTANEOUS
Status: DISCONTINUED | OUTPATIENT
Start: 2024-08-14 | End: 2024-08-14 | Stop reason: HOSPADM

## 2024-08-14 RX ORDER — ONDANSETRON 2 MG/ML
4 INJECTION INTRAMUSCULAR; INTRAVENOUS AS NEEDED
Status: DISCONTINUED | OUTPATIENT
Start: 2024-08-14 | End: 2024-08-14 | Stop reason: HOSPADM

## 2024-08-14 RX ORDER — PROPOFOL 10 MG/ML
INJECTION, EMULSION INTRAVENOUS AS NEEDED
Status: DISCONTINUED | OUTPATIENT
Start: 2024-08-14 | End: 2024-08-14 | Stop reason: SURG

## 2024-08-14 RX ORDER — SODIUM CHLORIDE, SODIUM LACTATE, POTASSIUM CHLORIDE, CALCIUM CHLORIDE 600; 310; 30; 20 MG/100ML; MG/100ML; MG/100ML; MG/100ML
125 INJECTION, SOLUTION INTRAVENOUS ONCE
Status: DISCONTINUED | OUTPATIENT
Start: 2024-08-14 | End: 2024-08-14 | Stop reason: HOSPADM

## 2024-08-14 RX ORDER — SODIUM CHLORIDE 0.9 % (FLUSH) 0.9 %
10 SYRINGE (ML) INJECTION AS NEEDED
Status: DISCONTINUED | OUTPATIENT
Start: 2024-08-14 | End: 2024-08-14 | Stop reason: HOSPADM

## 2024-08-14 RX ORDER — SODIUM CHLORIDE 9 MG/ML
40 INJECTION, SOLUTION INTRAVENOUS AS NEEDED
Status: DISCONTINUED | OUTPATIENT
Start: 2024-08-14 | End: 2024-08-14 | Stop reason: HOSPADM

## 2024-08-14 RX ORDER — SODIUM CHLORIDE, SODIUM LACTATE, POTASSIUM CHLORIDE, CALCIUM CHLORIDE 600; 310; 30; 20 MG/100ML; MG/100ML; MG/100ML; MG/100ML
100 INJECTION, SOLUTION INTRAVENOUS ONCE AS NEEDED
Status: DISCONTINUED | OUTPATIENT
Start: 2024-08-14 | End: 2024-08-14 | Stop reason: HOSPADM

## 2024-08-14 RX ORDER — SODIUM CHLORIDE 0.9 % (FLUSH) 0.9 %
10 SYRINGE (ML) INJECTION EVERY 12 HOURS SCHEDULED
Status: DISCONTINUED | OUTPATIENT
Start: 2024-08-14 | End: 2024-08-14 | Stop reason: HOSPADM

## 2024-08-14 RX ORDER — MIDAZOLAM HYDROCHLORIDE 1 MG/ML
1 INJECTION INTRAMUSCULAR; INTRAVENOUS
Status: DISCONTINUED | OUTPATIENT
Start: 2024-08-14 | End: 2024-08-14 | Stop reason: HOSPADM

## 2024-08-14 RX ORDER — FENTANYL CITRATE 50 UG/ML
50 INJECTION, SOLUTION INTRAMUSCULAR; INTRAVENOUS
Status: DISCONTINUED | OUTPATIENT
Start: 2024-08-14 | End: 2024-08-14 | Stop reason: HOSPADM

## 2024-08-14 RX ADMIN — PROPOFOL 200 MCG/KG/MIN: 10 INJECTION, EMULSION INTRAVENOUS at 08:36

## 2024-08-14 RX ADMIN — PROPOFOL 50 MG: 10 INJECTION, EMULSION INTRAVENOUS at 08:35

## 2024-08-14 NOTE — ANESTHESIA POSTPROCEDURE EVALUATION
Patient: Jennifer Horne    Procedure Summary       Date: 08/14/24 Room / Location: [facility] OR [address] [place] [address] COR OR    Anesthesia Start: 0833 Anesthesia Stop: 0852    Procedure: COLONOSCOPY Diagnosis:       Colon cancer screening      (Colon cancer screening [Z12.11])    Surgeons: Samir Drake MD Provider: Demarco Yañez DO    Anesthesia Type: general ASA Status: 3            Anesthesia Type: general    Vitals  Vitals Value Taken Time   /62 08/14/24 0925   Temp 97 °F (36.1 °C) 08/14/24 0855   Pulse 75 08/14/24 0925   Resp 17 08/14/24 0925   SpO2 99 % 08/14/24 0925           Post Anesthesia Care and Evaluation    Patient location during evaluation: PHASE II  Patient participation: complete - patient participated  Level of consciousness: awake and alert  Pain score: 0  Pain management: adequate    Airway patency: patent  Anesthetic complications: No anesthetic complications  PONV Status: controlled  Cardiovascular status: acceptable  Respiratory status: acceptable and room air  Hydration status: euvolemic  No anesthesia care post op

## 2024-08-14 NOTE — ANESTHESIA POSTPROCEDURE EVALUATION
Patient: Jennifer Horne    Procedure Summary       Date: 08/14/24 Room / Location: [facility] OR [address] [place] [address] COR OR    Anesthesia Start: 0833 Anesthesia Stop: 0852    Procedure: COLONOSCOPY Diagnosis:       Colon cancer screening      (Colon cancer screening [Z12.11])    Surgeons: Samir Drake MD Provider: Demarco Yañez DO    Anesthesia Type: general ASA Status: 3            Anesthesia Type: general    Vitals  Vitals Value Taken Time   /62 08/14/24 0925   Temp 97 °F (36.1 °C) 08/14/24 0855   Pulse 75 08/14/24 0925   Resp 17 08/14/24 0925   SpO2 99 % 08/14/24 0925           Post Anesthesia Care and Evaluation    Patient location during evaluation: PHASE II  Patient participation: complete - patient participated  Level of consciousness: awake and alert  Pain score: 1  Pain management: adequate    Airway patency: patent  Anesthetic complications: No anesthetic complications  PONV Status: controlled  Cardiovascular status: acceptable  Respiratory status: acceptable  Hydration status: acceptable

## 2024-08-14 NOTE — ANESTHESIA PREPROCEDURE EVALUATION
Anesthesia Evaluation     Patient summary reviewed and Nursing notes reviewed   history of anesthetic complications:  PONV  NPO Solid Status: > 8 hours  NPO Liquid Status: > 8 hours           Airway   Mallampati: II  TM distance: >3 FB  Neck ROM: full  No difficulty expected  Dental - normal exam     Pulmonary     breath sounds clear to auscultation  (+) a smoker Former,decreased breath sounds  Cardiovascular - negative cardio ROS and normal exam    ECG reviewed  Rhythm: regular  Rate: normal        Neuro/Psych  (+) syncope, numbness  GI/Hepatic/Renal/Endo    (+) obesity, morbid obesity, GERD well controlled    Musculoskeletal     Abdominal   (+) obese   Substance History - negative use     OB/GYN negative ob/gyn ROS         Other   arthritis,     ROS/Med Hx Other: Echo 2/3/2022:  ·Left ventricular diastolic function is consistent with (grade I) impaired relaxation.    Last dose of Zepbound was on 8/11/2024.                       Anesthesia Plan    ASA 3     general   total IV anesthesia  (Ms. Horne took her Zepbound 3 days ago.  She denies any GI related issues associated with the medicatio including N/V and GERD.  She is able to lay flat without any GI like symptoms. )  intravenous induction     Anesthetic plan, risks, benefits, and alternatives have been provided, discussed and informed consent has been obtained with: patient and spouse/significant other.  Pre-procedure education provided  Plan discussed with CRNA.    CODE STATUS:

## 2024-08-15 LAB — REF LAB TEST METHOD: NORMAL

## 2024-09-11 ENCOUNTER — OFFICE VISIT (OUTPATIENT)
Dept: SURGERY | Facility: CLINIC | Age: 64
End: 2024-09-11
Payer: COMMERCIAL

## 2024-09-11 VITALS — WEIGHT: 198 LBS | HEIGHT: 64 IN | BODY MASS INDEX: 33.8 KG/M2

## 2024-09-11 DIAGNOSIS — Z12.11 COLON CANCER SCREENING: Primary | ICD-10-CM

## 2024-09-11 PROCEDURE — 99212 OFFICE O/P EST SF 10 MIN: CPT | Performed by: SURGERY

## 2024-09-11 NOTE — PROGRESS NOTES
Subjective   Jennifer Horne is a 64 y.o. female  is here today for follow-up.         Jennifer Horne is a 64 y.o. female doing well after screening colonoscopy.  Final pathology of polyps removed consistent with tubular adenoma.  No atypia or malignancy identified.  History of Present Illness         Physical Exam  Abdomen soft nontender nondistended  Physical Exam              Assessment     Diagnoses and all orders for this visit:    1. Colon cancer screening (Primary)      Jennifer Horne is a 64 y.o. female doing well after screening colonoscopy.  Multiple tubular adenoma removed without atypia or malignancy.  Repeat colonoscopy in 3 years.  Assessment & Plan          This document has been electronically signed by Samir Drake MD   September 11, 2024 15:44 EDT

## 2024-10-21 ENCOUNTER — OFFICE VISIT (OUTPATIENT)
Dept: FAMILY MEDICINE CLINIC | Facility: CLINIC | Age: 64
End: 2024-10-21
Payer: COMMERCIAL

## 2024-10-21 VITALS
HEART RATE: 53 BPM | BODY MASS INDEX: 32.44 KG/M2 | OXYGEN SATURATION: 97 % | SYSTOLIC BLOOD PRESSURE: 130 MMHG | WEIGHT: 190 LBS | TEMPERATURE: 97.9 F | DIASTOLIC BLOOD PRESSURE: 80 MMHG | HEIGHT: 64 IN

## 2024-10-21 DIAGNOSIS — J30.89 CHRONIC NONSEASONAL ALLERGIC RHINITIS DUE TO FUNGAL SPORES: ICD-10-CM

## 2024-10-21 DIAGNOSIS — M81.0 SENILE OSTEOPOROSIS: Chronic | ICD-10-CM

## 2024-10-21 DIAGNOSIS — B02.23 POST-HERPETIC POLYNEUROPATHY: Primary | Chronic | ICD-10-CM

## 2024-10-21 DIAGNOSIS — E66.01 CLASS 2 SEVERE OBESITY DUE TO EXCESS CALORIES WITH SERIOUS COMORBIDITY AND BODY MASS INDEX (BMI) OF 35.0 TO 35.9 IN ADULT: ICD-10-CM

## 2024-10-21 DIAGNOSIS — M62.838 MUSCLE SPASM OF BOTH LOWER LEGS: Chronic | ICD-10-CM

## 2024-10-21 DIAGNOSIS — R09.81 NASAL CONGESTION: ICD-10-CM

## 2024-10-21 DIAGNOSIS — E66.812 CLASS 2 SEVERE OBESITY DUE TO EXCESS CALORIES WITH SERIOUS COMORBIDITY AND BODY MASS INDEX (BMI) OF 35.0 TO 35.9 IN ADULT: ICD-10-CM

## 2024-10-21 DIAGNOSIS — Z51.81 ENCOUNTER FOR THERAPEUTIC DRUG MONITORING: ICD-10-CM

## 2024-10-21 DIAGNOSIS — Z79.899 ENCOUNTER FOR LONG-TERM (CURRENT) USE OF MEDICATIONS: ICD-10-CM

## 2024-10-21 PROCEDURE — 99214 OFFICE O/P EST MOD 30 MIN: CPT | Performed by: PHYSICIAN ASSISTANT

## 2024-10-21 RX ORDER — GABAPENTIN 300 MG/1
300 CAPSULE ORAL 3 TIMES DAILY
Qty: 90 CAPSULE | Refills: 1 | Status: SHIPPED | OUTPATIENT
Start: 2024-10-21

## 2024-10-21 RX ORDER — IBANDRONATE SODIUM 150 MG/1
150 TABLET, FILM COATED ORAL
Qty: 1 TABLET | Refills: 5 | Status: SHIPPED | OUTPATIENT
Start: 2024-10-21

## 2024-10-21 RX ORDER — PSEUDOEPHEDRINE HCL 120 MG/1
120 TABLET, FILM COATED, EXTENDED RELEASE ORAL EVERY 12 HOURS
Qty: 30 TABLET | Refills: 0 | Status: SHIPPED | OUTPATIENT
Start: 2024-10-21

## 2024-10-21 RX ORDER — FLUTICASONE PROPIONATE 50 UG/1
SPRAY, METERED NASAL
Qty: 18.2 ML | Refills: 5 | Status: SHIPPED | OUTPATIENT
Start: 2024-10-21

## 2024-10-21 NOTE — PROGRESS NOTES
"Chief Complaint -postherpetic neuralgia    History of Present Illness -     Jennifer Horne is a 64 y.o. female.     Postherpetic neuralgia-  Stable with gabapentin    Allergic rhinitis-  Stable with Flonase    Osteoporosis-  Stable with Boniva.  She does have some heartburn which is controlled with the use of Pepcid after taking Boniva.    Nasal congestion-  Stable with Sudafed as needed    Obesity-  Stable after bariatric surgery and dietary modification    Muscle spasms-  Stable with tizanidine      The following portions of the patient's history were reviewed and updated as appropriate: allergies, current medications, past family history, past medical history, past social history, past surgical history and problem list.        Objective  Vital signs:  /80   Pulse 53   Temp 97.9 °F (36.6 °C) (Temporal)   Ht 162.6 cm (64\")   Wt 86.2 kg (190 lb)   SpO2 97%   BMI 32.61 kg/m²     Physical Exam  Vitals and nursing note reviewed.   Constitutional:       Appearance: Normal appearance. She is well-developed.   Eyes:      Extraocular Movements: Extraocular movements intact.      Conjunctiva/sclera: Conjunctivae normal.   Cardiovascular:      Rate and Rhythm: Regular rhythm. Bradycardia present.      Heart sounds: Normal heart sounds. No murmur heard.  Pulmonary:      Effort: Pulmonary effort is normal. No respiratory distress.      Breath sounds: Normal breath sounds. No wheezing.   Musculoskeletal:         General: No tenderness.   Skin:     General: Skin is warm and dry.      Findings: No rash.   Neurological:      Mental Status: She is alert and oriented to person, place, and time.   Psychiatric:         Mood and Affect: Mood normal.         Behavior: Behavior normal.         Thought Content: Thought content normal.         The following data was reviewed by Aisha Coffman PA-C:         Lab Results   Component Value Date    BUN 12 05/13/2024    CREATININE 0.81 05/13/2024    EGFR 82.2 05/13/2024    ALT 16 " 05/13/2024    AST 19 05/13/2024    WBC 5.27 06/14/2022    HGB 13.5 06/14/2022    HCT 42.0 06/14/2022     06/14/2022    CHOL 151 05/13/2024    TRIG 90 05/13/2024    HDL 52 05/13/2024    LDL 82 05/13/2024    TSH 1.200 06/14/2022    HGBA1C 5.60 05/13/2024           Assessment & Plan     Diagnoses and all orders for this visit:    1. Post-herpetic polyneuropathy (Primary)  Comments:  Continue gabapentin  Orders:  -     gabapentin (NEURONTIN) 300 MG capsule; Take 1 capsule by mouth 3 (Three) Times a Day.  Dispense: 90 capsule; Refill: 1    2. Encounter for therapeutic drug monitoring  -     Urine Drug Screen - Urine, Clean Catch; Future    3. Encounter for long-term (current) use of medications  -     Urine Drug Screen - Urine, Clean Catch; Future    4. Chronic nonseasonal allergic rhinitis due to fungal spores  Comments:  Continue Flonase  Advised to avoid allergen triggers when possible  Orders:  -     fluticasone (FLONASE) 50 MCG/ACT nasal spray; SPRAY TWO SPRAYS IN EACH NOSTRIL ONCE DAILY  Dispense: 18.2 mL; Refill: 5    5. Senile osteoporosis  Comments:  Continue Boniva  Fall risk precautions advised  Orders:  -     ibandronate (Boniva) 150 MG tablet; Take 1 tablet by mouth Every 30 (Thirty) Days.  Dispense: 1 tablet; Refill: 5    6. Nasal congestion  Comments:  Continue Sudafed as needed  Advised to avoid known environmental allergens when possible  Orders:  -     pseudoephedrine (SUDAFED) 120 MG 12 hr tablet; Take 1 tablet by mouth Every 12 (Twelve) Hours.  Dispense: 30 tablet; Refill: 0    7. Class 2 severe obesity due to excess calories with serious comorbidity and body mass index (BMI) of 35.0 to 35.9 in adult  -     Tirzepatide-Weight Management (ZEPBOUND) 10 MG/0.5ML solution auto-injector; Inject 0.5 mL under the skin into the appropriate area as directed 1 (One) Time Per Week.  Dispense: 2 mL; Refill: 3    8. Muscle spasm of both lower legs  Comments:  Continue tizanidine  Orders:  -     tiZANidine  (ZANAFLEX) 4 MG tablet; Take 1 tablet by mouth At Night As Needed for Muscle Spasms.  Dispense: 90 tablet; Refill: 3                   Patient was given instructions and counseling regarding his condition or for health maintenance advice. Please see specific information pulled into the AVS if appropriate      This document has been electronically signed by:  Aisha Coffman PA-C

## 2024-10-28 ENCOUNTER — TRANSCRIBE ORDERS (OUTPATIENT)
Dept: ADMINISTRATIVE | Facility: HOSPITAL | Age: 64
End: 2024-10-28
Payer: COMMERCIAL

## 2024-10-28 DIAGNOSIS — Z12.31 VISIT FOR SCREENING MAMMOGRAM: Primary | ICD-10-CM

## 2024-12-15 DIAGNOSIS — E66.01 CLASS 2 SEVERE OBESITY DUE TO EXCESS CALORIES WITH SERIOUS COMORBIDITY AND BODY MASS INDEX (BMI) OF 35.0 TO 35.9 IN ADULT: ICD-10-CM

## 2024-12-15 DIAGNOSIS — E66.812 CLASS 2 SEVERE OBESITY DUE TO EXCESS CALORIES WITH SERIOUS COMORBIDITY AND BODY MASS INDEX (BMI) OF 35.0 TO 35.9 IN ADULT: ICD-10-CM

## 2024-12-16 RX ORDER — TIRZEPATIDE 10 MG/.5ML
INJECTION, SOLUTION SUBCUTANEOUS
Qty: 2 ML | Refills: 3 | Status: SHIPPED | OUTPATIENT
Start: 2024-12-16

## 2024-12-19 ENCOUNTER — HOSPITAL ENCOUNTER (OUTPATIENT)
Dept: MAMMOGRAPHY | Facility: HOSPITAL | Age: 64
Discharge: HOME | End: 2024-12-19
Admitting: PHYSICIAN ASSISTANT
Payer: COMMERCIAL

## 2024-12-19 DIAGNOSIS — Z12.31 VISIT FOR SCREENING MAMMOGRAM: ICD-10-CM

## 2024-12-19 PROCEDURE — 77067 SCR MAMMO BI INCL CAD: CPT

## 2024-12-19 PROCEDURE — 77063 BREAST TOMOSYNTHESIS BI: CPT

## 2025-03-03 RX ORDER — OSELTAMIVIR PHOSPHATE 75 MG/1
75 CAPSULE ORAL 2 TIMES DAILY
Qty: 10 CAPSULE | Refills: 0 | Status: SHIPPED | OUTPATIENT
Start: 2025-03-03

## 2025-03-15 DIAGNOSIS — K21.9 GERD WITHOUT ESOPHAGITIS: Chronic | ICD-10-CM

## 2025-03-17 ENCOUNTER — PRIOR AUTHORIZATION (OUTPATIENT)
Dept: FAMILY MEDICINE CLINIC | Facility: CLINIC | Age: 65
End: 2025-03-17
Payer: COMMERCIAL

## 2025-03-18 RX ORDER — PANTOPRAZOLE SODIUM 40 MG/1
40 TABLET, DELAYED RELEASE ORAL DAILY
Qty: 90 TABLET | Refills: 3 | Status: SHIPPED | OUTPATIENT
Start: 2025-03-18

## 2025-04-06 DIAGNOSIS — E66.01 CLASS 2 SEVERE OBESITY DUE TO EXCESS CALORIES WITH SERIOUS COMORBIDITY AND BODY MASS INDEX (BMI) OF 35.0 TO 35.9 IN ADULT: ICD-10-CM

## 2025-04-06 DIAGNOSIS — E66.812 CLASS 2 SEVERE OBESITY DUE TO EXCESS CALORIES WITH SERIOUS COMORBIDITY AND BODY MASS INDEX (BMI) OF 35.0 TO 35.9 IN ADULT: ICD-10-CM

## 2025-04-07 RX ORDER — TIRZEPATIDE 10 MG/.5ML
INJECTION, SOLUTION SUBCUTANEOUS
Qty: 2 ML | Refills: 3 | Status: SHIPPED | OUTPATIENT
Start: 2025-04-07

## 2025-04-21 ENCOUNTER — OFFICE VISIT (OUTPATIENT)
Dept: FAMILY MEDICINE CLINIC | Facility: CLINIC | Age: 65
End: 2025-04-21
Payer: COMMERCIAL

## 2025-04-21 VITALS
WEIGHT: 180.6 LBS | OXYGEN SATURATION: 97 % | DIASTOLIC BLOOD PRESSURE: 76 MMHG | HEIGHT: 64 IN | BODY MASS INDEX: 30.83 KG/M2 | TEMPERATURE: 98 F | SYSTOLIC BLOOD PRESSURE: 132 MMHG | HEART RATE: 61 BPM

## 2025-04-21 DIAGNOSIS — E66.811 OBESITY (BMI 30.0-34.9): Chronic | ICD-10-CM

## 2025-04-21 DIAGNOSIS — M79.671 RIGHT FOOT PAIN: Chronic | ICD-10-CM

## 2025-04-21 DIAGNOSIS — F43.21 GRIEF: Primary | Chronic | ICD-10-CM

## 2025-04-21 DIAGNOSIS — K21.9 GERD WITHOUT ESOPHAGITIS: Chronic | ICD-10-CM

## 2025-04-21 PROBLEM — R55 SYNCOPE AND COLLAPSE: Status: RESOLVED | Noted: 2022-03-08 | Resolved: 2025-04-21

## 2025-04-21 PROCEDURE — 99214 OFFICE O/P EST MOD 30 MIN: CPT | Performed by: PHYSICIAN ASSISTANT

## 2025-04-21 RX ORDER — ESCITALOPRAM OXALATE 10 MG/1
10 TABLET ORAL DAILY
Qty: 30 TABLET | Refills: 5 | Status: SHIPPED | OUTPATIENT
Start: 2025-04-21

## 2025-04-21 NOTE — PROGRESS NOTES
"Chief Complaint -grief    History of Present Illness -     Jennifer Horne is a 65 y.o. female.     Grief-  Patient complains of grief after the loss of her  to leukemia last week.  They were  for 40 years.  He had a long flight with leukemia and passed away in the hospital.  She is sad and in the early stages of grieving.  She complains of being overwhelmed and lost.  She is having trouble going back to sleep after she wakes up and sees that he is not in the bed.  She reports that her mind and thoughts prevent her from going back to sleep.  She denies any hallucinations, SI or HI    Obesity-  Not at goal.  She had been out of her Zepbound for over a week.  She started back on the 10 mg dose and it made her very nauseous.  We discussed that she may need to restart at the lower dose of 2.5.    Right foot pain-  Patient complains of pain in her right foot and ankle.  She does have a history of fracturing the ankle in the past.  No known specific new injury is reported.  You    Gastroesophageal reflux disease-  Stable with famotidine, pantoprazole, and dietary modification      The following portions of the patient's history were reviewed and updated as appropriate: allergies, current medications, past family history, past medical history, past social history, past surgical history and problem list.        Objective  Vital signs:  /76 (BP Location: Right arm, Patient Position: Sitting, Cuff Size: Adult)   Pulse 61   Temp 98 °F (36.7 °C) (Temporal)   Ht 162.6 cm (64\")   Wt 81.9 kg (180 lb 9.6 oz)   SpO2 97%   BMI 31.00 kg/m²     Physical Exam  Vitals and nursing note reviewed.   Constitutional:       Appearance: Normal appearance. She is well-developed.   Eyes:      Extraocular Movements: Extraocular movements intact.      Conjunctiva/sclera: Conjunctivae normal.   Cardiovascular:      Rate and Rhythm: Normal rate and regular rhythm.      Heart sounds: Normal heart sounds. No murmur " heard.  Pulmonary:      Effort: Pulmonary effort is normal. No respiratory distress.      Breath sounds: Normal breath sounds. No wheezing.   Musculoskeletal:         General: No tenderness.   Skin:     General: Skin is warm and dry.      Findings: No rash.   Neurological:      Mental Status: She is alert and oriented to person, place, and time.   Psychiatric:         Behavior: Behavior normal.         Thought Content: Thought content normal.      Comments: Mood is somber         The following data was reviewed by Aisha Coffman PA-C:         Lab Results   Component Value Date    BUN 12 05/13/2024    CREATININE 0.81 05/13/2024    EGFR 82.2 05/13/2024    ALT 16 05/13/2024    AST 19 05/13/2024    WBC 5.27 06/14/2022    HGB 13.5 06/14/2022    HCT 42.0 06/14/2022     06/14/2022    CHOL 151 05/13/2024    TRIG 90 05/13/2024    HDL 52 05/13/2024    LDL 82 05/13/2024    TSH 1.200 06/14/2022    HGBA1C 5.60 05/13/2024           Assessment & Plan     Diagnoses and all orders for this visit:    1. Grief (Primary)  Comments:  Start Lexapro as this should help to calm down her mind and help with anxiety related to grief so that she can function and sleep  Orders:  -     escitalopram (Lexapro) 10 MG tablet; Take 1 tablet by mouth Daily.  Dispense: 30 tablet; Refill: 5    2. Obesity (BMI 30.0-34.9)  Comments:  Decreasing Zepbound dose back down to 2.5 since she has not been on Zepbound for several weeks  Orders:  -     Tirzepatide-Weight Management (ZEPBOUND) 2.5 MG/0.5ML solution auto-injector; Inject 0.5 mL under the skin into the appropriate area as directed 1 (One) Time Per Week.  Dispense: 2 mL; Refill: 5    3. Right foot pain  Comments:  Refer to podiatry for further evaluation  Advised rest ice compress and elevate  Orders:  -     Cancel: Ambulatory Referral to Podiatry  -     Ambulatory Referral to Podiatry    4. GERD without esophagitis  Comments:  Continue pantoprazole and famotidine  Advised to avoid known trigger  foods                   Patient was given instructions and counseling regarding his condition or for health maintenance advice. Please see specific information pulled into the AVS if appropriate      This document has been electronically signed by:  Aisha Coffman PA-C

## 2025-04-21 NOTE — PATIENT INSTRUCTIONS
Carbohydrate Counting for Diabetes Mellitus, Adult  Carbohydrate counting is a method of keeping track of how many carbohydrates you eat. Eating carbohydrates increases the amount of sugar (glucose) in the blood. Counting how many carbohydrates you eat improves how well you manage your blood glucose. This, in turn, helps you manage your diabetes.  Carbohydrates are measured in grams (g) per serving. It is important to know how many carbohydrates (in grams or by serving size) you can have in each meal. This is different for every person. A dietitian can help you make a meal plan and calculate how many carbohydrates you should have at each meal and snack.  What foods contain carbohydrates?  Carbohydrates are found in the following foods:  Grains, such as breads and cereals.  Dried beans and soy products.  Starchy vegetables, such as potatoes, peas, and corn.  Fruit and fruit juices.  Milk and yogurt.  Sweets and snack foods, such as cake, cookies, candy, chips, and soft drinks.  How do I count carbohydrates in foods?  There are two ways to count carbohydrates in food. You can read food labels or learn standard serving sizes of foods. You can use either of these methods or a combination of both.  Using the Nutrition Facts label  The Nutrition Facts list is included on the labels of almost all packaged foods and beverages in the [place]. It includes:  The serving size.  Information about nutrients in each serving, including the grams of carbohydrate per serving.  To use the Nutrition Facts, decide how many servings you will have. Then, multiply the number of servings by the number of carbohydrates per serving. The resulting number is the total grams of carbohydrates that you will be having.  Learning the standard serving sizes of foods  When you eat carbohydrate foods that are not packaged or do not include Nutrition Facts on the label, you need to measure the servings in order to count the grams of  carbohydrates.  Measure the foods that you will eat with a food scale or measuring cup, if needed.  Decide how many standard-size servings you will eat.  Multiply the number of servings by 15. For foods that contain carbohydrates, one serving equals 15 g of carbohydrates.  For example, if you eat 2 cups or 10 oz (300 g) of strawberries, you will have eaten 2 servings and 30 g of carbohydrates (2 servings x 15 g = 30 g).  For foods that have more than one food mixed, such as soups and casseroles, you must count the carbohydrates in each food that is included.  The following list contains standard serving sizes of common carbohydrate-rich foods. Each of these servings has about 15 g of carbohydrates:  1 slice of bread.  1 six-inch (15 cm) tortilla.  ? cup or 2 oz (53 g) cooked rice or pasta.  ½ cup or 3 oz (85 g) cooked or canned, drained and rinsed beans or lentils.  ½ cup or 3 oz (85 g) starchy vegetable, such as peas, corn, or squash.  ½ cup or 4 oz (120 g) hot cereal.  ½ cup or 3 oz (85 g) boiled or mashed potatoes, or ¼ or 3 oz (85 g) of a large baked potato.  ½ cup or 4 fl oz (118 mL) fruit juice.  1 cup or 8 fl oz (237 mL) milk.  1 small or 4 oz (106 g) apple.  ½ or 2 oz (63 g) of a medium banana.  1 cup or 5 oz (150 g) strawberries.  3 cups or 1 oz (28.3 g) popped popcorn.  What is an example of carbohydrate counting?  To calculate the grams of carbohydrates in this sample meal, follow the steps shown below.  Sample meal  3 oz (85 g) chicken breast.  ? cup or 4 oz (106 g) brown rice.  ½ cup or 3 oz (85 g) corn.  1 cup or 8 fl oz (237 mL) milk.  1 cup or 5 oz (150 g) strawberries with sugar-free whipped topping.  Carbohydrate calculation  Identify the foods that contain carbohydrates:  Rice.  Corn.  Milk.  Strawberries.  Calculate how many servings you have of each food:  2 servings rice.  1 serving corn.  1 serving milk.  1 serving strawberries.  Multiply each number of servings by 15  servings rice x 15  g = 30 g.  1 serving corn x 15 g = 15 g.  1 serving milk x 15 g = 15 g.  1 serving strawberries x 15 g = 15 g.  Add together all of the amounts to find the total grams of carbohydrates eaten:  30 g + 15 g + 15 g + 15 g = 75 g of carbohydrates total.  What are tips for following this plan?  Shopping  Develop a meal plan and then make a shopping list.  Buy fresh and frozen vegetables, fresh and frozen fruit, dairy, eggs, beans, lentils, and whole grains.  Look at food labels. Choose foods that have more fiber and less sugar.  Avoid processed foods and foods with added sugars.  Meal planning  Aim to have the same number of grams of carbohydrates at each meal and for each snack time.  Plan to have regular, balanced meals and snacks.  Where to find more information  American Diabetes Association: diabetes.org  Centers for Disease Control and Prevention: cdc.gov  Academy of Nutrition and Dietetics: eatright.org  Association of Diabetes Care & Education Specialists: diabeteseducator.org  Summary  Carbohydrate counting is a method of keeping track of how many carbohydrates you eat.  Eating carbohydrates increases the amount of sugar (glucose) in your blood.  Counting how many carbohydrates you eat improves how well you manage your blood glucose. This helps you manage your diabetes.  A dietitian can help you make a meal plan and calculate how many carbohydrates you should have at each meal and snack.  This information is not intended to replace advice given to you by your health care provider. Make sure you discuss any questions you have with your health care provider.  Document Revised: 07/20/2021 Document Reviewed: 07/21/2021  Elsevier Patient Education © 2024 [employer]

## 2025-05-11 DIAGNOSIS — M81.0 SENILE OSTEOPOROSIS: Chronic | ICD-10-CM

## 2025-05-12 RX ORDER — IBANDRONATE SODIUM 150 MG/1
150 TABLET, FILM COATED ORAL
Qty: 3 TABLET | Refills: 3 | Status: SHIPPED | OUTPATIENT
Start: 2025-05-12

## 2025-05-19 ENCOUNTER — OFFICE VISIT (OUTPATIENT)
Dept: FAMILY MEDICINE CLINIC | Facility: CLINIC | Age: 65
End: 2025-05-19
Payer: COMMERCIAL

## 2025-05-19 VITALS
OXYGEN SATURATION: 98 % | SYSTOLIC BLOOD PRESSURE: 130 MMHG | BODY MASS INDEX: 30.73 KG/M2 | HEIGHT: 64 IN | DIASTOLIC BLOOD PRESSURE: 78 MMHG | TEMPERATURE: 98 F | WEIGHT: 180 LBS | HEART RATE: 83 BPM

## 2025-05-19 DIAGNOSIS — E55.9 VITAMIN D DEFICIENCY: Chronic | ICD-10-CM

## 2025-05-19 DIAGNOSIS — B02.23 POST-HERPETIC POLYNEUROPATHY: ICD-10-CM

## 2025-05-19 DIAGNOSIS — E66.812 CLASS 2 SEVERE OBESITY DUE TO EXCESS CALORIES WITH SERIOUS COMORBIDITY AND BODY MASS INDEX (BMI) OF 35.0 TO 35.9 IN ADULT: Chronic | ICD-10-CM

## 2025-05-19 DIAGNOSIS — F43.21 GRIEF: Chronic | ICD-10-CM

## 2025-05-19 DIAGNOSIS — E66.01 CLASS 2 SEVERE OBESITY DUE TO EXCESS CALORIES WITH SERIOUS COMORBIDITY AND BODY MASS INDEX (BMI) OF 35.0 TO 35.9 IN ADULT: Chronic | ICD-10-CM

## 2025-05-19 DIAGNOSIS — Z51.81 ENCOUNTER FOR THERAPEUTIC DRUG LEVEL MONITORING: Primary | ICD-10-CM

## 2025-05-19 DIAGNOSIS — K21.9 GERD WITHOUT ESOPHAGITIS: Chronic | ICD-10-CM

## 2025-05-19 DIAGNOSIS — Z79.899 ENCOUNTER FOR LONG-TERM (CURRENT) USE OF HIGH-RISK MEDICATION: ICD-10-CM

## 2025-05-19 RX ORDER — SEMAGLUTIDE 0.5 MG/.5ML
0.5 INJECTION, SOLUTION SUBCUTANEOUS
Qty: 2 ML | Refills: 2 | Status: SHIPPED | OUTPATIENT
Start: 2025-05-19

## 2025-05-20 NOTE — PATIENT INSTRUCTIONS
"Fat and Cholesterol Restricted Eating Plan  Getting too much fat and cholesterol in your diet may cause health problems. Choosing the right foods helps keep your fat and cholesterol at normal levels. This can keep you from getting certain diseases.  Your doctor may recommend an eating plan that includes:  Total fat: ______% or less of total calories a day. This is ______g of fat a day.  Saturated fat: ______% or less of total calories a day. This is ______g of saturated fat a day.  Cholesterol: less than _________mg a day.  Fiber: ______g a day.  What are tips for following this plan?  General tips  Work with your doctor to lose weight if you need to.  Avoid:  Foods with added sugar.  Fried foods.  Foods with trans fat or partially hydrogenated oils. This includes some margarines and baked goods.  If you drink alcohol:  Limit how much you have to:  0-1 drink a day for women who are not pregnant.  0-2 drinks a day for men.  Know how much alcohol is in a drink. In the [place] one drink equals one 12 oz bottle of beer (355 mL), one 5 oz glass of wine (148 mL), or one 1½ oz glass of hard liquor (44 mL).  Reading food labels  Check food labels for:  Trans fats.  Partially hydrogenated oils.  Saturated fat (g) in each serving.  Cholesterol (mg) in each serving.  Fiber (g) in each serving.  Choose foods with healthy fats, such as:  Monounsaturated fats and polyunsaturated fats. These include olive and canola oil, flaxseeds, walnuts, almonds, and seeds.  Omega-3 fats. These are found in certain fish, flaxseed oil, and ground flaxseeds.  Choose grain products that have whole grains. Look for the word \"whole\" as the first word in the ingredient list.  Cooking  Cook foods using low-fat methods. These include baking, boiling, grilling, and broiling.  Eat more home-cooked foods. Eat at restaurants and buffets less often. Eat less fast food.  Avoid cooking using saturated fats, such as butter, cream, palm oil, palm kernel oil, and " coconut oil.  Meal planning    At meals, divide your plate into four equal parts:  Fill one-half of your plate with vegetables, green salads, and fruit.  Fill one-fourth of your plate with whole grains.  Fill one-fourth of your plate with low-fat (lean) protein foods.  Eat fish that is high in omega-3 fats at least two times a week. This includes mackerel, tuna, sardines, and salmon.  Eat foods that are high in fiber, such as whole grains, beans, apples, pears, berries, broccoli, carrots, peas, and barley.  What foods should I eat?  Fruits  All fresh, canned (in natural juice), or frozen fruits.  Vegetables  Fresh or frozen vegetables (raw, steamed, roasted, or grilled). Green salads.  Grains  Whole grains, such as whole wheat or whole grain breads, crackers, cereals, and pasta. Unsweetened oatmeal, bulgur, barley, quinoa, or brown rice. Corn or whole wheat flour tortillas.  Meats and other protein foods  Ground beef (85% or leaner), grass-fed beef, or beef trimmed of fat. Skinless chicken or turkey. Ground chicken or turkey. Pork trimmed of fat. All fish and seafood. Egg whites. Dried beans, peas, or lentils. Unsalted nuts or seeds. Unsalted canned beans. Nut butters without added sugar or oil.  Dairy  Low-fat or nonfat dairy products, such as skim or 1% milk, 2% or reduced-fat cheeses, low-fat and fat-free ricotta or cottage cheese, or plain low-fat and nonfat yogurt.  Fats and oils  Tub margarine without trans fats. Light or reduced-fat mayonnaise and salad dressings. Avocado. Olive, canola, sesame, or safflower oils.  The items listed above may not be a complete list of foods and beverages you can eat. Contact a dietitian for more information.  What foods should I avoid?  Fruits  Canned fruit in heavy syrup. Fruit in cream or butter sauce. Fried fruit.  Vegetables  Vegetables cooked in cheese, cream, or butter sauce. Fried vegetables.  Grains  White bread. White pasta. White rice. Cornbread. Bagels, pastries,  and croissants. Crackers and snack foods that contain trans fat and hydrogenated oils.  Meats and other protein foods  Fatty cuts of meat. Ribs, chicken wings, kennedy, sausage, bologna, salami, chitterlings, fatback, hot dogs, bratwurst, and packaged lunch meats. Liver and organ meats. Whole eggs and egg yolks. Chicken and turkey with skin. Fried meat.  Dairy  Whole or 2% milk, cream, half-and-half, and cream cheese. Whole milk cheeses. Whole-fat or sweetened yogurt. Full-fat cheeses. Nondairy creamers and whipped toppings. Processed cheese, cheese spreads, and cheese curds.  Fats and oils  Butter, stick margarine, lard, shortening, ghee, or kennedy fat. Coconut, palm kernel, and palm oils.  Beverages  Alcohol. Sugar-sweetened drinks such as sodas, lemonade, and fruit drinks.  Sweets and desserts  Corn syrup, sugars, honey, and molasses. Candy. Jam and jelly. Syrup. Sweetened cereals. Cookies, pies, cakes, donuts, muffins, and ice cream.  The items listed above may not be a complete list of foods and beverages you should avoid. Contact a dietitian for more information.  Summary  Choosing the right foods helps keep your fat and cholesterol at normal levels. This can keep you from getting certain diseases.  At meals, fill one-half of your plate with vegetables, green salads, and fruits.  Eat high fiber foods, like whole grains, beans, apples, pears, berries, carrots, peas, and barley.  Limit added sugar, saturated fats, alcohol, and fried foods.  This information is not intended to replace advice given to you by your health care provider. Make sure you discuss any questions you have with your health care provider.  Document Revised: 04/29/2022 Document Reviewed: 04/29/2022  Elsevier Patient Education © 2024 [employer] Inc.

## 2025-05-20 NOTE — PROGRESS NOTES
"Chief Complaint -obesity    History of Present Illness -     Jennifer Horne is a 65 y.o. female.     Obesity-  She has done well with a medically managed weight loss program.  She has been doing well with Zepbound but due to [employer] coverage the formulary has changed.  I reviewed the letter with her today and will make the change so that her medical treatment does not get interrupted    Grief-  She continues to grieve for her the loss of her .  Their 40th anniversary would have been this past month as well as his birthday.  She has done well with going through her stages of grief.  She states that she did fill the escitalopram but did not take the medication as she was trying to deal with this without medication at this time.    Vitamin D deficiency-  Stable with vitamin D supplementation    Gastroesophageal reflux disease-  Stable with pantoprazole      The following portions of the patient's history were reviewed and updated as appropriate: allergies, current medications, past family history, past medical history, past social history, past surgical history and problem list.        Objective  Vital signs:  /78 (BP Location: Right arm, Patient Position: Sitting, Cuff Size: Adult)   Pulse 83   Temp 98 °F (36.7 °C) (Temporal)   Ht 162.6 cm (64\")   Wt 81.6 kg (180 lb)   SpO2 98%   BMI 30.90 kg/m²     Physical Exam  Vitals and nursing note reviewed.   Constitutional:       Appearance: Normal appearance. She is well-developed. She is obese.   Eyes:      Extraocular Movements: Extraocular movements intact.      Conjunctiva/sclera: Conjunctivae normal.   Cardiovascular:      Rate and Rhythm: Normal rate and regular rhythm.      Heart sounds: Normal heart sounds. No murmur heard.  Pulmonary:      Effort: Pulmonary effort is normal. No respiratory distress.      Breath sounds: Normal breath sounds. No wheezing.   Musculoskeletal:         General: No tenderness.   Skin:     General: Skin is warm and " dry.      Findings: No rash.   Neurological:      Mental Status: She is alert and oriented to person, place, and time.   Psychiatric:         Mood and Affect: Mood normal.         Behavior: Behavior normal.         Thought Content: Thought content normal.         The following data was reviewed by Aisha Coffman PA-C:         Lab Results   Component Value Date    BUN 12 05/13/2024    CREATININE 0.81 05/13/2024    EGFR 82.2 05/13/2024    ALT 16 05/13/2024    AST 19 05/13/2024    WBC 5.27 06/14/2022    HGB 13.5 06/14/2022    HCT 42.0 06/14/2022     06/14/2022    CHOL 151 05/13/2024    TRIG 90 05/13/2024    HDL 52 05/13/2024    LDL 82 05/13/2024    TSH 1.200 06/14/2022    HGBA1C 5.60 05/13/2024           Assessment & Plan     Diagnoses and all orders for this visit:    1. Class 2 severe obesity due to excess calories with serious comorbidity and body mass index (BMI) of 35.0 to 35.9 in adult (Primary)  Comments:  Discontinue Zepbound due to [employer] coverage change  Start Wegovy  Orders:  -     Semaglutide-Weight Management (Wegovy) 0.5 MG/0.5ML solution auto-injector; Inject 0.5 mL under the skin into the appropriate area as directed Every 7 (Seven) Days.  Dispense: 2 mL; Refill: 2    2. Grief  Comments:  Patient did not start escitalopram so discussed conservative measures for dealing with grief    3. Vitamin D deficiency  Comments:  Continue vitamin D supplementation    4. GERD without esophagitis  Comments:  Continue pantoprazole  Advised to avoid known trigger foods                   Patient was given instructions and counseling regarding his condition or for health maintenance advice. Please see specific information pulled into the AVS if appropriate      This document has been electronically signed by:  Aisha Coffman PA-C

## 2025-05-23 LAB
6-ACETYLMORPHINE: NOT DETECTED NG/ML
6MAM SERPLBLD-MCNC: NOT DETECTED NG/ML
7- AMINOCLONAZEPAM: NOT DETECTED NG/ML
9-DELTA-THC-COOH: NOT DETECTED NG/ML
ALCOHOL, ETHYL: NOT DETECTED MG/DL
ALPHA-HYDROXYALPRAZOLAM: NOT DETECTED NG/ML
ALPRAZ SPEC-MCNC: NOT DETECTED NG/ML
AMITRIP SERPL-MCNC: NOT DETECTED NG/ML
AMPHET SAL QL CFM: NOT DETECTED NG/ML
AMPHETAMINE: NOT DETECTED NG/ML
BENZODIAZ BLD QL: NOT DETECTED NG/ML
BUPRENORPHINE SAL CFM-MCNC: NOT DETECTED NG/ML
BUPRENORPHINE: NOT DETECTED NG/ML
BUTALBITAL: NOT DETECTED NG/ML
COCAINE METABOLITE: NOT DETECTED NG/ML
CREATININE: 85.9 MG/DL
DOXEPIN: NOT DETECTED NG/ML
EDDP SERPL QL: NOT DETECTED NG/ML
EDDP: NOT DETECTED NG/ML
FENTANYL SAL QL SCN: NOT DETECTED NG/ML
FENTANYL-EIA: NOT DETECTED NG/ML
GABAPENTIN: >2500 NG/ML
LORAZEPAM: NOT DETECTED NG/ML
METHADONE: NOT DETECTED NG/ML
METHAMPHET/CREAT UR: NOT DETECTED NG/ML
METHAMPHETAMINE: NOT DETECTED NG/ML
NORBUPRENORPHINE: NOT DETECTED NG/ML
NORDIAZEPAM: NOT DETECTED NG/ML
NORFENTANYL: NOT DETECTED NG/ML
NORTRIPTYLINE: NOT DETECTED NG/ML
OPIATES: NOT DETECTED NG/ML
OXAZEPAM: NOT DETECTED NG/ML
OXIDANTS: NOT DETECTED UG/ML
OXYCODONE: NOT DETECTED NG/ML
PCP SAL CFM-MCNC: NOT DETECTED NG/ML
PCP: NOT DETECTED NG/ML
PH: 6.3 (ref 4.5–9)
PHENOBARBITAL: NOT DETECTED NG/ML
PHENTERMINE: NOT DETECTED NG/ML
REF LAB TEST METHOD: NORMAL
SPECIFIC GRAVITY, QUAN: 1.01 (ref 1–1.03)
TEMAZEPAM: NOT DETECTED NG/ML
THC: NOT DETECTED NG/ML

## 2025-06-26 ENCOUNTER — TELEPHONE (OUTPATIENT)
Dept: FAMILY MEDICINE CLINIC | Facility: CLINIC | Age: 65
End: 2025-06-26
Payer: COMMERCIAL

## 2025-06-26 DIAGNOSIS — Z79.899 ON STATIN THERAPY DUE TO RISK OF FUTURE CARDIOVASCULAR EVENT: Primary | ICD-10-CM

## 2025-06-26 RX ORDER — ATORVASTATIN CALCIUM 10 MG/1
10 TABLET, FILM COATED ORAL DAILY
Qty: 90 TABLET | Refills: 3 | Status: SHIPPED | OUTPATIENT
Start: 2025-06-26

## 2025-06-26 NOTE — TELEPHONE ENCOUNTER
----- Message from Aisha Coffman sent at 6/26/2025  8:30 AM EDT -----  Quality measure-statin therapy    1.  Call the patient and inform her I recommend starting atorvastatin to reduce risk of future cardiovascular event.  Medication has also been shown to improve life expectancy by few years.  2.  Prescription for atorvastatin was sent to her pharmacy

## 2025-07-07 ENCOUNTER — OFFICE VISIT (OUTPATIENT)
Dept: FAMILY MEDICINE CLINIC | Facility: CLINIC | Age: 65
End: 2025-07-07
Payer: COMMERCIAL

## 2025-07-07 VITALS
OXYGEN SATURATION: 96 % | BODY MASS INDEX: 29.88 KG/M2 | WEIGHT: 175 LBS | HEART RATE: 79 BPM | DIASTOLIC BLOOD PRESSURE: 94 MMHG | HEIGHT: 64 IN | SYSTOLIC BLOOD PRESSURE: 144 MMHG | TEMPERATURE: 98 F

## 2025-07-07 DIAGNOSIS — R09.1 PLEURISY: Primary | ICD-10-CM

## 2025-07-07 DIAGNOSIS — R03.0 ELEVATED BLOOD PRESSURE READING WITHOUT DIAGNOSIS OF HYPERTENSION: ICD-10-CM

## 2025-07-07 DIAGNOSIS — R39.9 LOWER URINARY TRACT SYMPTOMS (LUTS): ICD-10-CM

## 2025-07-07 DIAGNOSIS — Z79.899 ON STATIN THERAPY DUE TO RISK OF FUTURE CARDIOVASCULAR EVENT: Chronic | ICD-10-CM

## 2025-07-07 DIAGNOSIS — G47.62 SLEEP RELATED LEG CRAMPS: Chronic | ICD-10-CM

## 2025-07-07 DIAGNOSIS — K21.9 GERD WITHOUT ESOPHAGITIS: Chronic | ICD-10-CM

## 2025-07-07 LAB
BILIRUB BLD-MCNC: NEGATIVE MG/DL
CLARITY, POC: CLEAR
COLOR UR: YELLOW
EXPIRATION DATE: NORMAL
GLUCOSE UR STRIP-MCNC: NEGATIVE MG/DL
KETONES UR QL: NEGATIVE
LEUKOCYTE EST, POC: NEGATIVE
Lab: NORMAL
NITRITE UR-MCNC: NEGATIVE MG/ML
PH UR: 6.5 [PH] (ref 5–8)
PROT UR STRIP-MCNC: NEGATIVE MG/DL
RBC # UR STRIP: NEGATIVE /UL
SP GR UR: 1.01 (ref 1–1.03)
UROBILINOGEN UR QL: NORMAL

## 2025-07-07 PROCEDURE — 93000 ELECTROCARDIOGRAM COMPLETE: CPT | Performed by: PHYSICIAN ASSISTANT

## 2025-07-07 PROCEDURE — 96372 THER/PROPH/DIAG INJ SC/IM: CPT | Performed by: PHYSICIAN ASSISTANT

## 2025-07-07 PROCEDURE — 81003 URINALYSIS AUTO W/O SCOPE: CPT | Performed by: PHYSICIAN ASSISTANT

## 2025-07-07 PROCEDURE — 99214 OFFICE O/P EST MOD 30 MIN: CPT | Performed by: PHYSICIAN ASSISTANT

## 2025-07-07 RX ORDER — METHYLPREDNISOLONE ACETATE 80 MG/ML
80 INJECTION, SUSPENSION INTRA-ARTICULAR; INTRALESIONAL; INTRAMUSCULAR; SOFT TISSUE ONCE
Status: COMPLETED | OUTPATIENT
Start: 2025-07-07 | End: 2025-07-07

## 2025-07-07 RX ORDER — KETOROLAC TROMETHAMINE 30 MG/ML
60 INJECTION, SOLUTION INTRAMUSCULAR; INTRAVENOUS ONCE
Status: COMPLETED | OUTPATIENT
Start: 2025-07-07 | End: 2025-07-07

## 2025-07-07 RX ADMIN — KETOROLAC TROMETHAMINE 60 MG: 30 INJECTION, SOLUTION INTRAMUSCULAR; INTRAVENOUS at 10:30

## 2025-07-07 RX ADMIN — METHYLPREDNISOLONE ACETATE 80 MG: 80 INJECTION, SUSPENSION INTRA-ARTICULAR; INTRALESIONAL; INTRAMUSCULAR; SOFT TISSUE at 10:30

## 2025-07-07 NOTE — PROGRESS NOTES
"Chief Complaint -chest pain    History of Present Illness -     Jennifer Horne is a 65 y.o. female.     Chest pain-  Patient complains of right-sided lateral chest pain at rest and when taking a deep breath.  She is pain is described as sharp and moderate.  Chest pain started at rest yesterday morning and has persisted.  Patient reports chest pain is worse if she coughs, sneezes, moves a certain way or takes a deep breath.    7/7/2025 urinalysis is negative for blood nitrates and leukocytes.  Unremarkable    9/27/2019 cardiac catheterization showed normal coronary vessels    2/3/2022 transthoracic echocardiogram revealed LVEF 60% with trace pulmonic valve regurgitation.  Trace to mild mitral valve regurgitation.    Esophageal reflux disease-  Stable with pantoprazole and famotidine daily and Zofran as needed    Leg cramps/restless leg syndrome-  Stable with gabapentin.    The following portions of the patient's history were reviewed and updated as appropriate: allergies, current medications, past family history, past medical history, past social history, past surgical history and problem list.        Objective  Vital signs:  /94 (BP Location: Left arm, Patient Position: Sitting, Cuff Size: Adult)   Pulse 79   Temp 98 °F (36.7 °C) (Temporal)   Ht 162.6 cm (64\")   Wt 79.4 kg (175 lb)   SpO2 96%   BMI 30.04 kg/m²     Physical Exam  Vitals and nursing note reviewed.   Constitutional:       Appearance: Normal appearance. She is well-developed.   Eyes:      Extraocular Movements: Extraocular movements intact.      Conjunctiva/sclera: Conjunctivae normal.   Cardiovascular:      Rate and Rhythm: Normal rate and regular rhythm.      Heart sounds: Normal heart sounds. No murmur heard.  Pulmonary:      Effort: Pulmonary effort is normal. No respiratory distress.      Breath sounds: Normal breath sounds. No wheezing.   Musculoskeletal:         General: Tenderness present.      Comments: Internist noted to " palpation of right lateral chest musculature.  No breast lumps appreciated.   Skin:     General: Skin is warm and dry.      Findings: No rash.   Neurological:      Mental Status: She is alert and oriented to person, place, and time.   Psychiatric:         Mood and Affect: Mood normal.         Behavior: Behavior normal.         Thought Content: Thought content normal.         The following data was reviewed by Aisha Coffman PA-C:         Lab Results   Component Value Date    BUN 12 05/13/2024    CREATININE 0.81 05/13/2024    EGFR 82.2 05/13/2024    ALT 16 05/13/2024    AST 19 05/13/2024    WBC 5.27 06/14/2022    HGB 13.5 06/14/2022    HCT 42.0 06/14/2022     06/14/2022    CHOL 151 05/13/2024    TRIG 90 05/13/2024    HDL 52 05/13/2024    LDL 82 05/13/2024    TSH 1.200 06/14/2022    HGBA1C 5.60 05/13/2024           Assessment & Plan     Diagnoses and all orders for this visit:    1. Pleurisy (Primary)  Comments:  Chest pain likely due to inflammation/pleurisy  Advised heating pad and conservative measures  RTC or go to ER if chest pain worsens  Orders:  -     methylPREDNISolone acetate (DEPO-medrol) injection 80 mg  -     ketorolac (TORADOL) injection 60 mg  -     ECG 12 Lead    2. Lower urinary tract symptoms (LUTS)  Comments:  Urinalysis reviewed and negative for blood making it unlikely diagnosis of renal calculi  Orders:  -     POC Urinalysis Dipstick, Automated    3. Elevated blood pressure reading without diagnosis of hypertension  Comments:  Likely elevated today due to pain  Plan to treat underlying pain  Continue to monitor    4. GERD without esophagitis  Comments:  Continue pantoprazole daily, famotidine, and Zofran as needed  Advised to avoid known trigger foods    5. On statin therapy due to risk of future cardiovascular event  Comments:  Discussed atorvastatin and advised to continue atorvastatin due to increased risk of future CV event    6. Sleep related leg cramps  Comments:  Continue  gabapentin      7/7/2025 at 9:51 AM  EKG  Performed by: Swati Plaza CMA  Interpreted by: Aisha Coffman PA-C  Indication: Chest pain  Comparison: Compared with EKG 6/14/2022  No significant changes noted  Rhythm: Sinus rhythm  Rate: Normal at 67 bpm  QRS Axis: Normal  Conduction normal  ST segments normal  T waves normal  Clinical impression: Normal EKG/normal sinus rhythm             Patient was given instructions and counseling regarding his condition or for health maintenance advice. Please see specific information pulled into the AVS if appropriate      This document has been electronically signed by:  Aisha Coffman PA-C

## 2025-08-18 ENCOUNTER — OFFICE VISIT (OUTPATIENT)
Dept: FAMILY MEDICINE CLINIC | Facility: CLINIC | Age: 65
End: 2025-08-18
Payer: COMMERCIAL

## 2025-08-18 VITALS
OXYGEN SATURATION: 99 % | WEIGHT: 174.8 LBS | SYSTOLIC BLOOD PRESSURE: 118 MMHG | TEMPERATURE: 98 F | DIASTOLIC BLOOD PRESSURE: 76 MMHG | HEIGHT: 64 IN | HEART RATE: 68 BPM | BODY MASS INDEX: 29.84 KG/M2

## 2025-08-18 DIAGNOSIS — E66.01 CLASS 2 SEVERE OBESITY DUE TO EXCESS CALORIES WITH SERIOUS COMORBIDITY AND BODY MASS INDEX (BMI) OF 35.0 TO 35.9 IN ADULT: Primary | Chronic | ICD-10-CM

## 2025-08-18 DIAGNOSIS — E55.9 VITAMIN D DEFICIENCY: Chronic | ICD-10-CM

## 2025-08-18 DIAGNOSIS — E66.812 CLASS 2 SEVERE OBESITY DUE TO EXCESS CALORIES WITH SERIOUS COMORBIDITY AND BODY MASS INDEX (BMI) OF 35.0 TO 35.9 IN ADULT: Primary | Chronic | ICD-10-CM

## 2025-08-18 DIAGNOSIS — K21.9 GERD WITHOUT ESOPHAGITIS: Chronic | ICD-10-CM

## 2025-08-18 DIAGNOSIS — R11.0 NAUSEA: ICD-10-CM

## 2025-08-18 DIAGNOSIS — B35.4 RINGWORM OF BODY: ICD-10-CM

## 2025-08-18 PROCEDURE — 99214 OFFICE O/P EST MOD 30 MIN: CPT | Performed by: PHYSICIAN ASSISTANT

## 2025-08-18 RX ORDER — SEMAGLUTIDE 1 MG/.5ML
1 INJECTION, SOLUTION SUBCUTANEOUS WEEKLY
Qty: 2 ML | Refills: 5 | Status: SHIPPED | OUTPATIENT
Start: 2025-08-18

## 2025-08-18 RX ORDER — ONDANSETRON 8 MG/1
8 TABLET, ORALLY DISINTEGRATING ORAL EVERY 8 HOURS PRN
Qty: 30 TABLET | Refills: 2 | Status: SHIPPED | OUTPATIENT
Start: 2025-08-18

## 2025-08-18 RX ORDER — KETOCONAZOLE 20 MG/G
1 CREAM TOPICAL DAILY
Qty: 60 G | Refills: 0 | Status: SHIPPED | OUTPATIENT
Start: 2025-08-18

## 2025-08-24 DIAGNOSIS — J30.89 CHRONIC NONSEASONAL ALLERGIC RHINITIS DUE TO POLLEN: Chronic | ICD-10-CM

## 2025-08-25 RX ORDER — LEVOCETIRIZINE DIHYDROCHLORIDE 5 MG/1
5 TABLET, FILM COATED ORAL EVERY MORNING
Qty: 90 TABLET | Refills: 3 | Status: SHIPPED | OUTPATIENT
Start: 2025-08-25

## (undated) DEVICE — GW FC FLOP/TP .035 260CM 3MM

## (undated) DEVICE — CONN Y IRR DISP 1P/U

## (undated) DEVICE — CATH DIAG EXPO .045 FL3.5 5F 100CM

## (undated) DEVICE — CATH DIAG EXPO M/ PK 5F FL4/FR4 PIG

## (undated) DEVICE — MODEL AT P65, P/N 701554-001KIT CONTENTS: HAND CONTROLLER, 3-WAY HIGH-PRESSURE STOPCOCK WITH ROTATING END AND PREMIUM HIGH-PRESSURE TUBING: Brand: ANGIOTOUCH® KIT

## (undated) DEVICE — SNAR POLYP CAPTIFLX MICRO OVL 13MM 240CM

## (undated) DEVICE — Device: Brand: DEFENDO AIR/WATER/SUCTION AND BIOPSY VALVE

## (undated) DEVICE — PK CATH CARD 10

## (undated) DEVICE — DEV COMP RAD PRELUDESYNC 24CM

## (undated) DEVICE — ENDOGATOR TUBING FOR ENDOGATOR EGP-100 IRRIGATION PUMP,OLYMPUS OFP PUMP, OLYMPUS AFU-100 PUMP AND ERBE EIP2 PUMP: Brand: ENDOGATOR

## (undated) DEVICE — Device

## (undated) DEVICE — MODEL BT2000 P/N 700287-012KIT CONTENTS: MANIFOLD WITH SALINE AND CONTRAST PORTS, SALINE TUBING WITH SPIKE AND HAND SYRINGE, TRANSDUCER: Brand: BT2000 AUTOMATED MANIFOLD KIT

## (undated) DEVICE — ENDOGATOR AUXILIARY WATER JET CONNECTOR: Brand: ENDOGATOR

## (undated) DEVICE — ERBE NESSY®PLATE 170 SPLIT; 168CM²; CABLE 3M: Brand: ERBE

## (undated) DEVICE — INTRO SHEATH PRELUDE EASE HC .025 6F 11CM